# Patient Record
Sex: FEMALE | Race: WHITE | NOT HISPANIC OR LATINO | Employment: OTHER | URBAN - METROPOLITAN AREA
[De-identification: names, ages, dates, MRNs, and addresses within clinical notes are randomized per-mention and may not be internally consistent; named-entity substitution may affect disease eponyms.]

---

## 2017-02-02 ENCOUNTER — TELEPHONE (OUTPATIENT)
Dept: FAMILY MEDICINE | Facility: CLINIC | Age: 62
End: 2017-02-02

## 2017-02-02 NOTE — TELEPHONE ENCOUNTER
Panel Management Review      Patient has the following on her problem list:     Diabetes    ASA: Passed    Last A1C  A1C      8.6   9/26/2016  A1C      8.8   4/13/2016  A1C      8.5   10/19/2015  A1C      9.4   7/28/2015  A1C     10.2   4/9/2015  A1C tested: Failed    Last LDL:    CHOL      195   9/26/2016  HDL       47   9/26/2016  LDL      112   9/26/2016  LDL      101   11/9/2013  TRIG      182   9/26/2016  CHOLHDLRATIO      5.6   10/19/2015  NHDL      148   9/26/2016    Is the patient on a Statin? YES             Is the patient on Aspirin? YES    Medications     HMG CoA Reductase Inhibitors    pravastatin (PRAVACHOL) 40 MG tablet    Salicylates    ASPIRIN LOW DOSE 81 MG EC tablet          Last three blood pressure readings:  BP Readings from Last 3 Encounters:   12/16/16 126/82   09/30/16 125/88   04/18/16 130/60       Date of last diabetes office visit: 12/16/2016     Tobacco History:     History   Smoking status     Former Smoker     Quit date: 01/01/1984   Smokeless tobacco     Never Used     Comment: smoke free household.             Composite cancer screening  Chart review shows that this patient is due/due soon for the following None  Summary:    Patient is due/failing the following:   A1C    Action needed:   None    Type of outreach:    none    Questions for provider review:    None                                                                                                                                    Viki Syed M.A.       Chart routed to Viki Syed M.A.   .

## 2017-03-27 ENCOUNTER — DOCUMENTATION ONLY (OUTPATIENT)
Dept: LAB | Facility: CLINIC | Age: 62
End: 2017-03-27

## 2017-03-27 ENCOUNTER — RADIANT APPOINTMENT (OUTPATIENT)
Dept: GENERAL RADIOLOGY | Facility: CLINIC | Age: 62
End: 2017-03-27
Attending: INTERNAL MEDICINE
Payer: COMMERCIAL

## 2017-03-27 ENCOUNTER — OFFICE VISIT (OUTPATIENT)
Dept: RHEUMATOLOGY | Facility: CLINIC | Age: 62
End: 2017-03-27
Payer: COMMERCIAL

## 2017-03-27 VITALS
WEIGHT: 195 LBS | TEMPERATURE: 98.1 F | BODY MASS INDEX: 34.55 KG/M2 | SYSTOLIC BLOOD PRESSURE: 142 MMHG | OXYGEN SATURATION: 100 % | HEIGHT: 63 IN | DIASTOLIC BLOOD PRESSURE: 82 MMHG | HEART RATE: 80 BPM

## 2017-03-27 DIAGNOSIS — M06.9 RHEUMATOID ARTHRITIS INVOLVING MULTIPLE SITES, UNSPECIFIED RHEUMATOID FACTOR PRESENCE: ICD-10-CM

## 2017-03-27 DIAGNOSIS — E78.5 HYPERLIPIDEMIA LDL GOAL <70: Primary | ICD-10-CM

## 2017-03-27 DIAGNOSIS — M06.9 RHEUMATOID ARTHRITIS INVOLVING MULTIPLE SITES, UNSPECIFIED RHEUMATOID FACTOR PRESENCE: Primary | ICD-10-CM

## 2017-03-27 DIAGNOSIS — M54.2 NECK PAIN: ICD-10-CM

## 2017-03-27 DIAGNOSIS — L65.9 ALOPECIA: ICD-10-CM

## 2017-03-27 LAB
ALBUMIN SERPL-MCNC: 3.6 G/DL (ref 3.4–5)
ALP SERPL-CCNC: 68 U/L (ref 40–150)
ALT SERPL W P-5'-P-CCNC: 75 U/L (ref 0–50)
ANION GAP SERPL CALCULATED.3IONS-SCNC: 8 MMOL/L (ref 3–14)
AST SERPL W P-5'-P-CCNC: 33 U/L (ref 0–45)
BASOPHILS # BLD AUTO: 0 10E9/L (ref 0–0.2)
BASOPHILS NFR BLD AUTO: 0.5 %
BILIRUB SERPL-MCNC: 0.4 MG/DL (ref 0.2–1.3)
BUN SERPL-MCNC: 11 MG/DL (ref 7–30)
CALCIUM SERPL-MCNC: 9.2 MG/DL (ref 8.5–10.1)
CHLORIDE SERPL-SCNC: 101 MMOL/L (ref 94–109)
CO2 SERPL-SCNC: 27 MMOL/L (ref 20–32)
CREAT SERPL-MCNC: 0.63 MG/DL (ref 0.52–1.04)
CRP SERPL-MCNC: 10.7 MG/L (ref 0–8)
DIFFERENTIAL METHOD BLD: NORMAL
EOSINOPHIL # BLD AUTO: 0.2 10E9/L (ref 0–0.7)
EOSINOPHIL NFR BLD AUTO: 3.2 %
ERYTHROCYTE [DISTWIDTH] IN BLOOD BY AUTOMATED COUNT: 13.8 % (ref 10–15)
ERYTHROCYTE [SEDIMENTATION RATE] IN BLOOD BY WESTERGREN METHOD: 13 MM/H (ref 0–30)
GFR SERPL CREATININE-BSD FRML MDRD: ABNORMAL ML/MIN/1.7M2
GLUCOSE SERPL-MCNC: 208 MG/DL (ref 70–99)
HCT VFR BLD AUTO: 41.7 % (ref 35–47)
HGB BLD-MCNC: 13.8 G/DL (ref 11.7–15.7)
LYMPHOCYTES # BLD AUTO: 2.1 10E9/L (ref 0.8–5.3)
LYMPHOCYTES NFR BLD AUTO: 35.4 %
MCH RBC QN AUTO: 28.5 PG (ref 26.5–33)
MCHC RBC AUTO-ENTMCNC: 33.1 G/DL (ref 31.5–36.5)
MCV RBC AUTO: 86 FL (ref 78–100)
MONOCYTES # BLD AUTO: 0.3 10E9/L (ref 0–1.3)
MONOCYTES NFR BLD AUTO: 4.7 %
NEUTROPHILS # BLD AUTO: 3.4 10E9/L (ref 1.6–8.3)
NEUTROPHILS NFR BLD AUTO: 56.2 %
PLATELET # BLD AUTO: 196 10E9/L (ref 150–450)
POTASSIUM SERPL-SCNC: 4.3 MMOL/L (ref 3.4–5.3)
PROT SERPL-MCNC: 7.3 G/DL (ref 6.8–8.8)
RBC # BLD AUTO: 4.84 10E12/L (ref 3.8–5.2)
SODIUM SERPL-SCNC: 136 MMOL/L (ref 133–144)
WBC # BLD AUTO: 6 10E9/L (ref 4–11)

## 2017-03-27 PROCEDURE — 85652 RBC SED RATE AUTOMATED: CPT | Performed by: INTERNAL MEDICINE

## 2017-03-27 PROCEDURE — 86039 ANTINUCLEAR ANTIBODIES (ANA): CPT | Mod: 90 | Performed by: INTERNAL MEDICINE

## 2017-03-27 PROCEDURE — 87340 HEPATITIS B SURFACE AG IA: CPT | Performed by: INTERNAL MEDICINE

## 2017-03-27 PROCEDURE — 85025 COMPLETE CBC W/AUTO DIFF WBC: CPT | Performed by: INTERNAL MEDICINE

## 2017-03-27 PROCEDURE — 99204 OFFICE O/P NEW MOD 45 MIN: CPT | Performed by: INTERNAL MEDICINE

## 2017-03-27 PROCEDURE — 86200 CCP ANTIBODY: CPT | Performed by: INTERNAL MEDICINE

## 2017-03-27 PROCEDURE — 71020 XR CHEST 2 VW: CPT

## 2017-03-27 PROCEDURE — 72050 X-RAY EXAM NECK SPINE 4/5VWS: CPT

## 2017-03-27 PROCEDURE — 36415 COLL VENOUS BLD VENIPUNCTURE: CPT | Performed by: INTERNAL MEDICINE

## 2017-03-27 PROCEDURE — 86704 HEP B CORE ANTIBODY TOTAL: CPT | Performed by: INTERNAL MEDICINE

## 2017-03-27 PROCEDURE — 80053 COMPREHEN METABOLIC PANEL: CPT | Performed by: INTERNAL MEDICINE

## 2017-03-27 PROCEDURE — 86140 C-REACTIVE PROTEIN: CPT | Performed by: INTERNAL MEDICINE

## 2017-03-27 PROCEDURE — 86480 TB TEST CELL IMMUN MEASURE: CPT | Performed by: INTERNAL MEDICINE

## 2017-03-27 PROCEDURE — 86431 RHEUMATOID FACTOR QUANT: CPT | Performed by: INTERNAL MEDICINE

## 2017-03-27 PROCEDURE — 73630 X-RAY EXAM OF FOOT: CPT | Mod: 59

## 2017-03-27 PROCEDURE — 73130 X-RAY EXAM OF HAND: CPT | Mod: 59

## 2017-03-27 PROCEDURE — 99000 SPECIMEN HANDLING OFFICE-LAB: CPT | Performed by: INTERNAL MEDICINE

## 2017-03-27 NOTE — PROGRESS NOTES
This patient has a lab only appointment on 3/29/2017 but does not have future orders. Please review, associate diagnosis and sign pending lab orders for the upcoming appointment.  She has an appointment with Dr. Colvin on 4/10/2017.    Thank you,    [facility]
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
## 2017-03-27 NOTE — PROGRESS NOTES
Please review lab orders sign and close encounter. Kaylin Ashby MA/ROB    Diabetes, BP and cholesterol appt 4/10/17

## 2017-03-27 NOTE — Clinical Note
Dr. Colvin,  I plan to start Humira soon for her RA. Checking labs/x-rays first.   Thanks! Alejandro

## 2017-03-27 NOTE — NURSING NOTE
"Chief Complaint   Patient presents with     Consult     patient states pain all over       Initial /82 (BP Location: Left arm, Patient Position: Chair, Cuff Size: Adult Large)  Pulse 80  Temp 98.1  F (36.7  C) (Oral)  Ht 1.6 m (5' 3\")  Wt 88.5 kg (195 lb)  SpO2 100%  BMI 34.54 kg/m2 Estimated body mass index is 34.54 kg/(m^2) as calculated from the following:    Height as of this encounter: 1.6 m (5' 3\").    Weight as of this encounter: 88.5 kg (195 lb).  BP completed using cuff size: large         RAPID3 (0-30) Cumulative Score  21.3          RAPID3 Weighted Score (divide #4 by 3 and that is the weighted score)  7.1         "

## 2017-03-27 NOTE — PATIENT INSTRUCTIONS
Dr. Travis s Rheumatology Clinics  Locations Clinic Hours Telephone Number   [facility] [place]  [address]  [place] [address]     Wednesday: 7:20AM - 4:00PM  Thursday:     7:20AM - 4:00PM  Friday:          7:20AM - 11:00AM       To schedule an appointment with  Dr. Travis,  please contact  Specialty [phone]       [facility] [place]  [address]  [place] [address]       Monday:       7:20AM - 4:00PM      [facility]  [address]  [place] [address]       Tuesday:      7:20AM - 4:00PM          Thank you!    Crystal Vance CMA

## 2017-03-27 NOTE — MR AVS SNAPSHOT
After Visit Summary   3/27/2017    Kenna Colindres    MRN: [id number]           Patient Information     Date Of Birth          1955        Visit Information        Provider Department      3/27/2017 10:00 AM Alejandro Travis MD [facility] [place]        Today's Diagnoses     Rheumatoid arthritis involving multiple sites, unspecified rheumatoid factor presence (H)    -  1    Neck pain        Alopecia          Care Instructions      Dr. Travis s Rheumatology Clinics  Locations Clinic Hours Telephone Number   [facility]  [address]  [place] [address]     Wednesday: 7:20AM - 4:00PM  Thursday:     7:20AM - 4:00PM  Friday:          7:20AM - 11:00AM       To schedule an appointment with  Dr. Travis,  please contact  Specialty [phone]       [place]  [address]  [place] [address]       Monday:       7:20AM - 4:00PM      [facility]  [address] [place] [address]       Tuesday:      7:20AM - 4:00PM          Thank you!    Crystal Vance CMA            Follow-ups after your visit        Additional Services     DERMATOLOGY REFERRAL       Your provider has referred you to: [facility]: Dermatology Clinic [facility] [phone]   [email or web address]    Dr. Shell Plascencia    Please be aware that coverage of these services is subject to the terms and limitations of your health insurance plan.  Call member services at your health plan with any benefit or coverage questions.      Please bring the following with you to your appointment:    (1) Any X-Rays, CTs or MRIs which have been performed.  Contact the facility where they were done to arrange for  prior to your scheduled appointment.    (2) List of current medications  (3) This referral request   (4) Any documents/labs given to you for this referral                  Your next 10 appointments already scheduled     Mar 27, 2017 10:55  AM CDT   XR HAND BILATERAL G/E 3 VIEWS with BEXR1   [facility] [place] ([facility] [place])    [address]  [place] [address]   [phone]           Please bring a list of your current medicines to your exam. (Include vitamins, minerals and over-thecounter medicines.) Leave your valuables at home.  Tell your doctor if there is a chance you may be pregnant.  You do not need to do anything special for this exam.            Mar 27, 2017 11:00 AM CDT   XR FOOT BILATERAL G/E 3 VIEWS with BEXR1   [facility] [place] ([facility] [place])    [address]  [place] [address]   [phone]           Please bring a list of your current medicines to your exam. (Include vitamins, minerals and over-thecounter medicines.) Leave your valuables at home.  Tell your doctor if there is a chance you may be pregnant.  You do not need to do anything special for this exam.            Mar 27, 2017 11:05 AM CDT   XR CERVICAL SPINE G/E 4 VIEWS with BEXR1   [facility] [place] ([facility] [place])    [address]  [place] [address]   [phone]           Please bring a list of your current medicines to your exam. (Include vitamins, minerals and over-thecounter medicines.) Leave your valuables at home.  Tell your doctor if there is a chance you may be pregnant.  You do not need to do anything special for this exam.            Mar 27, 2017 11:10 AM CDT   XR CHEST 2 VIEWS with BEXR1   [facility] [place] ([facility] [place])    [address]  [place] [address]   [phone]           Please bring a list of your current medicines to your exam. (Include vitamins, minerals and over-thecounter medicines.) Leave your valuables at home.  Tell your doctor if there is a chance you may be pregnant.  You do not need to do anything special for this exam.            Mar 29, 2017  8:45 AM CDT   LAB with AN LAB   [facility]  [facility])    [address] [address]   [phone]           Patient must bring picture ID.  Patient should be prepared to give a urine specimen  Please do not eat 10-12 hours before your appointment if you are coming in fasting for labs on lipids, cholesterol, or glucose (sugar).  Pregnant women should follow their Care Team instructions. Water with medications is okay. Do not drink coffee or other fluids.   If you have concerns about taking  your medications, please ask at office or if scheduling via [employer], send a message by clicking on Secure Messaging, Message Your Care Team.            Apr 10, 2017  7:30 AM CDT   Office Visit with Beinvenido Colvin MD   [facility]    [address] [address] [phone]           Bring a current list of meds and any records pertaining to this visit.  For Physicals, please bring immunization records and any forms needing to be filled out.  Please arrive 10 minutes early to complete paperwork.            Jun 19, 2017  7:15 AM CDT   LAB with AN LAB   [facility] ([facility])    [address] [address]   [phone]           Patient must bring picture ID.  Patient should be prepared to give a urine specimen  Please do not eat 10-12 hours before your appointment if you are coming in fasting for labs on lipids, cholesterol, or glucose (sugar).  Pregnant women should follow their Care Team instructions. Water with medications is okay. Do not drink coffee or other fluids.   If you have concerns about taking  your medications, please ask at office or if scheduling via [employer], send a message by clicking on Secure Messaging, Message Your Care Team.            Jun 26, 2017  7:20 AM CDT   Return Visit with Alejandro Travis MD   [facility] [place] ([facility])    [address]  [place] [address] [phone]             "  Future tests that were ordered for you today     Open Future Orders        Priority Expected Expires Ordered    CBC with platelets differential Routine 6/21/2017 7/10/2017 3/27/2017    Creatinine Routine 6/21/2017 7/10/2017 3/27/2017    Erythrocyte sedimentation rate auto Routine 6/21/2017 7/10/2017 3/27/2017    CRP inflammation Routine 6/21/2017 7/10/2017 3/27/2017    Hepatic panel Routine 6/21/2017 7/10/2017 3/27/2017            Who to contact     If you have questions or need follow up information about today's clinic visit or your schedule please contact [facility] directly at [phone].  Normal or non-critical lab and imaging results will be communicated to you by Ecomsualhart, letter or phone within 4 business days after the clinic has received the results. If you do not hear from us within 7 days, please contact the clinic through [employer] or phone. If you have a critical or abnormal lab result, we will notify you by phone as soon as possible.  Submit refill requests through [employer] or call your pharmacy and they will forward the refill request to us. Please allow 3 business days for your refill to be completed.          Additional Information About Your Visit        [employer] Information     [employer] gives you secure access to your electronic health record. If you see a primary care provider, you can also send messages to your care team and make appointments. If you have questions, please call your primary care clinic.  If you do not have a primary care provider, please call [phone] and they will assist you.        Care EveryWhere ID     This is your Care EveryWhere ID. This could be used by other organizations to access your [facility] medical records  [id number]        Your Vitals Were     Pulse Temperature Height Pulse Oximetry BMI (Body Mass Index)       80 98.1  F (36.7  C) (Oral) 1.6 m (5' 3\") 100% 34.54 kg/m2        Blood Pressure from Last 3 Encounters:   03/27/17 142/82   12/16/16 " 126/82   09/30/16 125/88    Weight from Last 3 Encounters:   03/27/17 88.5 kg (195 lb)   12/16/16 90.7 kg (200 lb)   09/30/16 88 kg (194 lb)              We Performed the Following     CBC with platelets differential     Comprehensive metabolic panel     CRP inflammation     Cyclic Citrullinated Peptide Antibody IgG     DERMATOLOGY REFERRAL     Erythrocyte sedimentation rate auto     Hepatitis B core antibody     Hepatitis B surface antigen     M Tuberculosis by Quantiferon     Nuclear Antibody LUCY by IFA IgG     Rheumatoid factor        Primary Care Provider Office Phone # Fax #    Bienvenido Colvin [phone]       [facility] [address] [address]        Thank you!     Thank you for choosing [facility]  for your care. Our goal is always to provide you with excellent care. Hearing back from our patients is one way we can continue to improve our services. Please take a few minutes to complete the written survey that you may receive in the mail after your visit with us. Thank you!             Your Updated Medication List - Protect others around you: Learn how to safely use, store and throw away your medicines at www.disposemymeds.org.          This list is accurate as of: 3/27/17 10:54 AM.  Always use your most recent med list.                   Brand Name Dispense Instructions for use    albuterol 108 (90 BASE) MCG/ACT Inhaler    PROAIR HFA/PROVENTIL HFA/VENTOLIN HFA    3 Inhaler    Inhale 2 puffs into the lungs every 6 hours as needed for shortness of breath / dyspnea or wheezing       ASPIRIN LOW DOSE 81 MG EC tablet   Generic drug:  aspirin     90 tablet    TAKE 1 TABLET DAILY       blood glucose monitoring meter device kit     1 kit    by In Vitro route daily Brand per insurance or patient choice. Please include lancets, strips and other supplies for one year. Check glucose levels once per day.       dulaglutide 0.75 MG/0.5ML pen    TRULICITY    12 mL     Inject 0.75 mg Subcutaneous every 7 days       ENBREL 50 MG/ML injection   Generic drug:  etanercept      Inject 50 mg Subcutaneous once a week Reported on 3/27/2017       fluticasone 50 MCG/ACT spray    FLONASE     Spray 2 sprays into both nostrils daily       ibuprofen 600 MG tablet    ADVIL/MOTRIN    30 tablet    Take 1 tablet (600 mg) by mouth every 6 hours as needed for pain       levothyroxine 150 MCG tablet    LEVOTHROID    90 tablet    Take 1 tablet (150 mcg) by mouth daily       lisinopril 5 MG tablet    PRINIVIL/ZESTRIL    90 tablet    Take 1 tablet (5 mg) by mouth daily       metFORMIN 500 MG 24 hr tablet    GLUCOPHAGE-XR    360 tablet    Take 2 tablets (1,000 mg) by mouth 2 times daily (with meals)       MULTIVITAMINS PO      Take by mouth daily       omeprazole 20 MG tablet    priLOSEC OTC     Take 20 mg by mouth daily       pravastatin 40 MG tablet    PRAVACHOL    90 tablet    Take 1 tablet (40 mg) by mouth daily       PROBIOTIC DAILY PO      Reported on 3/27/2017       sitagliptin 100 MG tablet    JANUVIA    90 tablet    Take 1 tablet (100 mg) by mouth daily       traMADol 50 MG tablet    ULTRAM    60 tablet    Take 1 tablet by mouth every 8 hours as needed for pain.       traZODone 100 MG tablet    DESYREL    135 tablet    Take 1.5 tablets (150 mg) by mouth nightly as needed for sleep       UNABLE TO FIND      Reported on 3/27/2017       * UNKNOWN TO PATIENT      Medication from hair restoration facility       * UNKNOWN TO PATIENT      Hair loss medication for women       VISINE MAXIMUM REDNESS RELIEF 0.05-0.2-0.36-1 % Soln   Generic drug:  Tetrahydroz-Glyc-Hyprom-PEG      Reported on 3/27/2017       * Notice:  This list has 2 medication(s) that are the same as other medications prescribed for you. Read the directions carefully, and ask your doctor or other care provider to review them with you.

## 2017-03-28 LAB
HBV CORE AB SERPL QL IA: NONREACTIVE
HBV SURFACE AG SERPL QL IA: NONREACTIVE
M TB TUBERC IFN-G BLD QL: NEGATIVE
M TB TUBERC IFN-G/MITOGEN IGNF BLD: 0 IU/ML
RHEUMATOID FACT SER NEPH-ACNC: 156 IU/ML (ref 0–20)

## 2017-03-29 DIAGNOSIS — E78.5 HYPERLIPIDEMIA LDL GOAL <70: ICD-10-CM

## 2017-03-29 LAB
CCP AB SER IA-ACNC: >340 U/ML
CHOLEST SERPL-MCNC: 234 MG/DL
HBA1C MFR BLD: 9.8 % (ref 4.3–6)
HDLC SERPL-MCNC: 48 MG/DL
LDLC SERPL CALC-MCNC: 150 MG/DL
NONHDLC SERPL-MCNC: 186 MG/DL
NUCLEAR IGG TITR SER IF: ABNORMAL {TITER}
TRIGL SERPL-MCNC: 178 MG/DL

## 2017-03-29 PROCEDURE — 83036 HEMOGLOBIN GLYCOSYLATED A1C: CPT | Performed by: FAMILY MEDICINE

## 2017-03-29 PROCEDURE — 80061 LIPID PANEL: CPT | Performed by: FAMILY MEDICINE

## 2017-03-29 PROCEDURE — 36415 COLL VENOUS BLD VENIPUNCTURE: CPT | Performed by: FAMILY MEDICINE

## 2017-04-01 DIAGNOSIS — L65.9 ALOPECIA: ICD-10-CM

## 2017-04-01 DIAGNOSIS — R76.8 ANA POSITIVE: ICD-10-CM

## 2017-04-01 DIAGNOSIS — M05.79 RHEUMATOID ARTHRITIS INVOLVING MULTIPLE SITES WITH POSITIVE RHEUMATOID FACTOR (H): Primary | ICD-10-CM

## 2017-04-02 NOTE — PROGRESS NOTES
"Rheumatology team:   1. Please call to notify Ms. Colindres of the information below.  She said that she is new to [facility] and would still appreciate a call.  2. Please prior auth Humira    MyChart message sent:  \"Ms. Colindres,    Your antibodies for rheumatoid arthritis were both positive (CCP and RF).  X-rays showed degenerative changes.  I have prescribed Humira; please let me know if it has not been received within the next 10 days.    The antinuclear antibody level is low, but in the setting of the hair thinning I have ordered additional labs to be done to assess for possibly an associated connective tissue disease.  Please have these labs done at any [place] lab.    ALT, a liver enzyme, was elevated and could be due to the ibuprofen.  Please only use ibuprofen as needed.     Sincerely,  Alejandro Travis MD  4/1/2017 9:33 PM\""

## 2017-04-03 ENCOUNTER — TELEPHONE (OUTPATIENT)
Dept: RHEUMATOLOGY | Facility: CLINIC | Age: 62
End: 2017-04-03

## 2017-04-03 NOTE — TELEPHONE ENCOUNTER
Prior authorization for the Humira pen has been approved from 3/4/17 to 7/2/17. Crystal Vance CMA

## 2017-04-03 NOTE — TELEPHONE ENCOUNTER
Per representative at [facility] Prior auth approved from 3/4/17 - 7/2/17.    Wil Stein CMA

## 2017-04-20 ENCOUNTER — TELEPHONE (OUTPATIENT)
Dept: RHEUMATOLOGY | Facility: CLINIC | Age: 62
End: 2017-04-20

## 2017-04-20 DIAGNOSIS — M05.79 RHEUMATOID ARTHRITIS INVOLVING MULTIPLE SITES WITH POSITIVE RHEUMATOID FACTOR (H): ICD-10-CM

## 2017-04-20 NOTE — TELEPHONE ENCOUNTER
Reason for Call:  Other     Detailed comments: Per Anthony patient is experiencing a malfunction with her HUMIRA PEN and at no cost to patient the pharmacy would like to replace it; if approved by provider.    Phone Number Patient can be reached at: Other phone number:  [phone]    Best Time: anytime    Can we leave a detailed message on this number? YES    Call taken on 4/20/2017 at 12:45 PM by Carey Lopez

## 2017-04-21 ENCOUNTER — TELEPHONE (OUTPATIENT)
Dept: RHEUMATOLOGY | Facility: CLINIC | Age: 62
End: 2017-04-21

## 2017-04-21 NOTE — TELEPHONE ENCOUNTER
Reason for Call:  Other prescription    Detailed comments: [facility] calling to get a verbal ok to replace one humira flex pen for the patient. Please call with ok.     Phone Number Patient can be reached at: Other phone number:  Number above    Best Time:  any    Can we leave a detailed message on this number? YES    Call taken on 4/21/2017 at 12:30 PM by Lauren Simth

## 2017-05-09 ENCOUNTER — TELEPHONE (OUTPATIENT)
Dept: FAMILY MEDICINE | Facility: CLINIC | Age: 62
End: 2017-05-09

## 2017-05-09 NOTE — LETTER
[facility]     [address] [place]  [address]    Phone:  [phone]          Kenna Colindres                                                                [address] [address]              May 9, 2017             Our records indicate that you have not scheduled for a(n)appointment with  Bienvenido Colvin MD and annual female exam which was recommended by your health care team. Monitoring and managing your preventative and chronic health conditions are very important to us.     If you are receiving any of these services at another site please bring in a copy at your next visit so we can get it scanned into your chart.       Please call [phone] or message us through your [employer] account to schedule an appointment or provide information for your chart.     I look forward to seeing you and working with you on your health care needs.     Sincerely,       Bienvenido Colvin MD/              *If you have already scheduled an appointment, please disregard this reminder

## 2017-05-09 NOTE — TELEPHONE ENCOUNTER
Panel Management Review      Patient has the following on her problem list:     Diabetes    ASA: Passed    Last A1C  Lab Results   Component Value Date    A1C 9.8 03/29/2017    A1C 8.6 09/26/2016    A1C 8.8 04/13/2016    A1C 8.5 10/19/2015    A1C 9.4 07/28/2015     A1C tested: FAILED    Last LDL:    Lab Results   Component Value Date    CHOL 234 03/29/2017     Lab Results   Component Value Date    HDL 48 03/29/2017     Lab Results   Component Value Date     03/29/2017     Lab Results   Component Value Date    TRIG 178 03/29/2017     Lab Results   Component Value Date    CHOLHDLRATIO 5.6 10/19/2015     Lab Results   Component Value Date    NHDL 186 03/29/2017       Is the patient on a Statin? YES             Is the patient on Aspirin? YES    Medications     HMG CoA Reductase Inhibitors    pravastatin (PRAVACHOL) 40 MG tablet    Salicylates    ASPIRIN LOW DOSE 81 MG EC tablet          Last three blood pressure readings:  BP Readings from Last 3 Encounters:   03/27/17 142/82   12/16/16 126/82   09/30/16 125/88       Date of last diabetes office visit: 12-     Tobacco History:     History   Smoking Status     Former Smoker     Quit date: 1/1/1984   Smokeless Tobacco     Never Used     Comment: smoke free household.         Hypertension   Last three blood pressure readings:  BP Readings from Last 3 Encounters:   03/27/17 142/82   12/16/16 126/82   09/30/16 125/88     Blood pressure: FAILED    HTN Guidelines:  Age 18-59 BP range:  Less than 140/90  Age 60-85 with Diabetes:  Less than 140/90  Age 60-85 without Diabetes:  less than 150/90      Composite cancer screening  Chart review shows that this patient is due/due soon for the following Pap Smear  Summary:    Patient is due/failing the following:   A1C, BP CHECK and PAP    Action needed:   Patient needs office visit for an annual exam.    Type of outreach:    Sent letter.    Questions for provider review:    None                                                                                                                                     Veronika Rollins LPN       Chart routed to Care Team .

## 2017-05-15 ENCOUNTER — OFFICE VISIT (OUTPATIENT)
Dept: FAMILY MEDICINE | Facility: CLINIC | Age: 62
End: 2017-05-15
Payer: COMMERCIAL

## 2017-05-15 VITALS
BODY MASS INDEX: 33.48 KG/M2 | OXYGEN SATURATION: 99 % | HEART RATE: 90 BPM | WEIGHT: 189 LBS | SYSTOLIC BLOOD PRESSURE: 138 MMHG | DIASTOLIC BLOOD PRESSURE: 80 MMHG | TEMPERATURE: 97 F

## 2017-05-15 DIAGNOSIS — E78.5 HYPERLIPIDEMIA LDL GOAL <70: ICD-10-CM

## 2017-05-15 DIAGNOSIS — I10 ESSENTIAL HYPERTENSION WITH GOAL BLOOD PRESSURE LESS THAN 140/90: ICD-10-CM

## 2017-05-15 DIAGNOSIS — G25.81 RESTLESS LEGS SYNDROME: ICD-10-CM

## 2017-05-15 PROCEDURE — 99214 OFFICE O/P EST MOD 30 MIN: CPT | Performed by: FAMILY MEDICINE

## 2017-05-15 RX ORDER — PRAVASTATIN SODIUM 40 MG
40 TABLET ORAL DAILY
Qty: 90 TABLET | Refills: 3 | Status: SHIPPED | OUTPATIENT
Start: 2017-05-15 | End: 2018-04-09

## 2017-05-15 RX ORDER — BLOOD-GLUCOSE METER
EACH MISCELLANEOUS DAILY
Qty: 1 KIT | Refills: 0 | Status: SHIPPED | OUTPATIENT
Start: 2017-05-15 | End: 2017-09-27

## 2017-05-15 RX ORDER — PRAMIPEXOLE DIHYDROCHLORIDE 0.5 MG/1
0.5 TABLET ORAL AT BEDTIME
Qty: 90 TABLET | Refills: 1 | Status: SHIPPED | OUTPATIENT
Start: 2017-05-15 | End: 2018-06-27

## 2017-05-15 RX ORDER — LISINOPRIL 10 MG/1
10 TABLET ORAL DAILY
Qty: 90 TABLET | Refills: 1 | Status: SHIPPED | OUTPATIENT
Start: 2017-05-15 | End: 2018-03-15

## 2017-05-15 ASSESSMENT — ANXIETY QUESTIONNAIRES
GAD7 TOTAL SCORE: 7
2. NOT BEING ABLE TO STOP OR CONTROL WORRYING: SEVERAL DAYS
IF YOU CHECKED OFF ANY PROBLEMS ON THIS QUESTIONNAIRE, HOW DIFFICULT HAVE THESE PROBLEMS MADE IT FOR YOU TO DO YOUR WORK, TAKE CARE OF THINGS AT HOME, OR GET ALONG WITH OTHER PEOPLE: SOMEWHAT DIFFICULT
3. WORRYING TOO MUCH ABOUT DIFFERENT THINGS: SEVERAL DAYS
5. BEING SO RESTLESS THAT IT IS HARD TO SIT STILL: SEVERAL DAYS
6. BECOMING EASILY ANNOYED OR IRRITABLE: SEVERAL DAYS
7. FEELING AFRAID AS IF SOMETHING AWFUL MIGHT HAPPEN: SEVERAL DAYS
1. FEELING NERVOUS, ANXIOUS, OR ON EDGE: SEVERAL DAYS

## 2017-05-15 ASSESSMENT — PATIENT HEALTH QUESTIONNAIRE - PHQ9: 5. POOR APPETITE OR OVEREATING: SEVERAL DAYS

## 2017-05-15 NOTE — PATIENT INSTRUCTIONS
1. Increase your Lisinopril to 10 mg daily.    2. You said you will get the Januvia from [place]. Keep taking that.    3. Keep taking your other medications.    4. Take Jardiance 25 mg daily. This medication works by removing glucose via the urine. It can also help with weight loss. Genital infections like yeast and bladder infections may be increased. Dehydration is also a concern. There are concerns about increased LDL, bladder and breast tumors but these risks are quite low. There is a risk of ketoacidosis - if you have any nausea, vomiting, high heart rate stop the medicine and let me know.    5. Try Mirapex about 1 hour before bed time.    6. I will contact you by phone in about 2 weeks to see how things are going.

## 2017-05-15 NOTE — PROGRESS NOTES
"HPI:    Kenna Colindres is a 62 year old female here to discuss a multitude of issues:    Please note, compliance continues to be an issue.    Previous chest pain - She says this has not recurred. From previous notes \"she gives the history kind of as a \"by the way.\" She also minimizes the situation and is vague with the history. The best I can put together, she has had 2 episodes in the last few weeks of left sided chest pain, going in to the left shoulder, accompanied by shortness of breath. No nausea or diaphoresis. These episodes occurred while walking and stopped at rest. They have not occurred before or since. Although her description is vague, I think this is concerning for ACS. I chided her for not going to ED or calling 911 and gave her strict instructions for the future. For now I explained she needs an EKG, stress test and/or cardiology consult. She says the symptoms have gone away and the only reason she informed me was to \"just let you know.\" She declined any further instructions despite my insistence. In the end she was getting a bit irritated with me so I told her she has a right to refuse. She said she will go to ED if this happens again.\"    DM2, please note she is ok with the phrase \"high sugars\" but does not want the label diabetes (without retinopathy, without nephropathy, without nephropathy) -    Dx'd around before 2012 (steroid induced?)  Checks glucose levels: not checking but was willing to accept rx for glucose monitor and supplies today  Symptoms of low glucose: denies  Last A1c:     Lab Results   Component Value Date    A1C 9.8 03/29/2017    A1C 8.6 09/26/2016    A1C 8.8 04/13/2016    A1C 8.5 10/19/2015    A1C 9.4 07/28/2015       Health maintenance like eye exam, foot exam etc: reviewed and updated as needed     Advised to set up another eye exam and have them send us records.     Treatment:    Metformin 1000 mg bid   Januvia 100 mg qd   Trulicity 0.75 mg SQ weekly - never tried " this.   Does not want to talk about insulin or any other injections   Was willing to try Jardiance 25 mg qd - discussed this is a high risk med, regarding ketoacidosis etc. She verbalized understanding.  Compliant: yes  Controlled: no    Counseling: about insulin resistance, in relation to excessive weight, diet and exercise. Also about checking glucose levels regularly and adherence to prescribed medications, symptoms of hypoglycemia and how to correct this by taking sugar pills, juice, regular pop or milk. Discussed about proper foot care including wearing well fitting, well padded shoes.    HTN - fairly controlled in clinic Does not check at home.  Treatment:   Lisinopril 5 mg qd. No side effects.    Last Basic Metabolic Panel:  Lab Results   Component Value Date     03/27/2017      Lab Results   Component Value Date    POTASSIUM 4.3 03/27/2017     Lab Results   Component Value Date    CHLORIDE 101 03/27/2017     Lab Results   Component Value Date    ANANTH 9.2 03/27/2017     Lab Results   Component Value Date    CO2 27 03/27/2017     Lab Results   Component Value Date    BUN 11 03/27/2017     Lab Results   Component Value Date    CR 0.63 03/27/2017     Lab Results   Component Value Date     03/27/2017         CBC RESULTS:   Recent Labs   Lab Test  04/13/16   1001   WBC  6.2   RBC  4.96   HGB  14.4   HCT  43.2   MCV  87   MCH  29.0   MCHC  33.3   RDW  13.2   PLT  189     Dyslipidemia - no h/o vascular disease like CAD, PAD, CVA. But has h/o diabetes.   Treatment:   Pravachol 40 mg qd (moderate intensity per ATP4)   Side effects: no   Compliant: yes  Counseling: regarding healthy weight, diet and exercise.     Recent Labs   Lab Test  03/29/17   0850  09/26/16   0859   10/19/15   0801  07/05/14   0933   CHOL  234*  195   < >  231*  189   HDL  48*  47*   < >  41*  41*   LDL  150*  112*   < >  158*  126   TRIG  178*  182*   < >  161*  106   CHOLHDLRATIO   --    --    --   5.6*  4.6    < > = values in this  interval not displayed.       Lab Results   Component Value Date    AST 33 03/27/2017     Lab Results   Component Value Date    ALT 75 03/27/2017     No results found for: BILICONJ   Lab Results   Component Value Date    BILITOTAL 0.4 03/27/2017     Lab Results   Component Value Date    ALBUMIN 3.6 03/27/2017     Lab Results   Component Value Date    PROTTOTAL 7.3 03/27/2017      Lab Results   Component Value Date    ALKPHOS 68 03/27/2017     Hypothyroid - stable on Synthroid 150 mg qd.    TSH   Date Value Ref Range Status   09/26/2016 3.38 0.40 - 4.00 mU/L Final     Obesity/sleep apnea - uses CPAP. Has had lap band around 2004. I commended her for losing weight.    Wt Readings from Last 5 Encounters:   05/15/17 189 lb (85.7 kg)   03/27/17 195 lb (88.5 kg)   12/16/16 200 lb (90.7 kg)   09/30/16 194 lb (88 kg)   04/18/16 193 lb 8 oz (87.8 kg)       Bilateral shoulder pain - her rheumatologist wanted her to discuss injection with me. I previously referred her to ortho but she did not go.     RA/fibromyalgia - follows with rheumatology.    Insomnia and restless legs - Sleeps about 4 hours per night. Has hard time falling asleep and staying asleep. Also restless legs at night. Walking around helps.   Treatment:   Aspirin and milk help   Trazodone 150 mg prn somewhat helped.   Melatonin no help   Will try Mirapex.    Allergies - symptoms are red eyes, itchy eyes and tearing. She tried OTC benadryl and eye drops which help. Mild nasal drainage. No sneezing or coughing. She denies pets at home.    Preventive:    Flu shot: declines  Pneumovax: declines  Prevnar: declines  Shingles: declines    Immunization History   Administered Date(s) Administered     Influenza (IIV3) 11/12/2009     TDAP Vaccine (Adacel) 01/01/2008       Mammogram: negative 4/30/15 - ordered another     PAP: declined repeatedly    Colon CA screen: Colonoscopy 8/14/09 per patient - negative.    Hep C screen: negative 8/14/13    Advanced Directive: over age  55 every 5 years. At younger age for chronic diseases    Vit D Geriatrics Society Guidelines: Older adults should consume 4000 IU of Vit D daily from all sources. This will achieve serum level of 30. No need to test unless obese, malabsorption etc. You get only 100 units per glass of milk.    ROS:    Const: No fevers, weight changes or night sweats recently.  ENT: No sore throat or ear pain.  Resp: No cough or shortness of breath.  CV: No dizziness or cardiac palpitations.  GI: No nausea, vomiting, diarrhea or constipation. Denies blood in stools or black stools.  : No dysuria, frequency or hematuria.    SH:    Marital status:   Kids: 2  Employment: works at [employer], physical job  Exercise: active in general.  Tobacco: no  Etoh: rarely  Recreational drugs: no  Caffeine:     Exam:    /80  Pulse 90  Temp 97  F (36.1  C) (Oral)  Wt 189 lb (85.7 kg)  SpO2 99%  BMI 33.48 kg/m2    Gen: Healthy appearing female in no acute distress  ENT: TM's normal. Oropharynx normal. Oral mucosa moist without lesions.  Eyes: Conjunctiva and sclera normal. Pupils react normally to light. No nystagmus.  Neck: No enlarged lymph nodes, thyromegally or other masses.  Lungs: Good air movement and otherwise clear.  CV: Heart RRR with no murmurs. No JVD, carotid bruits or leg edema.    Assessment and Plan - Decision Making    1. Uncontrolled type 2 diabetes mellitus without complication, without long-term current use of insulin (H)  Per HPI  - empagliflozin (JARDIANCE) 25 MG TABS tablet; Take 1 tablet (25 mg) by mouth daily  Dispense: 90 tablet; Refill: 1  - blood glucose monitoring (ONE TOUCH ULTRA 2) meter device kit; by In Vitro route daily Brand per insurance. Include lancets, strips, etc x one year. Check glucose levels once per day.  Dispense: 1 kit; Refill: 0    2. Essential hypertension with goal blood pressure less than 140/90  Fair control. But could be better. Will increase Lisinopril to 10 mg qd.  -  lisinopril (PRINIVIL/ZESTRIL) 10 MG tablet; Take 1 tablet (10 mg) by mouth daily  Dispense: 90 tablet; Refill: 1    3. Hyperlipidemia LDL goal <70  Continue same treatment.  - pravastatin (PRAVACHOL) 40 MG tablet; Take 1 tablet (40 mg) by mouth daily  Dispense: 90 tablet; Refill: 3    4. Restless legs syndrome    - pramipexole (MIRAPEX) 0.5 MG tablet; Take 1 tablet (0.5 mg) by mouth At Bedtime  Dispense: 90 tablet; Refill: 1      Written instructions given as follows:    Patient Instructions   1. Increase your Lisinopril to 10 mg daily.    2. You said you will get the Januvia from [place]. Keep taking that.    3. Keep taking your other medications.    4. Take Jardiance 25 mg daily. This medication works by removing glucose via the urine. It can also help with weight loss. Genital infections like yeast and bladder infections may be increased. Dehydration is also a concern. There are concerns about increased LDL, bladder and breast tumors but these risks are quite low. There is a risk of ketoacidosis - if you have any nausea, vomiting, high heart rate stop the medicine and let me know.    5. Try Mirapex about 1 hour before bed time.    6. I will contact you by phone in about 2 weeks to see how things are going.

## 2017-05-15 NOTE — MR AVS SNAPSHOT
After Visit Summary   5/15/2017    Kenna Colindres    MRN: [id number]           Patient Information     Date Of Birth          1955        Visit Information        Provider Department      5/15/2017 1:30 PM Bienvenido Colvin MD [facility]        Today's Diagnoses     Uncontrolled type 2 diabetes mellitus without complication, without long-term current use of insulin (H)    -  1    Essential hypertension with goal blood pressure less than 140/90        Hyperlipidemia LDL goal <70        Restless legs syndrome          Care Instructions    1. Increase your Lisinopril to 10 mg daily.    2. You said you will get the Januvia from [place]. Keep taking that.    3. Keep taking your other medications.    4. Take Jardiance 25 mg daily. This medication works by removing glucose via the urine. It can also help with weight loss. Genital infections like yeast and bladder infections may be increased. Dehydration is also a concern. There are concerns about increased LDL, bladder and breast tumors but these risks are quite low. There is a risk of ketoacidosis - if you have any nausea, vomiting, high heart rate stop the medicine and let me know.    5. Try Mirapex about 1 hour before bed time.    6. I will contact you by phone in about 2 weeks to see how things are going.             Follow-ups after your visit        Your next 10 appointments already scheduled     Jun 19, 2017  7:15 AM CDT   LAB with AN LAB   [facility]    [address] [address] [phone]           Patient must bring picture ID.  Patient should be prepared to give a urine specimen  Please do not eat 10-12 hours before your appointment if you are coming in fasting for labs on lipids, cholesterol, or glucose (sugar).  Pregnant women should follow their Care Team instructions. Water with medications is okay. Do not drink coffee or other fluids.   If you have concerns about  taking  your medications, please ask at office or if scheduling via lifecake, send a message by clicking on Secure Messaging, Message Your Care Team.            Jun 26, 2017  7:20 AM CDT   Return Visit with Alejandro Travis MD   [facility] ([facility] [place])    [address]  [place] [address] [phone]              Who to contact     If you have questions or need follow up information about today's clinic visit or your schedule please contact [facility] directly at [phone].  Normal or non-critical lab and imaging results will be communicated to you by MyChart, letter or phone within 4 business days after the clinic has received the results. If you do not hear from us within 7 days, please contact the clinic through [employer] or phone. If you have a critical or abnormal lab result, we will notify you by phone as soon as possible.  Submit refill requests through lifecake or call your pharmacy and they will forward the refill request to us. Please allow 3 business days for your refill to be completed.          Additional Information About Your Visit        [employer] Information     lifecake gives you secure access to your electronic health record. If you see a primary care provider, you can also send messages to your care team and make appointments. If you have questions, please call your primary care clinic.  If you do not have a primary care provider, please call [phone] and they will assist you.        Care EveryWhere ID     This is your Care EveryWhere ID. This could be used by other organizations to access your [facility] medical records  [id number]        Your Vitals Were     Pulse Temperature Pulse Oximetry BMI (Body Mass Index)          90 97  F (36.1  C) (Oral) 99% 33.48 kg/m2         Blood Pressure from Last 3 Encounters:   05/15/17 138/80   03/27/17 142/82   12/16/16 126/82    Weight from Last 3 Encounters:   05/15/17 189 lb (85.7 kg)   03/27/17 195 lb  (88.5 kg)   12/16/16 200 lb (90.7 kg)              Today, you had the following     No orders found for display         Today's Medication Changes          These changes are accurate as of: 5/15/17  2:15 PM.  If you have any questions, ask your nurse or doctor.               Start taking these medicines.        Dose/Directions    empagliflozin 25 MG Tabs tablet   Commonly known as:  JARDIANCE   Used for:  Uncontrolled type 2 diabetes mellitus without complication, without long-term current use of insulin (H)   Started by:  Bienvenido Colvin MD        Dose:  25 mg   Take 1 tablet (25 mg) by mouth daily   Quantity:  90 tablet   Refills:  1       pramipexole 0.5 MG tablet   Commonly known as:  MIRAPEX   Used for:  Restless legs syndrome   Started by:  Bienvenido Colvin MD        Dose:  0.5 mg   Take 1 tablet (0.5 mg) by mouth At Bedtime   Quantity:  90 tablet   Refills:  1         These medicines have changed or have updated prescriptions.        Dose/Directions    * blood glucose monitoring meter device kit   This may have changed:  Another medication with the same name was added. Make sure you understand how and when to take each.   Used for:  Uncontrolled type 2 diabetes mellitus without complication, without long-term current use of insulin (H)   Changed by:  Bienvenido Colvin MD        by In Vitro route daily Brand per insurance or patient choice. Please include lancets, strips and other supplies for one year. Check glucose levels once per day.   Quantity:  1 kit   Refills:  0       * blood glucose monitoring meter device kit   This may have changed:  You were already taking a medication with the same name, and this prescription was added. Make sure you understand how and when to take each.   Used for:  Uncontrolled type 2 diabetes mellitus without complication, without long-term current use of insulin (H)   Changed by:  Bienvenido Colvin MD        by In Vitro route daily Brand per insurance. Include lancets, strips,  etc x one year. Check glucose levels once per day.   Quantity:  1 kit   Refills:  0       lisinopril 10 MG tablet   Commonly known as:  PRINIVIL/ZESTRIL   This may have changed:    - medication strength  - how much to take   Used for:  Essential hypertension with goal blood pressure less than 140/90   Changed by:  Bienvenido Colvin MD        Dose:  10 mg   Take 1 tablet (10 mg) by mouth daily   Quantity:  90 tablet   Refills:  1       * Notice:  This list has 2 medication(s) that are the same as other medications prescribed for you. Read the directions carefully, and ask your doctor or other care provider to review them with you.         Where to get your medicines      These medications were sent to [facility] - [address] [address]     Phone:  [phone]     lisinopril 10 MG tablet    pramipexole 0.5 MG tablet    pravastatin 40 MG tablet         These medications were sent to [address], [address] [address]     Phone:  [phone]     blood glucose monitoring meter device kit         Call your pharmacy to confirm that your medication is ready for pickup. It may take up to 24 hours for them to receive the prescription. If the prescription is not ready within 3 business days, please contact your clinic or your provider.     We will let you know when these medications are ready. If you don't hear back within 3 business days, please contact us.     empagliflozin 25 MG Tabs tablet                Primary Care Provider Office Phone # Fax #    Bienvenido Colvin [phone]       [address] [address]        Thank you!     Thank you for choosing [facility]  for your care. Our goal is always to provide you with excellent care. Hearing back from our patients is one way we can continue to  improve our services. Please take a few minutes to complete the written survey that you may receive in the mail after your visit with us. Thank you!             Your Updated Medication List - Protect others around you: Learn how to safely use, store and throw away your medicines at www.disposemymeds.org.          This list is accurate as of: 5/15/17  2:15 PM.  Always use your most recent med list.                   Brand Name Dispense Instructions for use    adalimumab 40 MG/0.8ML pen kit    HUMIRA PEN    2 each    Inject 0.8 mLs (40 mg) Subcutaneous every 14 days       albuterol 108 (90 BASE) MCG/ACT Inhaler    PROAIR HFA/PROVENTIL HFA/VENTOLIN HFA    3 Inhaler    Inhale 2 puffs into the lungs every 6 hours as needed for shortness of breath / dyspnea or wheezing       ASPIRIN LOW DOSE 81 MG EC tablet   Generic drug:  aspirin     90 tablet    TAKE 1 TABLET DAILY       * blood glucose monitoring meter device kit     1 kit    by In Vitro route daily Brand per insurance or patient choice. Please include lancets, strips and other supplies for one year. Check glucose levels once per day.       * blood glucose monitoring meter device kit     1 kit    by In Vitro route daily Brand per insurance. Include lancets, strips, etc x one year. Check glucose levels once per day.       empagliflozin 25 MG Tabs tablet    JARDIANCE    90 tablet    Take 1 tablet (25 mg) by mouth daily       fluticasone 50 MCG/ACT spray    FLONASE     Spray 2 sprays into both nostrils daily       levothyroxine 150 MCG tablet    LEVOTHROID    90 tablet    Take 1 tablet (150 mcg) by mouth daily       lisinopril 10 MG tablet    PRINIVIL/ZESTRIL    90 tablet    Take 1 tablet (10 mg) by mouth daily       metFORMIN 500 MG 24 hr tablet    GLUCOPHAGE-XR    360 tablet    Take 2 tablets (1,000 mg) by mouth 2 times daily (with meals)       MULTIVITAMINS PO      Take by mouth daily       omeprazole 20 MG tablet    priLOSEC OTC     Take 20 mg by mouth daily        pramipexole 0.5 MG tablet    MIRAPEX    90 tablet    Take 1 tablet (0.5 mg) by mouth At Bedtime       pravastatin 40 MG tablet    PRAVACHOL    90 tablet    Take 1 tablet (40 mg) by mouth daily       sitagliptin 100 MG tablet    JANUVIA    90 tablet    Take 1 tablet (100 mg) by mouth daily       * Notice:  This list has 2 medication(s) that are the same as other medications prescribed for you. Read the directions carefully, and ask your doctor or other care provider to review them with you.

## 2017-05-15 NOTE — NURSING NOTE
"Chief Complaint   Patient presents with     Diabetes     Hypertension       Initial /83 (Cuff Size: Adult Large)  Pulse 90  Temp 97  F (36.1  C) (Oral)  Wt 189 lb (85.7 kg)  SpO2 99%  BMI 33.48 kg/m2 Estimated body mass index is 33.48 kg/(m^2) as calculated from the following:    Height as of 3/27/17: 5' 3\" (1.6 m).    Weight as of this encounter: 189 lb (85.7 kg).  Medication Reconciliation: complete    Veronika Rollins LPN    "

## 2017-05-16 ASSESSMENT — PATIENT HEALTH QUESTIONNAIRE - PHQ9: SUM OF ALL RESPONSES TO PHQ QUESTIONS 1-9: 13

## 2017-05-16 ASSESSMENT — ANXIETY QUESTIONNAIRES: GAD7 TOTAL SCORE: 7

## 2017-05-24 ENCOUNTER — TELEPHONE (OUTPATIENT)
Dept: RHEUMATOLOGY | Facility: CLINIC | Age: 62
End: 2017-05-24

## 2017-05-24 NOTE — TELEPHONE ENCOUNTER
RN called and spoke with patient. She started Humira injections,  is the one who actually does injection but she is not sure if they are doing it correctly. Offered same day appointment to patient with provider and to also teach her how to properly inject Humira. Patient states today doesn't work for her. She says previous Rheumatologist had her on Gabapentin for many years and she is wondering if she should be back on this. She states she is pain and is currently taking OTC pain relievers. RN will huddle with provider in regards to Gabapentin and call patient back. Patient states it is okay to leave a detailed message but to not call until after 11:00a.m. Patient also stated that she will call back to schedule a time that her and her  can come in together for Humira injection teaching. Patient in agreement with plan, no other questions or concerns at this time.  Tana Prasad RN

## 2017-05-24 NOTE — TELEPHONE ENCOUNTER
RN huddled with provider and left detailed message per patient request asking her to call us back so we can discuss Gabapentin further and to find out what dose she was last on and how frequently she was taking, etc. Once we hear back from patient, provider will review and possibly order a one month supply of Gabapentin until patient is seen in clinic. She does have an appointment scheduled for 6/26/17.  Tana Prasad RN

## 2017-05-24 NOTE — TELEPHONE ENCOUNTER
She is in severe pain.  She is not moving well at all.  She is not sure if she is doing the humira right or not?  She needs medication for this pain.

## 2017-05-25 ENCOUNTER — TELEPHONE (OUTPATIENT)
Dept: FAMILY MEDICINE | Facility: CLINIC | Age: 62
End: 2017-05-25

## 2017-05-25 DIAGNOSIS — M79.7 FIBROMYALGIA SYNDROME: Primary | ICD-10-CM

## 2017-05-25 RX ORDER — GABAPENTIN 300 MG/1
300 CAPSULE ORAL 3 TIMES DAILY
Qty: 90 CAPSULE | Refills: 1 | Status: SHIPPED | OUTPATIENT
Start: 2017-05-25 | End: 2017-05-25

## 2017-05-25 RX ORDER — GABAPENTIN 300 MG/1
300 CAPSULE ORAL 3 TIMES DAILY
Qty: 270 CAPSULE | Refills: 1 | Status: SHIPPED | OUTPATIENT
Start: 2017-05-25 | End: 2021-09-29

## 2017-05-25 NOTE — TELEPHONE ENCOUNTER
See previous message from Dr. Travis's team.    I asked pt if she received message from Dr. Travis's team.  She states she did but did not call them back.  Pt states a 30 day supply is too expensive she needs a 90 days supply.  Pt states she has effectively used gabapentin in past.   Pt feels like she is not getting relief of her sx.  Pt states she owes $800 in medical bills now.  Pt is unsure if she will see Dr. Travis again.  Pt asking if Dr. Bienvenido Colvin can prescribe the gabapentin for 90 days.  Pt states if she can't get the care she wants at [facility] she will switch back to her previous provider who is out of network.  To provider to advise.  Randi Easley RN

## 2017-05-25 NOTE — TELEPHONE ENCOUNTER
Pt notified of provider message as written.  Pt verbalized good understanding.  Pt requesting dispense amount of 90.  Discussed with Dr. Bienvenido Colvin who gives verbal order for 90 day dispense amount.  Prescription resent.  To provider to cosign.  Randi Easley RN

## 2017-05-25 NOTE — TELEPHONE ENCOUNTER
Patient is calling to ask pcp to send in a script for gabapentin stated this new provider isn't sure if this is a drug she should be using but stated this worked the best in the past. Patient isn't happy with new rheumatologist will make an appointment to see provider again for fibro   Please call to discuss with patient  Thank you

## 2017-05-25 NOTE — TELEPHONE ENCOUNTER
Please call her back:     1. Gabapentin sent - I wrote it as 300 mg tid. But advise her to build up every week.   2. One at night for one week, then bid for one week, then tid.   3. Please also let her know that sometimes despite our best efforts, we are not able help adequately. She has a right to seek care elsewhere.   4. If she does go elsewhere, she should let us know because I was planning to contact her in the next week or so about her diabetes.    Bienvenido Colvin M.D.

## 2017-05-31 NOTE — TELEPHONE ENCOUNTER
Left message for patient to call back to clinic. [phone]  Crystal Vance CMA  5/31/2017 11:13 AM

## 2017-05-31 NOTE — TELEPHONE ENCOUNTER
Called patient,  said she was working and best time to call would be on Thursday June 1, 2017 before 10 am.  Crystal Vance CMA  5/31/2017 3:24 PM

## 2017-06-01 ENCOUNTER — TELEPHONE (OUTPATIENT)
Dept: FAMILY MEDICINE | Facility: CLINIC | Age: 62
End: 2017-06-01

## 2017-06-01 NOTE — TELEPHONE ENCOUNTER
I called patient and spoke to her . She was not available. He said her cell phone is dead.    Please call her later today or tomorrow:     1. Are you taking the Jardiance? Any side effects?   2. Are you taking the higher dose Lisinopril 10 mg daily? Any side effects?   3. Did you try the Mirapex? Is it helping your restless legs?   4. How often are you checking your glucose? What are the results?    Then route back to me.    Thanks.    Bienvenido Colvin M.D.

## 2017-06-02 NOTE — TELEPHONE ENCOUNTER
Pt returned call and left message on triage voice mail, attempted to call pt again and left message for pt to return my call.  Randi Easley RN

## 2017-06-02 NOTE — TELEPHONE ENCOUNTER
Left message for patient to return call.  4 th attempt, closing encounter.    Crystal Vance CMA  6/2/2017 8:05 AM

## 2017-06-02 NOTE — TELEPHONE ENCOUNTER
1. Yes, dizzy, nausea, felt ill.  2.  Yes, none.  3. Taking two at night and still takes asa and milk to sleep.  It helps a little bit.   4. Every morning.  All in the 200.  The lower it goes the sicker and more faint I feel, makes me fill ill.  Feels better with higher sugars.  Randi Easley RN

## 2017-06-05 NOTE — TELEPHONE ENCOUNTER
Ok let her know this is above my abilities. She needs to see endocrine.    Have her set that up - [place] [phone].    Bienvenido Colvin M.D.

## 2017-06-05 NOTE — TELEPHONE ENCOUNTER
Pt notified of provider message as written.  Pt verbalized good understanding.  Randi Easley RN

## 2017-06-05 NOTE — TELEPHONE ENCOUNTER
Pt  states pt is tied up with her mother now and can only be reached by cell phone.  He states cell number is [phone].  Randi aEsley RN

## 2017-06-08 DIAGNOSIS — M05.79 RHEUMATOID ARTHRITIS INVOLVING MULTIPLE SITES WITH POSITIVE RHEUMATOID FACTOR (H): ICD-10-CM

## 2017-06-23 ENCOUNTER — TELEPHONE (OUTPATIENT)
Dept: FAMILY MEDICINE | Facility: CLINIC | Age: 62
End: 2017-06-23

## 2017-06-23 NOTE — TELEPHONE ENCOUNTER
FW: can we chat for a bit about this pt ?  Received: Today       Charanjit Carty MD  P Fz Rn Triage Pool                   Can you please call patient for me to just remind her of upcoming appointment with Dr. Alejandro Travis, rheumatologist with [facility]  And that she is advised to keep that appointment, that I reviewed the case with Dr. Travis ?     Thank you so very much !     Charanjit Carty MD              Previous Messages       ----- Message -----      From: Alejandro Travis MD      Sent: 6/23/2017  12:18 PM        To: Charanjit Carty MD   Subject: RE: can we chat for a bit about this pt ?         She has rheumatoid arthritis and I started Humira in March, with a f/u appointment on Monday, 6/26/2017.  She was doing poorly in March and Humira may not be working for her.  We could try a different medication.     Alejandro       ----- Message -----      From: Cahranjit Carty MD      Sent: 6/22/2017   7:32 PM        To: Alejandro Travis MD   Subject: can we chat for a bit about this pt ?             You saw this patient just once or twice for her arthritis , presumably inflammatory arthritis, she's had I think rheumatoid arthritis diagnosed years and years ago and had been followed by Dr. Selena Ring, rheumatologist with [facility] . She saw me just in passing with her  and had complaints of  Can't even move from her pain. I would be inclined to insist she simply come see you in a follow up appointment but wanted your 2 cents first in case I have the case wrong. Thank you for the update  !     Charanjit Carty MD

## 2017-06-23 NOTE — TELEPHONE ENCOUNTER
Looks like patient cancelled her upcoming appointment with Dr. Travis.    Please contact patient and update her on DR. Carty's message in red below  F/u with Rheumatology is recommended.  Ask her to reschedule    Brittney Hyde RN

## 2017-06-29 ENCOUNTER — TRANSFERRED RECORDS (OUTPATIENT)
Dept: HEALTH INFORMATION MANAGEMENT | Facility: CLINIC | Age: 62
End: 2017-06-29

## 2017-06-30 ENCOUNTER — TELEPHONE (OUTPATIENT)
Dept: RHEUMATOLOGY | Facility: CLINIC | Age: 62
End: 2017-06-30

## 2017-06-30 NOTE — TELEPHONE ENCOUNTER
Spoke with patient.  Patient states she sees Dr. Ring (rheumatology) at [facility] and has seen her for the past 23 years.  She has no intentions of seeing Dr. Travis again.  Routing to Dr. Carty as DEVI.  Yvonne Garcia,

## 2017-07-03 ENCOUNTER — TELEPHONE (OUTPATIENT)
Dept: FAMILY MEDICINE | Facility: CLINIC | Age: 62
End: 2017-07-03

## 2017-07-03 RX ORDER — METFORMIN HCL 500 MG
1000 TABLET, EXTENDED RELEASE 24 HR ORAL 2 TIMES DAILY WITH MEALS
Qty: 360 TABLET | Refills: 0 | Status: SHIPPED | OUTPATIENT
Start: 2017-07-03 | End: 2018-06-06

## 2017-07-05 NOTE — TELEPHONE ENCOUNTER
Humira has been approved from 5/31/17 to 6/30/18. Form being sent to abstracting. Pharmacy notified.  Crystal Vance CMA  7/5/2017 9:41 AM

## 2017-09-08 ENCOUNTER — TELEPHONE (OUTPATIENT)
Dept: FAMILY MEDICINE | Facility: CLINIC | Age: 62
End: 2017-09-08

## 2017-09-08 NOTE — LETTER
[facility]  [address] [facility] [address] [phone]        September 8, 2017    Kenna Colindres  [address] [facility] [address]          Our records indicate that you have not scheduled for a(n)appointment with  Bienvenido Colvin MD which was recommended by your health care team. Monitoring and managing your preventative and chronic health conditions are very important to us.     If you have received your health care elsewhere, please provide us with that information so it can be documented in your chart.    Please call [phone] or message us through your [employer] account to schedule an appointment or provide information for your chart.     I look forward to seeing you and working with you on your health care needs.       *If you have already scheduled an appointment, please disregard this reminder

## 2017-09-08 NOTE — TELEPHONE ENCOUNTER
Panel Management Review      Patient has the following on her problem list:     Diabetes    ASA: Passed    Last A1C  Lab Results   Component Value Date    A1C 9.8 03/29/2017    A1C 8.6 09/26/2016    A1C 8.8 04/13/2016    A1C 8.5 10/19/2015    A1C 9.4 07/28/2015     A1C tested: FAILED    Last LDL:    Lab Results   Component Value Date    CHOL 234 03/29/2017     Lab Results   Component Value Date    HDL 48 03/29/2017     Lab Results   Component Value Date     03/29/2017     Lab Results   Component Value Date    TRIG 178 03/29/2017     Lab Results   Component Value Date    CHOLHDLRATIO 5.6 10/19/2015     Lab Results   Component Value Date    NHDL 186 03/29/2017       Is the patient on a Statin? YES             Is the patient on Aspirin? YES    Medications     HMG CoA Reductase Inhibitors    pravastatin (PRAVACHOL) 40 MG tablet    Salicylates    ASPIRIN LOW DOSE 81 MG EC tablet          Last three blood pressure readings:  BP Readings from Last 3 Encounters:   05/15/17 138/80   03/27/17 142/82   12/16/16 126/82       Date of last diabetes office visit: 5/15/2017     Tobacco History:     History   Smoking Status     Former Smoker     Quit date: 1/1/1984   Smokeless Tobacco     Never Used     Comment: smoke free household.         Hypertension   Last three blood pressure readings:  BP Readings from Last 3 Encounters:   05/15/17 138/80   03/27/17 142/82   12/16/16 126/82     Blood pressure: Passed    HTN Guidelines:  Age 18-59 BP range:  Less than 140/90  Age 60-85 with Diabetes:  Less than 140/90  Age 60-85 without Diabetes:  less than 150/90          Composite cancer screening  Chart review shows that this patient is due/due soon for the following Pap Smear  Summary:    Patient is due/failing the following:   A1C and PAP    Action needed:   Patient needs office visit for physical, diabetes.    Type of outreach:    Sent letter.    Questions for provider review:    None                                                                                                                                     Maryjo Chavez MA     Chart routed to  .

## 2017-09-08 NOTE — LETTER
Our records indicate that you have not scheduled for a(n)appointment with  Bienvenido Colvin MD which was recommended by your health care team. Monitoring and managing your preventative and chronic health conditions are very important to us.     If you have received your health care elsewhere, please provide us with that information so it can be documented in your chart.    Please call [phone] or message us through your [employer] account to schedule an appointment or provide information for your chart.     I look forward to seeing you and working with you on your health care needs.           [facility]    *If you have already scheduled an appointment, please disregard this reminder

## 2017-09-27 ENCOUNTER — OFFICE VISIT (OUTPATIENT)
Dept: FAMILY MEDICINE | Facility: CLINIC | Age: 62
End: 2017-09-27
Payer: COMMERCIAL

## 2017-09-27 VITALS
TEMPERATURE: 96.9 F | BODY MASS INDEX: 34.37 KG/M2 | OXYGEN SATURATION: 100 % | DIASTOLIC BLOOD PRESSURE: 78 MMHG | WEIGHT: 194 LBS | SYSTOLIC BLOOD PRESSURE: 121 MMHG | HEART RATE: 88 BPM

## 2017-09-27 DIAGNOSIS — E03.9 HYPOTHYROIDISM, UNSPECIFIED TYPE: ICD-10-CM

## 2017-09-27 DIAGNOSIS — I10 ESSENTIAL HYPERTENSION WITH GOAL BLOOD PRESSURE LESS THAN 140/90: ICD-10-CM

## 2017-09-27 DIAGNOSIS — E78.5 DYSLIPIDEMIA: ICD-10-CM

## 2017-09-27 LAB
ANION GAP SERPL CALCULATED.3IONS-SCNC: 7 MMOL/L (ref 3–14)
BUN SERPL-MCNC: 16 MG/DL (ref 7–30)
CALCIUM SERPL-MCNC: 9.1 MG/DL (ref 8.5–10.1)
CHLORIDE SERPL-SCNC: 102 MMOL/L (ref 94–109)
CHOLEST SERPL-MCNC: 185 MG/DL
CO2 SERPL-SCNC: 28 MMOL/L (ref 20–32)
CREAT SERPL-MCNC: 0.69 MG/DL (ref 0.52–1.04)
CREAT UR-MCNC: 43 MG/DL
GFR SERPL CREATININE-BSD FRML MDRD: 86 ML/MIN/1.7M2
GLUCOSE SERPL-MCNC: 202 MG/DL (ref 70–99)
HBA1C MFR BLD: 10.3 % (ref 4.3–6)
HDLC SERPL-MCNC: 43 MG/DL
LDLC SERPL CALC-MCNC: 101 MG/DL
MICROALBUMIN UR-MCNC: <5 MG/L
MICROALBUMIN/CREAT UR: NORMAL MG/G CR (ref 0–25)
NONHDLC SERPL-MCNC: 142 MG/DL
POTASSIUM SERPL-SCNC: 4.4 MMOL/L (ref 3.4–5.3)
SODIUM SERPL-SCNC: 137 MMOL/L (ref 133–144)
TRIGL SERPL-MCNC: 203 MG/DL
TSH SERPL DL<=0.005 MIU/L-ACNC: 2.46 MU/L (ref 0.4–4)

## 2017-09-27 PROCEDURE — 83036 HEMOGLOBIN GLYCOSYLATED A1C: CPT | Performed by: FAMILY MEDICINE

## 2017-09-27 PROCEDURE — 99207 C FOOT EXAM  NO CHARGE: CPT | Performed by: FAMILY MEDICINE

## 2017-09-27 PROCEDURE — 80061 LIPID PANEL: CPT | Performed by: FAMILY MEDICINE

## 2017-09-27 PROCEDURE — 36415 COLL VENOUS BLD VENIPUNCTURE: CPT | Performed by: FAMILY MEDICINE

## 2017-09-27 PROCEDURE — 80048 BASIC METABOLIC PNL TOTAL CA: CPT | Performed by: FAMILY MEDICINE

## 2017-09-27 PROCEDURE — 99214 OFFICE O/P EST MOD 30 MIN: CPT | Performed by: FAMILY MEDICINE

## 2017-09-27 PROCEDURE — 82043 UR ALBUMIN QUANTITATIVE: CPT | Performed by: FAMILY MEDICINE

## 2017-09-27 PROCEDURE — 84443 ASSAY THYROID STIM HORMONE: CPT | Performed by: FAMILY MEDICINE

## 2017-09-27 NOTE — PROGRESS NOTES
"HPI:    Kenna is a 62 year old female here to follow-up on a multitude of issues:    Please note, compliance continues to be an issue. For example she will not check her glucose levels, doesn't believe the dx of diabetes, she will not get PAP in addition to other items below.    Previous chest pain - She says this has not recurred. From previous notes \"she gives the history kind of as a \"by the way.\" She also minimizes the situation and is vague with the history. The best I can put together, she has had 2 episodes in the last few weeks of left sided chest pain, going in to the left shoulder, accompanied by shortness of breath. No nausea or diaphoresis. These episodes occurred while walking and stopped at rest. They have not occurred before or since. Although her description is vague, I think this is concerning for ACS. I chided her for not going to ED or calling 911 and gave her strict instructions for the future. For now I explained she needs an EKG, stress test and/or cardiology consult. She says the symptoms have gone away and the only reason she informed me was to \"just let you know.\" She declined any further instructions despite my insistence. In the end she was getting a bit irritated with me so I told her she has a right to refuse. She said she will go to ED if this happens again.\"    DM2, please note she is ok with the phrase \"high sugars\" but does not want the label diabetes (without retinopathy, without nephropathy, without nephropathy) - Dx'd around before 2012. Refuses to check home glucose levels. Denies symptoms of hypoglycemia.  Evaluation and treatment:    Eye exam 7/9/15 - refuses to set up another one   Foot exam normal 9/27/17   Urine albumin negative 9/27/17   Metformin 1000 mg bid - no side effects   Januvia 100 mg qd - ran out in April - just restarted one week ago   Jardiance 25 mg qd - reminded her this is a high risk med, regarding ketoacidosis etc. She verbalized understanding.   Trulicity " 0.75 mg SQ weekly - never tried this.   Does not want to talk about insulin or any other injections   Counseling: about insulin resistance, in relation to excessive weight, diet and exercise. Also about checking glucose levels regularly and adherence to prescribed medications, symptoms of  hypoglycemia and how to correct this by taking sugar pills, juice, regular pop or milk. Discussed about proper foot care including wearing well fitting, well padded shoes.   Today we checked urine albumin (normal), A1c and glucose (high)   I called her back and discussed the importance of controlling her diabetes and intensifying her treatment - she would not hear of it!   In fact she told me she is thinking about stopping her medications - again I told her this is a her right!    Lab Results   Component Value Date    A1C 10.3 09/27/2017    A1C 9.8 03/29/2017    A1C 8.6 09/26/2016    A1C 8.8 04/13/2016    A1C 8.5 10/19/2015       HTN - fairly controlled in clinic Does not check at home.  Treatment:   Lisinopril 10 mg qd. No side effects.   Continue same treatment.    Last Basic Metabolic Panel:  Lab Results   Component Value Date     09/27/2017      Lab Results   Component Value Date    POTASSIUM 4.4 09/27/2017     Lab Results   Component Value Date    CHLORIDE 102 09/27/2017     Lab Results   Component Value Date    ANANTH 9.1 09/27/2017     Lab Results   Component Value Date    CO2 28 09/27/2017     Lab Results   Component Value Date    BUN 16 09/27/2017     Lab Results   Component Value Date    CR 0.69 09/27/2017     Lab Results   Component Value Date     09/27/2017       CBC RESULTS:   Recent Labs   Lab Test  04/13/16   1001   WBC  6.2   RBC  4.96   HGB  14.4   HCT  43.2   MCV  87   MCH  29.0   MCHC  33.3   RDW  13.2   PLT  189     Dyslipidemia - no h/o vascular disease like CAD, PAD, CVA. But has h/o diabetes. Per ATP 4 moderate to high intensity statin recommended.  Treatment:   Pravachol 40 mg qd - no side  effects.   Continue same treatment although I am not sure if she is taking this regularly.     Recent Labs   Lab Test  09/27/17   0912  03/29/17   0850   10/19/15   0801  07/05/14   0933   CHOL  185  234*   < >  231*  189   HDL  43*  48*   < >  41*  41*   LDL  101*  150*   < >  158*  126   TRIG  203*  178*   < >  161*  106   CHOLHDLRATIO   --    --    --   5.6*  4.6    < > = values in this interval not displayed.       Hypothyroid - denies thyroid type symptoms like temperature intolerance.  Evaluation and treatment:    Synthroid 150 mg qd.   Continue same treatment.    TSH   Date Value Ref Range Status   09/27/2017 2.46 0.40 - 4.00 mU/L Final     Obesity/sleep apnea - uses CPAP. Has had lap band around 2004.   Evaluation and treatment:    Referred back to sleep medicine but she did not go.    Wt Readings from Last 5 Encounters:   09/27/17 194 lb (88 kg)   05/15/17 189 lb (85.7 kg)   03/27/17 195 lb (88.5 kg)   12/16/16 200 lb (90.7 kg)   09/30/16 194 lb (88 kg)       Bilateral shoulder pain - her rheumatologist suggested injection. I previously referred her to ortho but she did not go.     RA/fibromyalgia - follows with rheumatology.    Insomnia and restless legs - Sleeps about 4 hours per night. Has hard time falling asleep and staying asleep. Also restless legs at night. Walking around helps.   Treatment:   Aspirin and milk help   Trazodone 150 mg prn somewhat helped but not much.   Melatonin no help   Mirapex - not sure if it helps    Allergies - symptoms are red eyes, itchy eyes and tearing. She tried OTC benadryl and eye drops which help. Mild nasal drainage. No sneezing or coughing. She denies pets at home.    Preventive:    Flu shot: declines  Pneumovax: declines  Prevnar: declines  Shingles: declines    Immunization History   Administered Date(s) Administered     Influenza (IIV3) 11/12/2009     TDAP Vaccine (Adacel) 01/01/2008     Mammogram: negative 12/15/16     PAP: declined repeatedly including today!!  She says the last time a PAP was done she was sent to the Hospital.   I reviewed records with her - she had post menopausal bleeding which was found to be due to polyp - removed in the Hospital - benign path.   No further bleeding. Explaining the difference between polyp and PAP did not make a difference!    Colon CA screen: Colonoscopy 8/14/09 per patient - negative.    Hep C screen: negative 8/14/13    Advanced Directive: referred previously.      ROS:    Const: No fevers, weight changes or night sweats recently.  ENT: No sore throat or ear pain.  Resp: No cough or shortness of breath.  CV: No dizziness or cardiac palpitations.  GI: No nausea, vomiting, diarrhea or constipation. Denies blood in stools or black stools.  : No dysuria, frequency or hematuria.    SH:    Marital status:   Kids: 2  Employment: works at [employer], physical job  Exercise: active in general.  Tobacco: no  Etoh: rarely  Recreational drugs: no  Caffeine:     Exam:    /78 (Cuff Size: Adult Large)  Pulse 88  Temp 96.9  F (36.1  C) (Oral)  Wt 194 lb (88 kg)  SpO2 100%  BMI 34.37 kg/m2    Gen: Healthy appearing female in no acute distress  ENT: TM's normal. Oropharynx normal. Oral mucosa moist without lesions.  Eyes: Conjunctiva and sclera normal. Pupils react normally to light. No nystagmus.  Neck: No enlarged lymph nodes, thyromegally or other masses.  Lungs: Good air movement and otherwise clear.  CV: Heart RRR with no murmurs. No JVD, carotid bruits or leg edema.  Foot exam: Both feet appear normal by inspection. No calluses or other lesions. DP pluses are normal. The tips of the toes are warm with good capillary refill. Sensation intact per monofilament.      Assessment and Plan - Decision Making    1. Uncontrolled type 2 diabetes mellitus without complication, without long-term current use of insulin (H)  Per HPI  - Hemoglobin A1c  - FOOT EXAM  - Albumin Random Urine Quantitative with Creat Ratio    2. Essential  hypertension with goal blood pressure less than 140/90  Per HPI  - Basic metabolic panel    3. Dyslipidemia  Per HPI  - Lipid panel reflex to direct LDL    4. Hypothyroidism, unspecified type  Per HPI  - TSH with free T4 reflex      Written instructions given as follows:    Patient Instructions   I will contact you about your test results via My Chart along with further instructions.

## 2017-09-27 NOTE — MR AVS SNAPSHOT
After Visit Summary   9/27/2017    Kenna Colindres    MRN: [id number]           Patient Information     Date Of Birth          1955        Visit Information        Provider Department      9/27/2017 8:10 AM Bienvenido Colvin MD [facility]        Today's Diagnoses     Uncontrolled type 2 diabetes mellitus without complication, without long-term current use of insulin (H)    -  1    Essential hypertension with goal blood pressure less than 140/90        Dyslipidemia        Hypothyroidism, unspecified type          Care Instructions    I will contact you about your test results via My Chart along with further instructions.          Follow-ups after your visit        Who to contact     If you have questions or need follow up information about today's clinic visit or your schedule please contact [facility] directly at [phone].  Normal or non-critical lab and imaging results will be communicated to you by MyChart, letter or phone within 4 business days after the clinic has received the results. If you do not hear from us within 7 days, please contact the clinic through [employer] or phone. If you have a critical or abnormal lab result, we will notify you by phone as soon as possible.  Submit refill requests through [employer] or call your pharmacy and they will forward the refill request to us. Please allow 3 business days for your refill to be completed.          Additional Information About Your Visit        MyChart Information     [employer] gives you secure access to your electronic health record. If you see a primary care provider, you can also send messages to your care team and make appointments. If you have questions, please call your primary care clinic.  If you do not have a primary care provider, please call [phone] and they will assist you.        Care EveryWhere ID     This is your Care EveryWhere ID. This could be used by other organizations to access your  [facility] medical records  [id number]        Your Vitals Were     Pulse Temperature Pulse Oximetry BMI (Body Mass Index)          88 96.9  F (36.1  C) (Oral) 100% 34.37 kg/m2         Blood Pressure from Last 3 Encounters:   09/27/17 121/78   05/15/17 138/80   03/27/17 142/82    Weight from Last 3 Encounters:   09/27/17 194 lb (88 kg)   05/15/17 189 lb (85.7 kg)   03/27/17 195 lb (88.5 kg)              We Performed the Following     Albumin Random Urine Quantitative with Creat Ratio     Basic metabolic panel     FOOT EXAM     Hemoglobin A1c     Lipid panel reflex to direct LDL     TSH with free T4 reflex        Primary Care Provider Office Phone # Fax #    Bienvenido Colvin [phone] [address] [facility] [address]        Equal Access to Services     DAY HECTOR : Hadii hannah dos santos hadasho Soomaali, waaxda luqadaha, qaybta kaalmada adeegyada, velvet schmidt . So [facility] [phone].    ATENCIÓN: Si habla español, tiene a pérez disposición servicios gratuitos de asistencia lingüística. Llame al [phone].    We comply with applicable federal civil rights laws and [place] laws. We do not discriminate on the basis of race, color, national origin, age, disability sex, sexual orientation or gender identity.            Thank you!     Thank you for choosing [facility]  for your care. Our goal is always to provide you with excellent care. Hearing back from our patients is one way we can continue to improve our services. Please take a few minutes to complete the written survey that you may receive in the mail after your visit with us. Thank you!             Your Updated Medication List - Protect others around you: Learn how to safely use, store and throw away your medicines at www.disposemymeds.org.          This list is accurate as of: 9/27/17  9:06 AM.  Always use your most recent med list.                   Brand Name Dispense Instructions for use Diagnosis     albuterol 108 (90 BASE) MCG/ACT Inhaler    PROAIR HFA/PROVENTIL HFA/VENTOLIN HFA    3 Inhaler    Inhale 2 puffs into the lungs every 6 hours as needed for shortness of breath / dyspnea or wheezing    Bronchospasm       ASPIRIN LOW DOSE 81 MG EC tablet   Generic drug:  aspirin     90 tablet    TAKE 1 TABLET DAILY    Uncontrolled diabetes mellitus type 2 without complications (H)       empagliflozin 25 MG Tabs tablet    JARDIANCE    90 tablet    Take 1 tablet (25 mg) by mouth daily    Uncontrolled type 2 diabetes mellitus without complication, without long-term current use of insulin (H)       gabapentin 300 MG capsule    NEURONTIN    270 capsule    Take 1 capsule (300 mg) by mouth 3 times daily    Fibromyalgia syndrome       levothyroxine 150 MCG tablet    LEVOTHROID    90 tablet    Take 1 tablet (150 mcg) by mouth daily    Hypothyroidism, unspecified type       lisinopril 10 MG tablet    PRINIVIL/ZESTRIL    90 tablet    Take 1 tablet (10 mg) by mouth daily    Essential hypertension with goal blood pressure less than 140/90       metFORMIN 500 MG 24 hr tablet    GLUCOPHAGE-XR    360 tablet    Take 2 tablets (1,000 mg) by mouth 2 times daily (with meals)    Uncontrolled diabetes mellitus type 2 without complications (H)       MULTIVITAMINS PO      Take by mouth daily        omeprazole 20 MG tablet    priLOSEC OTC     Take 20 mg by mouth daily    Hiatal hernia       pramipexole 0.5 MG tablet    MIRAPEX    90 tablet    Take 1 tablet (0.5 mg) by mouth At Bedtime    Restless legs syndrome       pravastatin 40 MG tablet    PRAVACHOL    90 tablet    Take 1 tablet (40 mg) by mouth daily    Hyperlipidemia LDL goal <70       sitagliptin 100 MG tablet    JANUVIA    90 tablet    Take 1 tablet (100 mg) by mouth daily    Uncontrolled diabetes mellitus type 2 without complications (H)       tocilizumab 162 MG/0.9ML subcutaneous injection    ACTEMRA     Every week SQ

## 2017-09-27 NOTE — NURSING NOTE
"Chief Complaint   Patient presents with     Hypertension     Diabetes       Initial /78 (Cuff Size: Adult Large)  Pulse 88  Temp 96.9  F (36.1  C) (Oral)  Wt 194 lb (88 kg)  SpO2 100%  BMI 34.37 kg/m2 Estimated body mass index is 34.37 kg/(m^2) as calculated from the following:    Height as of 3/27/17: 5' 3\" (1.6 m).    Weight as of this encounter: 194 lb (88 kg).  Medication Reconciliation: complete    Veronika Rollins LPN    "

## 2017-09-27 NOTE — PROGRESS NOTES
Kenna does not read her My Chart messages. For discussion of labs and plan please see office visit note today.    Bienvenido Colvin M.D.

## 2017-10-09 ENCOUNTER — OFFICE VISIT (OUTPATIENT)
Dept: FAMILY MEDICINE | Facility: CLINIC | Age: 62
End: 2017-10-09
Payer: COMMERCIAL

## 2017-10-09 VITALS
BODY MASS INDEX: 34.73 KG/M2 | TEMPERATURE: 97.5 F | DIASTOLIC BLOOD PRESSURE: 80 MMHG | HEART RATE: 96 BPM | SYSTOLIC BLOOD PRESSURE: 129 MMHG | HEIGHT: 63 IN | WEIGHT: 196 LBS | OXYGEN SATURATION: 100 %

## 2017-10-09 DIAGNOSIS — Z98.84 HX OF LAPAROSCOPIC GASTRIC BANDING: ICD-10-CM

## 2017-10-09 DIAGNOSIS — G47.00 INSOMNIA, UNSPECIFIED TYPE: Primary | ICD-10-CM

## 2017-10-09 DIAGNOSIS — Z12.4 PAP SMEAR FOR CERVICAL CANCER SCREENING: ICD-10-CM

## 2017-10-09 PROCEDURE — 99213 OFFICE O/P EST LOW 20 MIN: CPT | Performed by: PHYSICIAN ASSISTANT

## 2017-10-09 PROCEDURE — G0123 SCREEN CERV/VAG THIN LAYER: HCPCS | Performed by: PHYSICIAN ASSISTANT

## 2017-10-09 PROCEDURE — 87624 HPV HI-RISK TYP POOLED RSLT: CPT | Performed by: PHYSICIAN ASSISTANT

## 2017-10-09 NOTE — PATIENT INSTRUCTIONS
Call your insurance to find out which general surgeon is covered to take care of your gastric band.    Call to schedule your sleep study.    Follow up with primary care provider as directed.

## 2017-10-09 NOTE — PROGRESS NOTES
SUBJECTIVE:   CC: Kenna Colindres is an 62 year old woman who presents for preventive health visit.     Physical   Annual:     Getting at least 3 servings of Calcium per day::  Yes    Bi-annual eye exam::  Yes    Dental care twice a year::  NO    Sleep apnea or symptoms of sleep apnea::  Daytime drowsiness and Sleep apnea    Diet::  Regular (no restrictions)    Frequency of exercise::  2-3 days/week      She is here for her Pap smear and declines a physical.     She does want a referral to have a sleep study done as her CPAP machine is broken and she is not sleeping well. She does snore intermittently. She had a sleep study many years ago and was diagnosed with sleep apnea requiring a CPAP machine.     She would like to see a surgeon to discuss having her lap band removed, which was placed more than 5 years ago. She denies any problems with the lap band, but was told it should be removed by her 's surgeon. Will refer to general surgery.    She denies any other concerns today.         Today's PHQ-2 Score:   PHQ-2 ( 1999 Pf[employer]) 10/9/2017   Q1: Little interest or pleasure in doing things 0   Q2: Feeling down, depressed or hopeless 0   PHQ-2 Score 0   PHQ-2 Score Incomplete       Abuse: Current or Past(Physical, Sexual or Emotional)- No  Do you feel safe in your environment - Yes    Social History   Substance Use Topics     Smoking status: Former Smoker     Quit date: 1/1/1984     Smokeless tobacco: Never Used      Comment: smoke free household.     Alcohol use Yes      Comment: rarely     The patient does not drink >3 drinks per day nor >7 drinks per week.    Reviewed orders with patient.  Reviewed health maintenance and updated orders accordingly - Yes  BP Readings from Last 3 Encounters:   10/09/17 129/80   09/27/17 121/78   05/15/17 138/80    Wt Readings from Last 3 Encounters:   10/09/17 196 lb (88.9 kg)   09/27/17 194 lb (88 kg)   05/15/17 189 lb (85.7 kg)                  Patient Active Problem List    Diagnosis     Advanced directives, counseling/discussion     Hiatal hernia     Fibromyalgia syndrome     RA (rheumatoid arthritis) (H)     BMI 36-39     Chronic rhinitis     Insomnia     Olecranon bursitis of left elbow     Postmenopausal bleeding     History of gastric bypass     Insomnia, unspecified insomnia     Essential hypertension with goal blood pressure less than 140/90     Hypothyroidism, unspecified type     Dyslipidemia     Uncontrolled type 2 diabetes mellitus without complication, without long-term current use of insulin (H)     Past Surgical History:   Procedure Laterality Date     CHOLECYSTECTOMY       DILATION AND CURETTAGE  11/21/13    PMB, cervical polyp     ENT SURGERY      tonsils X 2     GI SURGERY      lap band     HERNIA REPAIR       SOFT TISSUE SURGERY      multiple infections       Social History   Substance Use Topics     Smoking status: Former Smoker     Quit date: 1/1/1984     Smokeless tobacco: Never Used      Comment: smoke free household.     Alcohol use Yes      Comment: rarely     Family History   Problem Relation Age of Onset     Asthma Daughter          Current Outpatient Prescriptions   Medication Sig Dispense Refill     tocilizumab (ACTEMRA) 162 MG/0.9ML subcutaneous injection Every week SQ       metFORMIN (GLUCOPHAGE-XR) 500 MG 24 hr tablet Take 2 tablets (1,000 mg) by mouth 2 times daily (with meals) 360 tablet 0     gabapentin (NEURONTIN) 300 MG capsule Take 1 capsule (300 mg) by mouth 3 times daily 270 capsule 1     lisinopril (PRINIVIL/ZESTRIL) 10 MG tablet Take 1 tablet (10 mg) by mouth daily 90 tablet 1     pravastatin (PRAVACHOL) 40 MG tablet Take 1 tablet (40 mg) by mouth daily 90 tablet 3     empagliflozin (JARDIANCE) 25 MG TABS tablet Take 1 tablet (25 mg) by mouth daily 90 tablet 1     pramipexole (MIRAPEX) 0.5 MG tablet Take 1 tablet (0.5 mg) by mouth At Bedtime 90 tablet 1     sitagliptin (JANUVIA) 100 MG tablet Take 1 tablet (100 mg) by mouth daily 90 tablet 1  "    levothyroxine (LEVOTHROID) 150 MCG tablet Take 1 tablet (150 mcg) by mouth daily 90 tablet 3     ASPIRIN LOW DOSE 81 MG EC tablet TAKE 1 TABLET DAILY 90 tablet 3     albuterol (PROAIR HFA, PROVENTIL HFA, VENTOLIN HFA) 108 (90 BASE) MCG/ACT inhaler Inhale 2 puffs into the lungs every 6 hours as needed for shortness of breath / dyspnea or wheezing 3 Inhaler 4     omeprazole (PRILOSEC OTC) 20 MG tablet Take 20 mg by mouth daily       Multiple Vitamin (MULTIVITAMINS PO) Take by mouth daily       Allergies   Allergen Reactions     Contrast Dye      Sulfa Drugs            Patient over age 50, mutual decision to screen reflected in health maintenance.      Pertinent mammograms are reviewed under the imaging tab.  History of abnormal Pap smear: NO - age 30-65 PAP every 5 years with negative HPV co-testing recommended    Reviewed and updated as needed this visit by clinical staff  Tobacco  Allergies  Meds  Med Hx  Surg Hx  Fam Hx  Soc Hx        Reviewed and updated as needed this visit by Provider              ROS:  C: NEGATIVE for fever, chills, change in weight  : NEGATIVE for unusual urinary or vaginal symptoms. No vaginal bleeding.  P: NEGATIVE for changes in mood or affect      OBJECTIVE:   /80  Pulse 96  Temp 97.5  F (36.4  C) (Oral)  Ht 5' 3\" (1.6 m)  Wt 196 lb (88.9 kg)  SpO2 100%  BMI 34.72 kg/m2  EXAM:  GENERAL APPEARANCE: healthy, alert and no distress   (female): normal female external genitalia, normal urethral meatus, vaginal mucosal atrophy noted and normal cervix, adnexae, and uterus without masses.  PSYCH: mentation appears normal and affect normal/bright    ASSESSMENT/PLAN:       ICD-10-CM    1. Insomnia, unspecified type G47.00 SLEEP EVALUATION & MANAGEMENT REFERRAL - ADULT   2. Hx of laparoscopic gastric banding Z98.84 GENERAL SURG ADULT REFERRAL   3. Pap smear for cervical cancer screening Z12.4 Pap imaged thin layer screen with HPV - recommended age 30 - 65 years (select HPV " "order below)     HPV High Risk Types DNA Cervical       COUNSELING:  Reviewed preventive health counseling, as reflected in patient instructions         reports that she quit smoking about 33 years ago. She has never used smokeless tobacco.    Estimated body mass index is 34.72 kg/(m^2) as calculated from the following:    Height as of this encounter: 5' 3\" (1.6 m).    Weight as of this encounter: 196 lb (88.9 kg).   Weight management plan: Discussed healthy diet and exercise guidelines and patient will follow up in 12 months in clinic to re-evaluate.    Counseling Resources:  ATP IV Guidelines  Pooled Cohorts Equation Calculator  Breast Cancer Risk Calculator  FRAX Risk Assessment  ICSI Preventive Guidelines  Dietary Guidelines for Americans, 2010  USDA's MyPlate  ASA Prophylaxis  Lung CA Screening    Mary Reaves PA-C  [facility] [place]  Answers for HPI/ROS submitted by the patient on 10/9/2017   PHQ-2 Score: Incomplete    "

## 2017-10-09 NOTE — LETTER
October 16, 2017    Kenna Colindres  [address] [place] [id number]    Dear Kenna,  We are happy to inform you that your PAP smear result from 10/9/17 is normal.  We are now able to do a follow up test on PAP smears. The DNA test is for HPV (Human Papilloma Virus). Cervical cancer is closely linked with certain types of HPV. Your result showed no evidence of high risk HPV.  Therefore we recommend you return in 5 years for your next pap smear and HPV test.  You will still need to return to the clinic every year for an annual exam and other preventive tests.  Please contact the clinic at [phone] with any questions.  Sincerely,    Mary Reaves PA-C/jose

## 2017-10-09 NOTE — NURSING NOTE
"Chief Complaint   Patient presents with     Gyn Exam     pap only     Sleep Problem     not sleeping - had sleep study and has machine -       Initial /80  Pulse 96  Temp 97.5  F (36.4  C) (Oral)  Ht 5' 3\" (1.6 m)  Wt 196 lb (88.9 kg)  SpO2 100%  BMI 34.72 kg/m2 Estimated body mass index is 34.72 kg/(m^2) as calculated from the following:    Height as of this encounter: 5' 3\" (1.6 m).    Weight as of this encounter: 196 lb (88.9 kg).  Medication Reconciliation: complete  Viki Syed M.A.  .  "

## 2017-10-09 NOTE — MR AVS SNAPSHOT
After Visit Summary   10/9/2017    Kenna Colindres    MRN: [id number]           Patient Information     Date Of Birth          1955        Visit Information        Provider Department      10/9/2017 8:40 AM Mary Reaves PA-C [facility]        Today's Diagnoses     Insomnia, unspecified type    -  1    Hx of laparoscopic gastric banding        Pap smear for cervical cancer screening          Care Instructions    Call your insurance to find out which general surgeon is covered to take care of your gastric band.    Call to schedule your sleep study.    Follow up with primary care provider as directed.          Follow-ups after your visit        Additional Services     GENERAL SURG ADULT REFERRAL       Your provider has referred you to: [facility]: [facility] [phone]   [email or web address]    Please be aware that coverage of these services is subject to the terms and limitations of your health insurance plan.  Call member services at your health plan with any benefit or coverage questions.      Please bring the following with you to your appointment:    (1) Any X-Rays, CTs or MRIs which have been performed.  Contact the facility where they were done to arrange for  prior to your scheduled appointment.   (2) List of current medications   (3) This referral request   (4) Any documents/labs given to you for this referral            SLEEP EVALUATION & MANAGEMENT REFERRAL - ADULT       Please be aware that coverage of these services is subject to the terms and limitations of your health insurance plan.  Call member services at your health plan with any benefit or coverage questions.      Please bring the following to your appointment:    >>   List of current medications   >>   This referral request   >>   Any documents/labs given to you for this referral    [facility] [place] [facility] [phone] (Age 15 and up)          "         Future tests that were ordered for you today     Open Future Orders        Priority Expected Expires Ordered    SLEEP EVALUATION & MANAGEMENT REFERRAL - ADULT Routine  10/9/2018 10/9/2017            Who to contact     If you have questions or need follow up information about today's clinic visit or your schedule please contact [facility] directly at [phone].  Normal or non-critical lab and imaging results will be communicated to you by MyChart, letter or phone within 4 business days after the clinic has received the results. If you do not hear from us within 7 days, please contact the clinic through [employer] or phone. If you have a critical or abnormal lab result, we will notify you by phone as soon as possible.  Submit refill requests through [employer] or call your pharmacy and they will forward the refill request to us. Please allow 3 business days for your refill to be completed.          Additional Information About Your Visit        [employer] Information     [employer] gives you secure access to your electronic health record. If you see a primary care provider, you can also send messages to your care team and make appointments. If you have questions, please call your primary care clinic.  If you do not have a primary care provider, please call [phone] and they will assist you.        Care EveryWhere ID     This is your Care EveryWhere ID. This could be used by other organizations to access your [place] medical records  [id number]        Your Vitals Were     Pulse Temperature Height Pulse Oximetry BMI (Body Mass Index)       96 97.5  F (36.4  C) (Oral) 5' 3\" (1.6 m) 100% 34.72 kg/m2        Blood Pressure from Last 3 Encounters:   10/09/17 129/80   09/27/17 121/78   05/15/17 138/80    Weight from Last 3 Encounters:   10/09/17 196 lb (88.9 kg)   09/27/17 194 lb (88 kg)   05/15/17 189 lb (85.7 kg)              We Performed the Following     GENERAL SURG ADULT REFERRAL     HPV High Risk " Types DNA Cervical     Pap imaged thin layer screen with HPV - recommended age 30 - 65 years (select HPV order below)        Primary Care Provider Office Phone # Fax #    Bienvenido Colvin [phone] [address] [facility] [address]        Equal Access to Services     DAY HECTOR : Hadii hannah ku hadasho Soomaali, waaxda luqadaha, qaybta kaalmada adeegyada, waxkahlil youngdamonmarissa rodriguez. So [facility] [phone].    ATENCIÓN: Si habla español, tiene a pérez disposición servicios gratuitos de asistencia lingüística. Llame al [phone].    We comply with applicable federal civil rights laws and [place] laws. We do not discriminate on the basis of race, color, national origin, age, disability, sex, sexual orientation, or gender identity.            Thank you!     Thank you for choosing [facility]  for your care. Our goal is always to provide you with excellent care. Hearing back from our patients is one way we can continue to improve our services. Please take a few minutes to complete the written survey that you may receive in the mail after your visit with us. Thank you!             Your Updated Medication List - Protect others around you: Learn how to safely use, store and throw away your medicines at www.disposemymeds.org.          This list is accurate as of: 10/9/17  9:00 AM.  Always use your most recent med list.                   Brand Name Dispense Instructions for use Diagnosis    albuterol 108 (90 BASE) MCG/ACT Inhaler    PROAIR HFA/PROVENTIL HFA/VENTOLIN HFA    3 Inhaler    Inhale 2 puffs into the lungs every 6 hours as needed for shortness of breath / dyspnea or wheezing    Bronchospasm       ASPIRIN LOW DOSE 81 MG EC tablet   Generic drug:  aspirin     90 tablet    TAKE 1 TABLET DAILY    Uncontrolled diabetes mellitus type 2 without complications (H)       empagliflozin 25 MG Tabs tablet    JARDIANCE    90 tablet    Take 1 tablet (25 mg) by mouth daily     Uncontrolled type 2 diabetes mellitus without complication, without long-term current use of insulin (H)       gabapentin 300 MG capsule    NEURONTIN    270 capsule    Take 1 capsule (300 mg) by mouth 3 times daily    Fibromyalgia syndrome       levothyroxine 150 MCG tablet    LEVOTHROID    90 tablet    Take 1 tablet (150 mcg) by mouth daily    Hypothyroidism, unspecified type       lisinopril 10 MG tablet    PRINIVIL/ZESTRIL    90 tablet    Take 1 tablet (10 mg) by mouth daily    Essential hypertension with goal blood pressure less than 140/90       metFORMIN 500 MG 24 hr tablet    GLUCOPHAGE-XR    360 tablet    Take 2 tablets (1,000 mg) by mouth 2 times daily (with meals)    Uncontrolled diabetes mellitus type 2 without complications (H)       MULTIVITAMINS PO      Take by mouth daily        omeprazole 20 MG tablet    priLOSEC OTC     Take 20 mg by mouth daily    Hiatal hernia       pramipexole 0.5 MG tablet    MIRAPEX    90 tablet    Take 1 tablet (0.5 mg) by mouth At Bedtime    Restless legs syndrome       pravastatin 40 MG tablet    PRAVACHOL    90 tablet    Take 1 tablet (40 mg) by mouth daily    Hyperlipidemia LDL goal <70       sitagliptin 100 MG tablet    JANUVIA    90 tablet    Take 1 tablet (100 mg) by mouth daily    Uncontrolled diabetes mellitus type 2 without complications (H)       tocilizumab 162 MG/0.9ML subcutaneous injection    ACTEMRA     Every week SQ

## 2017-10-11 LAB
COPATH REPORT: NORMAL
PAP: NORMAL

## 2017-10-12 LAB
FINAL DIAGNOSIS: NORMAL
HPV HR 12 DNA CVX QL NAA+PROBE: NEGATIVE
HPV16 DNA SPEC QL NAA+PROBE: NEGATIVE
HPV18 DNA SPEC QL NAA+PROBE: NEGATIVE
SPECIMEN DESCRIPTION: NORMAL

## 2017-11-06 ENCOUNTER — TRANSFERRED RECORDS (OUTPATIENT)
Dept: HEALTH INFORMATION MANAGEMENT | Facility: CLINIC | Age: 62
End: 2017-11-06

## 2017-12-18 ENCOUNTER — RADIANT APPOINTMENT (OUTPATIENT)
Dept: MAMMOGRAPHY | Facility: CLINIC | Age: 62
End: 2017-12-18
Attending: FAMILY MEDICINE
Payer: COMMERCIAL

## 2017-12-18 DIAGNOSIS — Z12.31 VISIT FOR SCREENING MAMMOGRAM: ICD-10-CM

## 2017-12-18 PROCEDURE — G0202 SCR MAMMO BI INCL CAD: HCPCS | Performed by: STUDENT IN AN ORGANIZED HEALTH CARE EDUCATION/TRAINING PROGRAM

## 2018-01-30 ENCOUNTER — TELEPHONE (OUTPATIENT)
Dept: FAMILY MEDICINE | Facility: CLINIC | Age: 63
End: 2018-01-30

## 2018-01-30 NOTE — TELEPHONE ENCOUNTER
Panel Management Review      Patient has the following on her problem list:     Diabetes    ASA: Passed    Last A1C  Lab Results   Component Value Date    A1C 10.3 09/27/2017    A1C 9.8 03/29/2017    A1C 8.6 09/26/2016    A1C 8.8 04/13/2016    A1C 8.5 10/19/2015     A1C tested: FAILED    Last LDL:    Lab Results   Component Value Date    CHOL 185 09/27/2017     Lab Results   Component Value Date    HDL 43 09/27/2017     Lab Results   Component Value Date     09/27/2017     Lab Results   Component Value Date    TRIG 203 09/27/2017     Lab Results   Component Value Date    CHOLHDLRATIO 5.6 10/19/2015     Lab Results   Component Value Date    NHDL 142 09/27/2017       Is the patient on a Statin? YES             Is the patient on Aspirin? YES    Medications     HMG CoA Reductase Inhibitors    pravastatin (PRAVACHOL) 40 MG tablet    Salicylates    ASPIRIN LOW DOSE 81 MG EC tablet          Last three blood pressure readings:  BP Readings from Last 3 Encounters:   10/09/17 129/80   09/27/17 121/78   05/15/17 138/80       Date of last diabetes office visit: 09-     Tobacco History:     History   Smoking Status     Former Smoker     Quit date: 1/1/1984   Smokeless Tobacco     Never Used     Comment: smoke free household.         Hypertension   Last three blood pressure readings:  BP Readings from Last 3 Encounters:   10/09/17 129/80   09/27/17 121/78   05/15/17 138/80     Blood pressure: Passed    HTN Guidelines:  Age 18-59 BP range:  Less than 140/90  Age 60-85 with Diabetes:  Less than 140/90  Age 60-85 without Diabetes:  less than 150/90      Composite cancer screening  Chart review shows that this patient is due/due soon for the following None  Summary:    Patient is due/failing the following:   A1C    Action needed:   None needed-patient has an appointment on 5-7-2018 with Johanny Villar MD    Type of outreach:    none needed    Questions for provider review:    None                                                                                                                                     Veronika West LPN       Chart routed to closed .

## 2018-03-15 DIAGNOSIS — E03.9 HYPOTHYROIDISM, UNSPECIFIED TYPE: ICD-10-CM

## 2018-03-15 DIAGNOSIS — I10 ESSENTIAL HYPERTENSION WITH GOAL BLOOD PRESSURE LESS THAN 140/90: ICD-10-CM

## 2018-03-15 RX ORDER — LEVOTHYROXINE SODIUM 150 UG/1
150 TABLET ORAL DAILY
Qty: 90 TABLET | Refills: 1 | Status: SHIPPED | OUTPATIENT
Start: 2018-03-15 | End: 2018-08-05

## 2018-03-15 RX ORDER — LISINOPRIL 10 MG/1
10 TABLET ORAL DAILY
Qty: 90 TABLET | Refills: 1 | Status: SHIPPED | OUTPATIENT
Start: 2018-03-15 | End: 2018-08-05

## 2018-04-09 DIAGNOSIS — E78.5 HYPERLIPIDEMIA LDL GOAL <70: ICD-10-CM

## 2018-04-09 RX ORDER — PRAVASTATIN SODIUM 40 MG
TABLET ORAL
Qty: 90 TABLET | Refills: 1 | Status: SHIPPED | OUTPATIENT
Start: 2018-04-09 | End: 2018-09-12

## 2018-04-10 ENCOUNTER — TRANSFERRED RECORDS (OUTPATIENT)
Dept: HEALTH INFORMATION MANAGEMENT | Facility: CLINIC | Age: 63
End: 2018-04-10

## 2018-04-11 ENCOUNTER — TELEPHONE (OUTPATIENT)
Dept: FAMILY MEDICINE | Facility: CLINIC | Age: 63
End: 2018-04-11

## 2018-04-11 NOTE — TELEPHONE ENCOUNTER
Please abstract the following data from this visit with this patient into the appropriate field in Epic:    Eye exam with ophthalmology on this date: 4/10/18

## 2018-04-23 ENCOUNTER — TRANSFERRED RECORDS (OUTPATIENT)
Dept: HEALTH INFORMATION MANAGEMENT | Facility: CLINIC | Age: 63
End: 2018-04-23

## 2018-05-07 ENCOUNTER — OFFICE VISIT (OUTPATIENT)
Dept: ENDOCRINOLOGY | Facility: CLINIC | Age: 63
End: 2018-05-07
Payer: COMMERCIAL

## 2018-05-07 ENCOUNTER — OFFICE VISIT (OUTPATIENT)
Dept: NURSING | Facility: CLINIC | Age: 63
End: 2018-05-07
Payer: COMMERCIAL

## 2018-05-07 VITALS
WEIGHT: 199.52 LBS | BODY MASS INDEX: 36.72 KG/M2 | HEIGHT: 62 IN | OXYGEN SATURATION: 98 % | SYSTOLIC BLOOD PRESSURE: 126 MMHG | DIASTOLIC BLOOD PRESSURE: 83 MMHG | HEART RATE: 94 BPM

## 2018-05-07 DIAGNOSIS — E11.9 DIABETES MELLITUS WITHOUT COMPLICATION (H): Primary | ICD-10-CM

## 2018-05-07 LAB — HBA1C MFR BLD: 10.4 % (ref 0–5.7)

## 2018-05-07 PROCEDURE — G0108 DIAB MANAGE TRN  PER INDIV: HCPCS

## 2018-05-07 PROCEDURE — 99215 OFFICE O/P EST HI 40 MIN: CPT | Performed by: INTERNAL MEDICINE

## 2018-05-07 PROCEDURE — 83036 HEMOGLOBIN GLYCOSYLATED A1C: CPT | Performed by: INTERNAL MEDICINE

## 2018-05-07 PROCEDURE — 36415 COLL VENOUS BLD VENIPUNCTURE: CPT | Performed by: INTERNAL MEDICINE

## 2018-05-07 NOTE — PATIENT INSTRUCTIONS
No follow up with Endocrine unless you decide to start insulin.    Thank you for choosing the [facility], Department of Endocrinology. It has been a pleasure servicing you today. Please contact us at [phone] with any questions. If you need to speak to an Endocrinologist and it is after 5:00 pm or on a weekend, please call the On-Call Endocrinologist at [phone].

## 2018-05-07 NOTE — NURSING NOTE
Kenna Colindres's goals for this visit include: Follow up Diabetes  She requests these members of her care team be copied on today's visit information: YES    PCP: Bienvenido Colvin    Referring Provider:  No referring provider defined for this encounter.    @[facility]@    Do you need any medication refills at today's visit? YES

## 2018-05-07 NOTE — MR AVS SNAPSHOT
After Visit Summary   5/7/2018    Kenna Colindres    MRN: [id number]           Patient Information     Date Of Birth          1955        Visit Information        Provider Department      5/7/2018 1:30 PM Johanny Nicole MD [facility]        Today's Diagnoses     Uncontrolled type 2 diabetes mellitus without complication, without long-term current use of insulin (H)    -  1      Care Instructions    No follow up with Endocrine unless you decide to start insulin.    Thank you for choosing the [facility] Department of Endocrinology. It has been a pleasure servicing you today. Please contact us at [phone] with any questions. If you need to speak to an Endocrinologist and it is after 5:00 pm or on a weekend, please call the On-Call Endocrinologist at [phone].            Follow-ups after your visit        Future tests that were ordered for you today     Open Standing Orders        Priority Remaining Interval Expires Ordered    Hemoglobin A1c POCT Routine 3/4 Q 3 MO 5/7/2019 5/7/2018            Who to contact     If you have questions or need follow up information about today's clinic visit or your schedule please contact [facility] directly at [phone].  Normal or non-critical lab and imaging results will be communicated to you by MyChart, letter or phone within 4 business days after the clinic has received the results. If you do not hear from us within 7 days, please contact the clinic through Pro[employer]a Systemshart or phone. If you have a critical or abnormal lab result, we will notify you by phone as soon as possible.  Submit refill requests through Kicksend or call your pharmacy and they will forward the refill request to us. Please allow 3 business days for your refill to be completed.          Additional Information About Your Visit        [employer] technology Information     Kicksend gives you secure access to your electronic health record. If you see a  "primary care provider, you can also send messages to your care team and make appointments. If you have questions, please call your primary care clinic.  If you do not have a primary care provider, please call [phone] and they will assist you.      Payment plugin is an electronic gateway that provides easy, online access to your medical records. With Payment plugin, you can request a clinic appointment, read your test results, renew a prescription or communicate with your care team.     To access your existing account, please contact your [facility] or call [phone] for assistance.        Care EveryWhere ID     This is your Care EveryWhere ID. This could be used by other organizations to access your [place] medical records  [id number]        Your Vitals Were     Pulse Height Pulse Oximetry BMI (Body Mass Index)          94 1.575 m (5' 2\") 98% 36.49 kg/m2         Blood Pressure from Last 3 Encounters:   05/07/18 126/83   10/09/17 129/80   09/27/17 121/78    Weight from Last 3 Encounters:   05/07/18 90.5 kg (199 lb 8.3 oz)   10/09/17 88.9 kg (196 lb)   09/27/17 88 kg (194 lb)              We Performed the Following     Hemoglobin A1c POCT        Primary Care Provider Office Phone # Fax #    Bienvenido Colvin [phone] [address] [facility] [address]        Equal Access to Services     [facility] : Hadii aad ku hadasho Soomaali, waaxda luqadaha, qaybta kaalmada adeegyada, velvet schmidt . So [facility] [phone].    ATENCIÓN: Si habla español, tiene a pérez disposición servicios gratuitos de asistencia lingüística. Llame al [phone].    We comply with applicable federal civil rights laws and [place] laws. We do not discriminate on the basis of race, color, national origin, age, disability, sex, sexual orientation, or gender identity.            Thank you!     Thank you for choosing [facility] [[email or web address]]  for your care. Our goal " is always to provide you with excellent care. Hearing back from our patients is one way we can continue to improve our services. Please take a few minutes to complete the written survey that you may receive in the mail after your visit with us. Thank you!             Your Updated Medication List - Protect others around you: Learn how to safely use, store and throw away your medicines at www.disposemymeds.org.          This list is accurate as of 5/7/18  2:11 PM.  Always use your most recent med list.                   Brand Name Dispense Instructions for use Diagnosis    albuterol 108 (90 Base) MCG/ACT Inhaler    PROAIR HFA/PROVENTIL HFA/VENTOLIN HFA    3 Inhaler    Inhale 2 puffs into the lungs every 6 hours as needed for shortness of breath / dyspnea or wheezing    Bronchospasm       ASPIRIN LOW DOSE 81 MG EC tablet   Generic drug:  aspirin     90 tablet    TAKE 1 TABLET DAILY    Uncontrolled diabetes mellitus type 2 without complications (H)       empagliflozin 25 MG Tabs tablet    JARDIANCE    90 tablet    Take 1 tablet (25 mg) by mouth daily    Uncontrolled type 2 diabetes mellitus without complication, without long-term current use of insulin (H)       gabapentin 300 MG capsule    NEURONTIN    270 capsule    Take 1 capsule (300 mg) by mouth 3 times daily    Fibromyalgia syndrome       levothyroxine 150 MCG tablet    LEVOTHROID    90 tablet    Take 1 tablet (150 mcg) by mouth daily    Hypothyroidism, unspecified type       lisinopril 10 MG tablet    PRINIVIL/ZESTRIL    90 tablet    Take 1 tablet (10 mg) by mouth daily    Essential hypertension with goal blood pressure less than 140/90       metFORMIN 500 MG 24 hr tablet    GLUCOPHAGE-XR    360 tablet    Take 2 tablets (1,000 mg) by mouth 2 times daily (with meals)    Uncontrolled diabetes mellitus type 2 without complications (H)       MULTIVITAMINS PO      Take by mouth daily        omeprazole 20 MG tablet    priLOSEC OTC     Take 20 mg by mouth daily     Hiatal hernia       pramipexole 0.5 MG tablet    MIRAPEX    90 tablet    Take 1 tablet (0.5 mg) by mouth At Bedtime    Restless legs syndrome       pravastatin 40 MG tablet    PRAVACHOL    90 tablet    TAKE 1 TABLET BY MOUTH  DAILY    Hyperlipidemia LDL goal <70       sitagliptin 100 MG tablet    JANUVIA    90 tablet    Take 1 tablet (100 mg) by mouth daily    Uncontrolled diabetes mellitus type 2 without complications (H)       tocilizumab 162 MG/0.9ML subcutaneous injection    ACTEMRA     Every week SQ

## 2018-05-07 NOTE — MR AVS SNAPSHOT
After Visit Summary   5/7/2018    Kenna Colindres    MRN: [id number]           Patient Information     Date Of Birth          1955        Visit Information        Provider Department      5/7/2018 2:00 PM Provider, [facility]        Today's Diagnoses     Diabetes mellitus without complication (H)    -  1       Follow-ups after your visit        Who to contact     If you have questions or need follow up information about today's clinic visit or your schedule please contact [facility] directly at [phone].  Normal or non-critical lab and imaging results will be communicated to you by Boraccihart, letter or phone within 4 business days after the clinic has received the results. If you do not hear from us within 7 days, please contact the clinic through [employer] sentronicst or phone. If you have a critical or abnormal lab result, we will notify you by phone as soon as possible.  Submit refill requests through PlayM[employer] CRM or call your pharmacy and they will forward the refill request to us. Please allow 3 business days for your refill to be completed.          Additional Information About Your Visit        MyChart Information     [employer] CRM gives you secure access to your electronic health record. If you see a primary care provider, you can also send messages to your care team and make appointments. If you have questions, please call your primary care clinic.  If you do not have a primary care provider, please call [phone] and they will assist you.      [employer] CRM is an electronic gateway that provides easy, online access to your medical records. With [employer] CRM, you can request a clinic appointment, read your test results, renew a prescription or communicate with your care team.     To access your existing account, please contact your [facility] or call [phone] for assistance.        Care EveryWhere ID     This is your Care EveryWhere ID. This  could be used by other organizations to access your [place] medical records  [id number]         Blood Pressure from Last 3 Encounters:   05/07/18 126/83   10/09/17 129/80   09/27/17 121/78    Weight from Last 3 Encounters:   05/07/18 90.5 kg (199 lb 8.3 oz)   10/09/17 88.9 kg (196 lb)   09/27/17 88 kg (194 lb)              We Performed the Following     DIABETES EDUCATION - Individual  []        Primary Care Provider Office Phone # Fax #    Bienvenido Colvin [phone] [address] [facility] [address]        Equal Access to Services     YASH HECTOR : Hadii hannah gaitano Sobridger, waaxda luqadaha, qaybta kaalmada adekayleenda, velvet rodriguez. So [facility] [phone].    ATENCIÓN: Si habla español, tiene a pérez disposición servicios gratuitos de asistencia lingüística. Llame al [phone].    We comply with applicable federal civil rights laws and [place] laws. We do not discriminate on the basis of race, color, national origin, age, disability, sex, sexual orientation, or gender identity.            Thank you!     Thank you for choosing [facility]  for your care. Our goal is always to provide you with excellent care. Hearing back from our patients is one way we can continue to improve our services. Please take a few minutes to complete the written survey that you may receive in the mail after your visit with us. Thank you!             Your Updated Medication List - Protect others around you: Learn how to safely use, store and throw away your medicines at www.disposemymeds.org.          This list is accurate as of 5/7/18 11:59 PM.  Always use your most recent med list.                   Brand Name Dispense Instructions for use Diagnosis    albuterol 108 (90 Base) MCG/ACT Inhaler    PROAIR HFA/PROVENTIL HFA/VENTOLIN HFA    3 Inhaler    Inhale 2 puffs into the lungs every 6 hours as needed for shortness of breath / dyspnea or wheezing     Bronchospasm       ASPIRIN LOW DOSE 81 MG EC tablet   Generic drug:  aspirin     90 tablet    TAKE 1 TABLET DAILY    Uncontrolled diabetes mellitus type 2 without complications (H)       empagliflozin 25 MG Tabs tablet    JARDIANCE    90 tablet    Take 1 tablet (25 mg) by mouth daily    Uncontrolled type 2 diabetes mellitus without complication, without long-term current use of insulin (H)       gabapentin 300 MG capsule    NEURONTIN    270 capsule    Take 1 capsule (300 mg) by mouth 3 times daily    Fibromyalgia syndrome       levothyroxine 150 MCG tablet    LEVOTHROID    90 tablet    Take 1 tablet (150 mcg) by mouth daily    Hypothyroidism, unspecified type       lisinopril 10 MG tablet    PRINIVIL/ZESTRIL    90 tablet    Take 1 tablet (10 mg) by mouth daily    Essential hypertension with goal blood pressure less than 140/90       metFORMIN 500 MG 24 hr tablet    GLUCOPHAGE-XR    360 tablet    Take 2 tablets (1,000 mg) by mouth 2 times daily (with meals)    Uncontrolled diabetes mellitus type 2 without complications (H)       MULTIVITAMINS PO      Take by mouth daily        omeprazole 20 MG tablet    priLOSEC OTC     Take 20 mg by mouth daily    Hiatal hernia       pramipexole 0.5 MG tablet    MIRAPEX    90 tablet    Take 1 tablet (0.5 mg) by mouth At Bedtime    Restless legs syndrome       pravastatin 40 MG tablet    PRAVACHOL    90 tablet    TAKE 1 TABLET BY MOUTH  DAILY    Hyperlipidemia LDL goal <70       sitagliptin 100 MG tablet    JANUVIA    90 tablet    Take 1 tablet (100 mg) by mouth daily    Uncontrolled diabetes mellitus type 2 without complications (H)       tocilizumab 162 MG/0.9ML subcutaneous injection    ACTEMRA     Every week SQ

## 2018-05-07 NOTE — LETTER
"    5/7/2018         RE: Kenna Colindres  [address] [place] [id number]        Dear Colleague,    Thank you for referring your patient, Kenna Colindres, to the [facility]. Please see a copy of my visit note below.    REASON FOR CONSULTATION:  Uncontrolled type 2 diabetes.      HISTORY OF PRESENT ILLNESS:  Kenna Colindres is a 63-year-old female who returns for f/u of T2DM. She was last seen in our clinic > 2 years ago.   The patient presents today along with her .     She reports that she was diagnosed with type 2 diabetes in her late 40s.  She is currently taking metformin 1000 mg twice daily, Januvia 100 mg daily and Jardiance 25 mg daily.  Denies missing diabetes medications.  Does not check FSBG at home.    She denies any known complications from diabetes.    She is concerned that she is taking too many medications and would like to undergo weight loss surgery. She underwent gastric banding about 15 years ago but feels that now she is always hungry and has a large appetite.   She thinks that current medications are not helping and 'stopping all diabetes medications' may help with improving her diabetes.   \"i don't feel any symptoms so why would high sugar be bad for me\"     She also does not like to use needles and therefore does not check  fingerstick blood glucose and also in general not interested in injectable medications for diabetes.  She does receive Actemra injection which her  gives her weekly for rheumatoid arthritis.     She denies any history of hypoglycemia.      She denies any acute complaints.  No tingling or numbness in feet or toes.    REVIEW OF SYSTEMS:       A 10-point review of systems was done.  Pertinent positives and negatives noted in HPI.  Review of systems otherwise negative.      PAST MEDICAL HISTORY:  Rheumatoid arthritis, hypothyroidism, fibromyalgia and obesity, status post laparoscopic band surgery, type 2 diabetes.        FAMILY HISTORY:  " "Reviewed and noncontributory.     Family History   Problem Relation Age of Onset     Asthma Daughter      SOCIAL HISTORY:  She lives in [place] with her .  They both enjoy riding [employer] and reports participating in parades and sofi events. She drinks about 1 alcoholic drink a week.        Current Outpatient Prescriptions on File Prior to Visit:  albuterol (PROAIR HFA, PROVENTIL HFA, VENTOLIN HFA) 108 (90 BASE) MCG/ACT inhaler Inhale 2 puffs into the lungs every 6 hours as needed for shortness of breath / dyspnea or wheezing   ASPIRIN LOW DOSE 81 MG EC tablet TAKE 1 TABLET DAILY   empagliflozin (JARDIANCE) 25 MG TABS tablet Take 1 tablet (25 mg) by mouth daily   gabapentin (NEURONTIN) 300 MG capsule Take 1 capsule (300 mg) by mouth 3 times daily   levothyroxine (LEVOTHROID) 150 MCG tablet Take 1 tablet (150 mcg) by mouth daily   lisinopril (PRINIVIL/ZESTRIL) 10 MG tablet Take 1 tablet (10 mg) by mouth daily   metFORMIN (GLUCOPHAGE-XR) 500 MG 24 hr tablet Take 2 tablets (1,000 mg) by mouth 2 times daily (with meals)   Multiple Vitamin (MULTIVITAMINS PO) Take by mouth daily   omeprazole (PRILOSEC OTC) 20 MG tablet Take 20 mg by mouth daily   pramipexole (MIRAPEX) 0.5 MG tablet Take 1 tablet (0.5 mg) by mouth At Bedtime   pravastatin (PRAVACHOL) 40 MG tablet TAKE 1 TABLET BY MOUTH  DAILY   sitagliptin (JANUVIA) 100 MG tablet Take 1 tablet (100 mg) by mouth daily   tocilizumab (ACTEMRA) 162 MG/0.9ML subcutaneous injection Every week SQ     No current facility-administered medications on file prior to visit.      PHYSICAL EXAMINATION:   /83  Pulse 94  Ht 1.575 m (5' 2\")  Wt 90.5 kg (199 lb 8.3 oz)  SpO2 98%  BMI 36.49 kg/m2  GENERAL:  She appears in no acute distress.   NEUROLOGIC:  Alert and oriented      RESULTS    Lab Results   Component Value Date    A1C 10.4 05/07/2018    A1C 10.3 (H) 09/27/2017    A1C 9.8 (H) 03/29/2017    A1C 8.6 (H) 09/26/2016    A1C 8.8 (H) 04/13/2016       TSH "   Date Value Ref Range Status   09/27/2017 2.46 0.40 - 4.00 mU/L Final   09/26/2016 3.38 0.40 - 4.00 mU/L Final   04/13/2016 4.56 (H) 0.40 - 4.00 mU/L Final   06/18/2015 2.97 0.40 - 4.00 mU/L Final   04/09/2015 16.03 (H) 0.40 - 4.00 mU/L Final     T4 Free   Date Value Ref Range Status   04/13/2016 1.13 0.76 - 1.46 ng/dL Final   04/09/2015 0.71 (L) 0.76 - 1.46 ng/dL Final       Creatinine   Date Value Ref Range Status   09/27/2017 0.69 0.52 - 1.04 mg/dL Final   03/27/2017 0.63 0.52 - 1.04 mg/dL Final       Potassium   Date Value Ref Range Status   09/27/2017 4.4 3.4 - 5.3 mmol/L Final   03/27/2017 4.3 3.4 - 5.3 mmol/L Final       No results found for: MICROALBUMIN    ALT   Date Value Ref Range Status   03/27/2017 75 (H) 0 - 50 U/L Final   09/23/2015 61 U/L Final       Recent Labs   Lab Test  07/05/14   0933  11/09/13   0959  08/14/13   1014   CHOL  189   --   172   HDL  41*   --   44*   LDL  126  101  100   TRIG  106   --   139   CHOLHDLRATIO  4.6   --   3.9          ASSESSMENT AND RECOMMENDATIONS:     1.  Uncontrolled type 2 diabetes. A1c has been around or above 10% over the last one year.   Patient has poor insight into the risks associated with diabetes and treatment strategies to improve diabetes control.  We discussed the various oral antidiabetic medications.  We also reviewed use of once a week, GLP-1 analogue and different types of insulin regimens.  She is already on three oral hypoglycemics with A1c above 10%.  I suggest a trial of starting  once a day basal insulin.  Patient is very resistant to any change in her current diabetes medications.  She feels that weight loss surgery is the most feasible solution for long-term diabetes control for her.    She is willing to meet with the diabetes educator today to learn about insulin pen and to see if she would be willing to start basal insulin.  Her  is willing to give her once a day injection.    She was also counseled about importance of fingerstick  blood glucose monitoring.    2.  Obesity:  She would like to be evaluated for weight loss surgery.     3.  Hypothyroidism.  Last TSH was in target range on the current dose of levothyroxine.     4.  Dyslipidemia.  She is on a statin.     5.  She will return to clinic in 3-4 months.     At this time patient does not want to schedule a follow-up appointment in our clinic.      SHINE NICOLE MD       I spent 40 minutes with this patient face to face and explained the conditions and plans (more than 50% of time was counseling/coordination of care, diabetes management, low bone density) . The patient understood and is satisfied with today's visit.     Note: Chart documentation done in part with Dragon Voice Recognition software. Although reviewed after completion, some word and grammatical errors may remain. Please consider this when interpreting information in this chart      Again, thank you for allowing me to participate in the care of your patient.        Sincerely,        Shine Nicole MD

## 2018-05-07 NOTE — PROGRESS NOTES
"REASON FOR CONSULTATION:  Uncontrolled type 2 diabetes.      HISTORY OF PRESENT ILLNESS:  Liz Colindres is a 63-year-old female who returns for f/u of T2DM. She was last seen in our clinic > 2 years ago.   The patient presents today along with her .     She reports that she was diagnosed with type 2 diabetes in her late 40s.  She is currently taking metformin 1000 mg twice daily, Januvia 100 mg daily and Jardiance 25 mg daily.  Denies missing diabetes medications.  Does not check FSBG at home.    She denies any known complications from diabetes.    She is concerned that she is taking too many medications and would like to undergo weight loss surgery. She underwent gastric banding about 15 years ago but feels that now she is always hungry and has a large appetite.   She thinks that current medications are not helping and 'stopping all diabetes medications' may help with improving her diabetes.   \"i don't feel any symptoms so why would high sugar be bad for me\"     She also does not like to use needles and therefore does not check  fingerstick blood glucose and also in general not interested in injectable medications for diabetes.  She does receive Actemra injection which her  gives her weekly for rheumatoid arthritis.     She denies any history of hypoglycemia.      She denies any acute complaints.  No tingling or numbness in feet or toes.    REVIEW OF SYSTEMS:       A 10-point review of systems was done.  Pertinent positives and negatives noted in HPI.  Review of systems otherwise negative.      PAST MEDICAL HISTORY:  Rheumatoid arthritis, hypothyroidism, fibromyalgia and obesity, status post laparoscopic band surgery, type 2 diabetes.        FAMILY HISTORY:  Reviewed and noncontributory.     Family History   Problem Relation Age of Onset     Asthma Daughter      SOCIAL HISTORY:  She lives in [place] with her .  They both enjoy riding Ali and reports participating in parades and " "sofi events. She drinks about 1 alcoholic drink a week.        Current Outpatient Prescriptions on File Prior to Visit:  albuterol (PROAIR HFA, PROVENTIL HFA, VENTOLIN HFA) 108 (90 BASE) MCG/ACT inhaler Inhale 2 puffs into the lungs every 6 hours as needed for shortness of breath / dyspnea or wheezing   ASPIRIN LOW DOSE 81 MG EC tablet TAKE 1 TABLET DAILY   empagliflozin (JARDIANCE) 25 MG TABS tablet Take 1 tablet (25 mg) by mouth daily   gabapentin (NEURONTIN) 300 MG capsule Take 1 capsule (300 mg) by mouth 3 times daily   levothyroxine (LEVOTHROID) 150 MCG tablet Take 1 tablet (150 mcg) by mouth daily   lisinopril (PRINIVIL/ZESTRIL) 10 MG tablet Take 1 tablet (10 mg) by mouth daily   metFORMIN (GLUCOPHAGE-XR) 500 MG 24 hr tablet Take 2 tablets (1,000 mg) by mouth 2 times daily (with meals)   Multiple Vitamin (MULTIVITAMINS PO) Take by mouth daily   omeprazole (PRILOSEC OTC) 20 MG tablet Take 20 mg by mouth daily   pramipexole (MIRAPEX) 0.5 MG tablet Take 1 tablet (0.5 mg) by mouth At Bedtime   pravastatin (PRAVACHOL) 40 MG tablet TAKE 1 TABLET BY MOUTH  DAILY   sitagliptin (JANUVIA) 100 MG tablet Take 1 tablet (100 mg) by mouth daily   tocilizumab (ACTEMRA) 162 MG/0.9ML subcutaneous injection Every week SQ     No current facility-administered medications on file prior to visit.      PHYSICAL EXAMINATION:   /83  Pulse 94  Ht 1.575 m (5' 2\")  Wt 90.5 kg (199 lb 8.3 oz)  SpO2 98%  BMI 36.49 kg/m2  GENERAL:  She appears in no acute distress.   NEUROLOGIC:  Alert and oriented      RESULTS    Lab Results   Component Value Date    A1C 10.4 05/07/2018    A1C 10.3 (H) 09/27/2017    A1C 9.8 (H) 03/29/2017    A1C 8.6 (H) 09/26/2016    A1C 8.8 (H) 04/13/2016       TSH   Date Value Ref Range Status   09/27/2017 2.46 0.40 - 4.00 mU/L Final   09/26/2016 3.38 0.40 - 4.00 mU/L Final   04/13/2016 4.56 (H) 0.40 - 4.00 mU/L Final   06/18/2015 2.97 0.40 - 4.00 mU/L Final   04/09/2015 16.03 (H) 0.40 - 4.00 mU/L Final "     T4 Free   Date Value Ref Range Status   04/13/2016 1.13 0.76 - 1.46 ng/dL Final   04/09/2015 0.71 (L) 0.76 - 1.46 ng/dL Final       Creatinine   Date Value Ref Range Status   09/27/2017 0.69 0.52 - 1.04 mg/dL Final   03/27/2017 0.63 0.52 - 1.04 mg/dL Final       Potassium   Date Value Ref Range Status   09/27/2017 4.4 3.4 - 5.3 mmol/L Final   03/27/2017 4.3 3.4 - 5.3 mmol/L Final       No results found for: MICROALBUMIN    ALT   Date Value Ref Range Status   03/27/2017 75 (H) 0 - 50 U/L Final   09/23/2015 61 U/L Final       Recent Labs   Lab Test  07/05/14   0933  11/09/13   0959  08/14/13   1014   CHOL  189   --   172   HDL  41*   --   44*   LDL  126  101  100   TRIG  106   --   139   CHOLHDLRATIO  4.6   --   3.9          ASSESSMENT AND RECOMMENDATIONS:     1.  Uncontrolled type 2 diabetes. A1c has been around or above 10% over the last one year.   Patient has poor insight into the risks associated with diabetes and treatment strategies to improve diabetes control.  We discussed the various oral antidiabetic medications.  We also reviewed use of once a week, GLP-1 analogue and different types of insulin regimens.  She is already on three oral hypoglycemics with A1c above 10%.  I suggest a trial of starting  once a day basal insulin.  Patient is very resistant to any change in her current diabetes medications.  She feels that weight loss surgery is the most feasible solution for long-term diabetes control for her.    She is willing to meet with the diabetes educator today to learn about insulin pen and to see if she would be willing to start basal insulin.  Her  is willing to give her once a day injection.    She was also counseled about importance of fingerstick blood glucose monitoring.    2.  Obesity:  She would like to be evaluated for weight loss surgery.     3.  Hypothyroidism.  Last TSH was in target range on the current dose of levothyroxine.     4.  Dyslipidemia.  She is on a statin.     5.  She  will return to clinic in 3-4 months.     At this time patient does not want to schedule a follow-up appointment in our clinic.      SHINE FULLER MD       I spent 40 minutes with this patient face to face and explained the conditions and plans (more than 50% of time was counseling/coordination of care, diabetes management, low bone density) . The patient understood and is satisfied with today's visit.     Note: Chart documentation done in part with Dragon Voice Recognition software. Although reviewed after completion, some word and grammatical errors may remain. Please consider this when interpreting information in this chart

## 2018-05-08 NOTE — LETTER
May 17, 2018    Kenna Colindres  [address] [place] [id number]    Dear Kenna,     We have tried to reach you by phone with no return call. Here is a message from Dr Colvin.                 1. I see that you visited with the endocrinologist and declined their recommendations.                           2. I am not able to treat you any longer since you are not participating in your own health care.                           3. If you change you mind and want to get serious about your health, come in to see me.     Bienvenido Colvin M.D.

## 2018-05-10 RX ORDER — METFORMIN HCL 500 MG
1000 TABLET, EXTENDED RELEASE 24 HR ORAL 2 TIMES DAILY WITH MEALS
Qty: 360 TABLET | Refills: 0 | OUTPATIENT
Start: 2018-05-10

## 2018-05-10 NOTE — TELEPHONE ENCOUNTER
Patient last visit with you for diabetes 9/27/17. She saw Endo on 5/7/18. Do you need to see her again for Diabetes for refill of Metformin? Catherine Bergeron RN

## 2018-05-10 NOTE — TELEPHONE ENCOUNTER
Please call patient:     1. I see that you visited with the endocrinologist and declined their recommendations.     2. I am not able to treat you any longer since you are not participating in your own health care.     3. If you change you mind and want to get serious about your health, come in to see me.    Bienvenido Colvin M.D.

## 2018-05-16 NOTE — TELEPHONE ENCOUNTER
Left message on answering machine for patient to call back to [phone].  Randi Easley RN

## 2018-05-17 NOTE — TELEPHONE ENCOUNTER
Pt notified of provider message as written. Pt states she is still using her diabetic medications.  She does not like needles so does not want to use insulin now.  She did meet with the nutritionist and is working on weight loss, diet and exercise.  She has lost 115 lb's. Pt states her gastric band had broken and she would like to get a gastric sleeve.  She states Dr. Nicole was ok with this plan.  Pt states she has attended a bariatric seminar.  The bariatric clinic needed more health hx and she has supplied that to them.  Pt would like to continue to work on diet, exercise and weight loss to see if she can avoid insulin.  To provider as DEVI.  Randi COELLON, RN

## 2018-05-23 NOTE — PROGRESS NOTES
"  Diabetes Self Management Training: Individual Review Visit    Kenna Colindres presents today for education related to Type 2 diabetes.    She is accompanied by spouse    Patient's diabetes management related comments/concerns: none    Patient's emotional response to diabetes: denial and bargaining    Patient would like this visit to be focused around the following diabetes-related behaviors and goals: none    ASSESSMENT:  Patient was seen by Dr Nicole earlier in the day. Her last visit with our clinic was 2 years ago. States she was diagnosed with type 2 diabetes in her late 40's. Pt with a Hx of non-compliance with bg finger sticks; \"hates needles\". Underwent gastric by-pass surgery 15 years ago. Has gained back a significant amount of weight. Considering doing the surgery again. States surgery will help her \"get rid of her diabetes\".      Dr Nicole requests I meet with the pt to review bg meter use, finger sticks and teach insulin adminiistation.     Current Diabetes Management per Patient:  Taking diabetes medications? Yes: see below.      Diabetes Medication(s)     Biguanides Sig    metFORMIN (GLUCOPHAGE-XR) 500 MG 24 hr tablet Take 2 tablets (1,000 mg) by mouth 2 times daily (with meals)    Dipeptidyl Peptidase-4 (DPP-4) Inhibitors Sig    sitagliptin (JANUVIA) 100 MG tablet Take 1 tablet (100 mg) by mouth daily    Sodium-Glucose Co-Transporter 2 (SGLT2) Inhibitors Sig    empagliflozin (JARDIANCE) 25 MG TABS tablet Take 1 tablet (25 mg) by mouth daily         Patient glucose self monitoring as follows: never.     BG values are: Not in goal  Patient's most recent   Lab Results   Component Value Date    A1C 10.4 05/07/2018    is not meeting goal of < 6.5%     Of note: patient does not believe elevated bg readings will cause her to have health problems in the future. States: \" I don\"t feel bad so how can that hurt me\"?     Vitals:  There were no vitals taken for this visit.  Estimated body mass index is 36.49 " "kg/(m^2) as calculated from the following:    Height as of an earlier encounter on 5/7/18: 1.575 m (5' 2\").    Weight as of an earlier encounter on 5/7/18: 90.5 kg (199 lb 8.3 oz).   Last 3 BP:   BP Readings from Last 3 Encounters:   05/07/18 126/83   10/09/17 129/80   09/27/17 121/78       History   Smoking Status     Former Smoker     Quit date: 1/1/1984   Smokeless Tobacco     Never Used     Comment: smoke free household.       Labs:  Lab Results   Component Value Date    A1C 10.4 05/07/2018     Lab Results   Component Value Date     09/27/2017     Lab Results   Component Value Date     09/27/2017     HDL Cholesterol   Date Value Ref Range Status   09/27/2017 43 (L) >49 mg/dL Final   ]  GFR Estimate   Date Value Ref Range Status   09/27/2017 86 >60 mL/min/1.7m2 Final     Comment:     Non  GFR Calc     GFR Estimate If Black   Date Value Ref Range Status   09/27/2017 >90 >60 mL/min/1.7m2 Final     Comment:      GFR Calc     Lab Results   Component Value Date    CR 0.69 09/27/2017     No results found for: MICROALBUMIN    Socio/Economic Considerations:    Support system: spouse/significant other. He agress with pt regaridn her feelgn about diabets and its mgmt.     Health Beliefs and Attitudes:   Patient Activation Measure Survey Score:  JAYLA Score (Last Two) 8/17/2012   JAYLA Raw Score 52   Activation Score 100   JAYLA Level 4     Stage of Change: PRECONTEMPLATION (Not seeing need for change).    Diabetes knowledge and skills assessment:     Patient is knowledgeable in diabetes management concepts related to: none    Patient needs further education on the following diabetes management concepts: all    Barriers to Learning Assessment: No Barriers identified, except pt's lack of insight of complications that can develop from years of poor diabetes control.     Based on learning assessment above, most appropriate setting for further diabetes education would be: Individual " "setting.    INTERVENTION:   Education provided today on:  Being Active: relationship to blood glucose  Monitoring: proper technique  Taking Medication: drawing up, administering and storing injectable diabetes medications    Of note: during our visit, the patient and her spouse routinely interruppted me stating they saw no reason why pt needed to \"learn this stuff\". Pt repeatedly stated she would not take insulin due to hating needles.      Education Materials Provided:  No new materials provided today    PLAN:  Being Active:Encourged pt to walk most day of the week for 15-20 minutes at a time  Monitoring: Gave pt a One Touch Verio Bg meer. Meter programmed. Shown how to use meter and do finger sticks.   Taking Medication: Reviewed insulin adminstration technique.      FOLLOW-UP:    Ongoing plan for education and support: [facility]     Time Spent: 30 minutes  Encounter Type: Individual    Kenna Colindres comes into clinic today at the request of Dr Nicole, Ordering Provider for Pt Teaching on insulin administration.     This service provided today was under the supervising provider of the day Dr Nicole, who was available if needed.    Mona Alves, RN, BSN, CDE   [facility]  "

## 2018-06-04 ENCOUNTER — TELEPHONE (OUTPATIENT)
Dept: FAMILY MEDICINE | Facility: CLINIC | Age: 63
End: 2018-06-04

## 2018-06-04 ENCOUNTER — TELEPHONE (OUTPATIENT)
Dept: ENDOCRINOLOGY | Facility: CLINIC | Age: 63
End: 2018-06-04

## 2018-06-04 NOTE — TELEPHONE ENCOUNTER
Panel Management Review      Patient has the following on her problem list:     Diabetes    ASA: Passed    Last A1C  Lab Results   Component Value Date    A1C 10.4 05/07/2018    A1C 10.3 09/27/2017    A1C 9.8 03/29/2017    A1C 8.6 09/26/2016    A1C 8.8 04/13/2016     A1C tested: FAILED    Last LDL:    Lab Results   Component Value Date    CHOL 185 09/27/2017     Lab Results   Component Value Date    HDL 43 09/27/2017     Lab Results   Component Value Date     09/27/2017     Lab Results   Component Value Date    TRIG 203 09/27/2017     Lab Results   Component Value Date    CHOLHDLRATIO 5.6 10/19/2015     Lab Results   Component Value Date    NHDL 142 09/27/2017       Is the patient on a Statin? YES             Is the patient on Aspirin? YES    Medications     HMG CoA Reductase Inhibitors    pravastatin (PRAVACHOL) 40 MG tablet    Salicylates    ASPIRIN LOW DOSE 81 MG EC tablet          Last three blood pressure readings:  BP Readings from Last 3 Encounters:   05/07/18 126/83   10/09/17 129/80   09/27/17 121/78       Date of last diabetes office visit: 05-     Tobacco History:     History   Smoking Status     Former Smoker     Quit date: 1/1/1984   Smokeless Tobacco     Never Used     Comment: smoke free household.         Hypertension   Last three blood pressure readings:  BP Readings from Last 3 Encounters:   05/07/18 126/83   10/09/17 129/80   09/27/17 121/78     Blood pressure: Passed    HTN Guidelines:  Age 18-59 BP range:  Less than 140/90  Age 60-85 with Diabetes:  Less than 140/90  Age 60-85 without Diabetes:  less than 150/90      Composite cancer screening  Chart review shows that this patient is due/due soon for the following None  Summary:    Patient is due/failing the following:   A1C    Action needed:   None needed-patient is following with Endo    Type of outreach:    none needed    Questions for provider review:    None                                                                                                                                     Veronika West LPN       Chart routed to closed .

## 2018-06-04 NOTE — TELEPHONE ENCOUNTER
Patient would like to continue care with Dr. Nicole at the [facility].     Number provided to patient for scheduling at the [facility].    Patient reports blood sugars have been elevated. Advised to schedule appt with RAYNA Seaman to review. Patient prefers telephone visit. Patient scheduled 6/6/18    Anika Archer LPN  Adult Endocrinology  [facility]

## 2018-06-04 NOTE — TELEPHONE ENCOUNTER
[facility] Call Center    Phone Message    May a detailed message be left on voicemail: yes    Reason for Call: Other: Patient is calling to schedule a follow up appointment with Dr. Nicole.  offered available times with other providers since Dr. Nicole is leaving early August and his schedule is full. Patient was persistent for  to contact clinic to see if there is a way she can see. Dr. Nicole before he leaves. Please advise     Action Taken: Message routed to:  Adult Clinics: Endocrinology p [phone]

## 2018-06-06 ENCOUNTER — APPOINTMENT (OUTPATIENT)
Dept: NURSING | Facility: CLINIC | Age: 63
End: 2018-06-06
Payer: COMMERCIAL

## 2018-06-06 RX ORDER — METFORMIN HCL 500 MG
1000 TABLET, EXTENDED RELEASE 24 HR ORAL 2 TIMES DAILY WITH MEALS
Qty: 360 TABLET | Refills: 1 | Status: SHIPPED | OUTPATIENT
Start: 2018-06-06 | End: 2018-09-26

## 2018-06-06 NOTE — TELEPHONE ENCOUNTER
"Spoke with pt for bg review. Pt met with Dr Nicole on 05/07/18. Pt states Bg readings are still running high - between 268-415. Pt asked if she was taking Metformin, Januvia and Jardiance as prescribed. Pt states she ran out of Metformin \"awhile ago\" an forgot to request a refill. Pt advised I will refill today. Advised to start taking metformin along with Januvia and Jardiance as soon as metformin arrives in mail. .   Pt also states she has appt with former Gastric Bypass surgeon next Tues. Wants to discuss if she have the surgery again. Remains very reluctant to start insulin. Pt advised she can discuss results of visit with surgeon with Dr Nicole at appt in July. Requested pt call clinic if bg readings remain elevated after she has been on metformin for a couple of weeks. Pt verbalized understanding.    Mona Alves, RN, BSN, CDE   [facility]   "

## 2018-06-14 ENCOUNTER — AMBULATORY - HEALTHEAST (OUTPATIENT)
Dept: LAB | Facility: CLINIC | Age: 63
End: 2018-06-14

## 2018-06-14 ENCOUNTER — OFFICE VISIT - HEALTHEAST (OUTPATIENT)
Dept: SURGERY | Facility: CLINIC | Age: 63
End: 2018-06-14

## 2018-06-14 ENCOUNTER — COMMUNICATION - HEALTHEAST (OUTPATIENT)
Dept: SURGERY | Facility: CLINIC | Age: 63
End: 2018-06-14

## 2018-06-14 DIAGNOSIS — E11.9 DIABETES MELLITUS (H): ICD-10-CM

## 2018-06-14 DIAGNOSIS — R74.8 ELEVATED LIVER ENZYMES: ICD-10-CM

## 2018-06-14 DIAGNOSIS — E03.9 HYPOTHYROIDISM, UNSPECIFIED TYPE: ICD-10-CM

## 2018-06-14 DIAGNOSIS — M06.9 RHEUMATOID ARTHRITIS (H): ICD-10-CM

## 2018-06-14 DIAGNOSIS — E66.01 MORBID OBESITY (H): ICD-10-CM

## 2018-06-14 DIAGNOSIS — E88.810 METABOLIC SYNDROME: ICD-10-CM

## 2018-06-14 DIAGNOSIS — N39.41 URGENCY INCONTINENCE: ICD-10-CM

## 2018-06-14 DIAGNOSIS — Z98.84 STATUS POST BARIATRIC SURGERY: ICD-10-CM

## 2018-06-14 DIAGNOSIS — I10 ESSENTIAL HYPERTENSION WITH GOAL BLOOD PRESSURE LESS THAN 140/90: ICD-10-CM

## 2018-06-14 DIAGNOSIS — I10 HYPERTENSION: ICD-10-CM

## 2018-06-14 DIAGNOSIS — K21.9 GERD (GASTROESOPHAGEAL REFLUX DISEASE): ICD-10-CM

## 2018-06-14 DIAGNOSIS — E07.9 DISEASE OF THYROID GLAND: ICD-10-CM

## 2018-06-14 DIAGNOSIS — G47.30 SLEEP APNEA: ICD-10-CM

## 2018-06-14 DIAGNOSIS — J45.20 MILD INTERMITTENT ASTHMA WITHOUT COMPLICATION: ICD-10-CM

## 2018-06-14 LAB
25(OH)D3 SERPL-MCNC: 30.9 NG/ML (ref 30–80)
ALBUMIN SERPL-MCNC: 4.1 G/DL (ref 3.5–5)
ALP SERPL-CCNC: 71 U/L (ref 45–120)
ALT SERPL W P-5'-P-CCNC: 105 U/L (ref 0–45)
ANION GAP SERPL CALCULATED.3IONS-SCNC: 11 MMOL/L (ref 5–18)
AST SERPL W P-5'-P-CCNC: 75 U/L (ref 0–40)
BILIRUB SERPL-MCNC: 0.7 MG/DL (ref 0–1)
BUN SERPL-MCNC: 14 MG/DL (ref 8–22)
CALCIUM SERPL-MCNC: 9.8 MG/DL (ref 8.5–10.5)
CHLORIDE BLD-SCNC: 100 MMOL/L (ref 98–107)
CO2 SERPL-SCNC: 25 MMOL/L (ref 22–31)
CREAT SERPL-MCNC: 0.79 MG/DL (ref 0.6–1.1)
ERYTHROCYTE [DISTWIDTH] IN BLOOD BY AUTOMATED COUNT: 13.2 % (ref 11–14.5)
FERRITIN SERPL-MCNC: 54 NG/ML (ref 10–130)
FOLATE SERPL-MCNC: 18.1 NG/ML
GFR SERPL CREATININE-BSD FRML MDRD: >60 ML/MIN/1.73M2
GLUCOSE BLD-MCNC: 183 MG/DL (ref 70–125)
HCT VFR BLD AUTO: 46.4 % (ref 35–47)
HGB BLD-MCNC: 15.7 G/DL (ref 12–16)
MCH RBC QN AUTO: 28.4 PG (ref 27–34)
MCHC RBC AUTO-ENTMCNC: 33.8 G/DL (ref 32–36)
MCV RBC AUTO: 84 FL (ref 80–100)
PLATELET # BLD AUTO: 163 THOU/UL (ref 140–440)
PMV BLD AUTO: 11.2 FL (ref 8.5–12.5)
POTASSIUM BLD-SCNC: 5.1 MMOL/L (ref 3.5–5)
PROT SERPL-MCNC: 7.1 G/DL (ref 6–8)
PTH-INTACT SERPL-MCNC: 53 PG/ML (ref 10–86)
RBC # BLD AUTO: 5.53 MILL/UL (ref 3.8–5.4)
SODIUM SERPL-SCNC: 136 MMOL/L (ref 136–145)
VIT B12 SERPL-MCNC: 960 PG/ML (ref 213–816)
WBC: 5.3 THOU/UL (ref 4–11)

## 2018-06-14 ASSESSMENT — MIFFLIN-ST. JEOR: SCORE: 1377.3

## 2018-06-15 LAB — C PEPTIDE SERPL-MCNC: 3.3 NG/ML (ref 0.9–6.9)

## 2018-06-15 RX ORDER — LEVOTHYROXINE SODIUM 150 UG/1
TABLET ORAL
Qty: 90 TABLET | OUTPATIENT
Start: 2018-06-15

## 2018-06-15 RX ORDER — LISINOPRIL 10 MG/1
TABLET ORAL
Qty: 90 TABLET | OUTPATIENT
Start: 2018-06-15

## 2018-06-16 LAB — ZINC SERPL-MCNC: 80 UG/DL (ref 60–120)

## 2018-06-17 LAB
ANNOTATION COMMENT IMP: NORMAL
VIT A SERPL-MCNC: 0.65 MG/L (ref 0.3–1.2)
VIT B1 PYROPHOSHATE BLD-SCNC: 150 NMOL/L (ref 70–180)
VITAMIN A (RETINYL PALMITATE): 0.03 MG/L (ref 0–0.1)

## 2018-06-21 ENCOUNTER — TELEPHONE (OUTPATIENT)
Dept: FAMILY MEDICINE | Facility: CLINIC | Age: 63
End: 2018-06-21

## 2018-06-21 NOTE — TELEPHONE ENCOUNTER
I do not see an order for a stress test. Please order if you agree with it and route back to the pool to contact patient.Kaylin Ashby MA/ROB

## 2018-06-21 NOTE — TELEPHONE ENCOUNTER
Sorry this would require an office visit.    If the patient is having chest pain, RN to please triage.    Bienvenido Colvin M.D.

## 2018-06-21 NOTE — TELEPHONE ENCOUNTER
Patient states Dr. Colvin ordered stress test for patient last year but didn't have it done. Would now like to have new order placed to have stress test done. Please call to discuss or once order is placed./Briana Cortez,

## 2018-06-21 NOTE — TELEPHONE ENCOUNTER
Pt notified of provider message.  She will schedule an appointment closer to when she will have surgery.  She denies any chest pain.  Pt advised to have pre op 2-3 weeks before surgery so there is time for stress test if needed.  Randi COELLON, RN

## 2018-06-22 ENCOUNTER — AMBULATORY - HEALTHEAST (OUTPATIENT)
Dept: SURGERY | Facility: CLINIC | Age: 63
End: 2018-06-22

## 2018-06-22 DIAGNOSIS — E11.9 TYPE 2 DIABETES, HBA1C GOAL < 8% (H): ICD-10-CM

## 2018-06-27 ENCOUNTER — OFFICE VISIT (OUTPATIENT)
Dept: SLEEP MEDICINE | Facility: CLINIC | Age: 63
End: 2018-06-27
Attending: PHYSICIAN ASSISTANT
Payer: COMMERCIAL

## 2018-06-27 VITALS
DIASTOLIC BLOOD PRESSURE: 70 MMHG | WEIGHT: 194 LBS | HEART RATE: 87 BPM | BODY MASS INDEX: 35.7 KG/M2 | OXYGEN SATURATION: 96 % | HEIGHT: 62 IN | SYSTOLIC BLOOD PRESSURE: 103 MMHG

## 2018-06-27 DIAGNOSIS — G47.00 INSOMNIA, UNSPECIFIED TYPE: ICD-10-CM

## 2018-06-27 DIAGNOSIS — G47.33 OSA (OBSTRUCTIVE SLEEP APNEA): Primary | ICD-10-CM

## 2018-06-27 PROCEDURE — 99204 OFFICE O/P NEW MOD 45 MIN: CPT | Performed by: INTERNAL MEDICINE

## 2018-06-27 NOTE — LETTER
6/27/2018        RE: Kenna Colindres  [address] [place] [id number]        Chief complaint: Current CPAP machine is broken she is currently untreated    History of Present Illness: 63-year-old female diagnosed with obstructive sleep apnea she believes about 18 years ago.  She is not sure how severe was but she was told that she needed to treat with CPAP.  She most recently has been using an old machine of her 's.  She is not sure what it was set at.  She struggles with difficulty falling asleep and staying asleep.  The CPAP does not seem to help with this a whole lot.  Even without CPAP she continues to struggle.  However, her  observes that her sleep is more sound when she uses the CPAP.  Certainly the snoring is resolved on Pap therapy.  Patient complains of some restlessness in her body which she states using over-the-counter medication has been helpful.  She also takes gabapentin before bed and during the day for fibromyalgia.  She typically gets into bed around 9 watches TV for a while may try to fall asleep around 11.  Usually falls asleep within 10 minutes to up to 2 hours later.  She wakes up a couple times and nights if she cannot fall back asleep she may walk around.  She thinks that she gets about 4 hours of sleep most nights.  She does take a short 10 minute naps a couple of days a week.  She drinks to 4 caffeinated coffees a day all in the morning.    No history of nightmares sleepwalking or sleep talking.  No dream enactment behavior.  She thinks she may grind her teeth but she does not use a mouthguard.  No morning headaches or disorientation.  Her snoring is very loud but no observed apneas that have been reported recently.  She usually sleeps on her stomach and sides.    Patient has a history of gastric banding surgery and is being considered for a repair/revision.  She needs to have her sleep apnea assessed and treated however prior to this.    [place] Seepiness Scale:9  "(Less than 10 normal)    Past Medical History:   Diagnosis Date     Arthritis      Diabetes mellitus (H)      Fibromyalgia      Hyperlipidemia      Hypertension      MICHEAL (obstructive sleep apnea)      Seasonal allergies      Thyroid disease        Allergies   Allergen Reactions     Metrizamide      Other reaction(s): Other (See Comments)  \"almost  on the table\"     Contrast Dye      Sulfa Drugs        Current Outpatient Prescriptions   Medication     albuterol (PROAIR HFA, PROVENTIL HFA, VENTOLIN HFA) 108 (90 BASE) MCG/ACT inhaler     ASPIRIN LOW DOSE 81 MG EC tablet     empagliflozin (JARDIANCE) 25 MG TABS tablet     gabapentin (NEURONTIN) 300 MG capsule     levothyroxine (LEVOTHROID) 150 MCG tablet     lisinopril (PRINIVIL/ZESTRIL) 10 MG tablet     metFORMIN (GLUCOPHAGE-XR) 500 MG 24 hr tablet     Multiple Vitamin (MULTIVITAMINS PO)     omeprazole (PRILOSEC OTC) 20 MG tablet     pravastatin (PRAVACHOL) 40 MG tablet     sitagliptin (JANUVIA) 100 MG tablet     tocilizumab (ACTEMRA) 162 MG/0.9ML subcutaneous injection     No current facility-administered medications for this visit.        Social History     Social History     Marital status:      Spouse name: N/A     Number of children: N/A     Years of education: N/A     Occupational History     Not on file.     Social History Main Topics     Smoking status: Former Smoker     Quit date: 1984     Smokeless tobacco: Never Used      Comment: smoke free household.     Alcohol use Yes      Comment: rarely     Drug use: No     Sexual activity: Yes     Partners: Male     Other Topics Concern      Service No     Blood Transfusions No     Caffeine Concern No     Occupational Exposure No     Hobby Hazards No     Sleep Concern No     Stress Concern No     Weight Concern No     Special Diet No     Back Care No     Exercise Yes     Bike Helmet Yes     Seat Belt Yes     Self-Exams Yes     Social History Narrative       Family History   Problem Relation Age " "of Onset     Pulmonary Embolism Mother      89     HEART DISEASE Father      Asthma Father      Asthma Daughter      Myocardial Infarction Brother      age 50       Review of Systems: Positve for mild sore throat and postnasal drainage, body aches, and per HPI, otherwise comprehensive review of systems is negative.    EXAM: Alert, pleasant, obese female  /70  Pulse 87  Ht 1.575 m (5' 2\")  Wt 88 kg (194 lb)  SpO2 96%  BMI 35.48 kg/m2  HEENT: Normocephalic/atraumatic, pupils are equal, reactive to light, no scleral icterus  Oropharynx: Mallampati 4, unable to visualize posterior pharynx  Neck supple no lymphadenopathy, neck circumference 15 inches  Chest: Clear to auscultation bilaterally, no rales or wheezes  Cardiac: Regular rate and rhythm, S1-S2 normal  Abdomen: Obese, soft and nontender, bowel sounds present  Extremities: Warm, well perfused, no cyanosis clubbing or edema  Psychiatric: Mood and affect appear normal  Neurologic: No gross focal deficits    PAP Machine set at 12 cwp (no other data available)    ASSESSMENT: 63-year-old female with history of obstructive sleep apnea needs reassessment and treatment. (No previous PSG available.) Also with sleep initiation and maintenance insomnia.  Restlessness component also present.  She may benefit from cognitive behavioral therapy for insomnia however for her reported active thinking and insomnia.    PLAN: We reviewed the pathophysiology of sleep apnea and the importance of treating it particularly in the perioperative period.  We discussed in home testing and in lab testing and will proceed with home test.  Patient will follow with me couple weeks later and will have a DME appointment to follow.  Referral generated for cognitive behavioral therapy for insomnia.  Please see patient instructions for further details of counseling provided.  Patient is agreeable with this plan    Elida Cameron M.D.  Pulmonary/Critical Care/Sleep Medicine    The above " note was dictated using voice recognition software and may include typographical errors. Please contact the author for any clarifications.              Sincerely,        Elida Cameron MD

## 2018-06-27 NOTE — MR AVS SNAPSHOT
"              After Visit Summary   6/27/2018    Kenna Colindres    MRN: [id number]           Patient Information     Date Of Birth          1955        Visit Information        Provider Department      6/27/2018 1:00 PM Elida Cameron MD [facility], [place], Sleep Study        Today's Diagnoses     MICHEAL (obstructive sleep apnea)    -  1    Insomnia, unspecified type          Care Instructions    MY TREATMENT INFORMATION FOR SLEEP APNEA-  Kenna Colindres    DOCTOR : Elida Cameron  SLEEP CENTER :      MY CONTACT NUMBER:     Am I having a sleep study at a sleep center?  Make sure you have an appointment for the study before you leave!    Am I having a home sleep study?  Watch this video:  [email or web address]/[email or web address]?[email or web address]  Please verify your insurance coverage with your insurance carrier    Frequently asked questions:  1. What is Obstructive Sleep Apnea (MICHEAL)? MICHEAL is the most common type of sleep apnea. Apnea means, \"without breath.\"  Apnea is most often caused by narrowing or collapse of the upper airway as muscles relax during sleep.   Almost everyone has occasional apneas. Most people with sleep apnea have had brief interruptions at night frequently for many years.  The severity of sleep apnea is related to how frequent and severe the events are.   2. What are the consequences of MICHEAL? Symptoms include: feeling sleepy during the day, snoring loudly, gasping or stopping of breathing, trouble sleeping, and occasionally morning headaches or heartburn at night.  Sleepiness can be serious and even increase the risk of falling asleep while driving. Other health consequences may include development of high blood pressure and other cardiovascular disease in persons who are susceptible. Untreated MICHEAL  can contribute to heart disease, stroke and diabetes.   3. What are the treatment options? In most situations, sleep apnea is a lifelong disease that must be managed " with daily therapy. Medications are not effective for sleep apnea and surgery is generally not considered until other therapies have been tried. Your treatment is your choice . Continuous Positive Airway (CPAP) works right away and is the therapy that is effective in nearly everyone. An oral device to hold your jaw forward is usually the next most reliable option. Other options include postioning devices (to keep you off your back), weight loss, and surgery including a tongue pacing device. There is more detail about some of these options below.    Important tips for using CPAP and similar devices   Know your equipment:  CPAP is continuous positive airway pressure that prevents obstructive sleep apnea by keeping the throat from collapsing while you are sleeping. In most cases, the device is  smart  and can slowly self-adjusts if your throat collapses and keeps a record every day of how well you are treated-this information is available to you and your care team.  BPAP is bilevel positive airway pressure that keeps your throat open and also assists each breath with a pressure boost to maintain adequate breathing.  Special kinds of BPAP are used in patients who have inadequate breathing from lung or heart disease. In most cases, the device is  smart  and can slowly self-adjusts to assist breathing. Like CPAP, the device keeps a record of how well you are treated.  Your mask is your connection to the device. You get to choose what feels most comfortable and the staff will help to make sure if fits. Here: are some examples of the different masks that are available:       Key points to remember on your journey with sleep apnea:  1. Sleep study.  PAP devices often need to be adjusted during a sleep study to show that they are effective and adjusted right.  2. Good tips to remember: Try wearing just the mask during a quiet time during the day so your body adapts to wearing it. A humidifier is recommended for comfort in  most cases to prevent drying of your nose and throat. Allergy medication from your provider may help you if you are having nasal congestion.  3. Getting settled-in. It takes more than one night for most of us to get used to wearing a mask. Try wearing just the mask during a quiet time during the day so your body adapts to wearing it. A humidifier is recommended for comfort in most cases. Our team will work with you carefully on the first day and will be in contact within 4 days and again at 2 and 4 weeks for advice and remote device adjustments. Your therapy is evaluated by the device each day.   4. Use it every night. The more you are able to sleep naturally for 7-8 hours, the more likely you will have good sleep and to prevent health risks or symptoms from sleep apnea. Even if you use it 4 hours it helps. Occasionally all of us are unable to use a medical therapy, in sleep apnea, it is not dangerous to miss one night.   5. Communicate. Call our skilled team on the number provided on the first day if your visit for problems that make it difficult to wear the device. Over 2 out of 3 patients can learn to wear the device long-term with help from our team. Remember to call our team or your sleep providers if you are unable to wear the device as we may have other solutions for those who cannot adapt to mask CPAP therapy. It is recommended that you sleep your sleep provider within the first 3 months and yearly after that if you are not having problems.   Take care of your equipment. Make sure you clean your mask and tubing using directions every day and that your filter and mask are replaced as recommended or if they are not working.     BESIDES CPAP, WHAT OTHER THERAPIES ARE THERE?    Positioning Device  Positioning devices are generally used when sleep apnea is mild and only occurs on your back.This example shows a pillow that straps around the waist. It may be appropriate for those whose sleep study shows milder  sleep apnea that occurs primarily when lying flat on one's back. Preliminary studies have shown benefit but effectiveness at home may need to be verified by a home sleep test. These devices are generally not covered by medical insurance.  Examples of devices that maintain sleeping on the back to prevent snoring and mild sleep apnea.    Belt type body positioner  Http://[employer].Clean Mobile/    Electronic reminder  Http://nightshifttherapy.com/  Http://www.[employer]indi.Clean Mobile.au/    Oral Appliance  What is oral appliance therapy?  An oral appliance device fits on your teeth at night like a retainer used after having braces. The device is made by a specialized dentist and requires several visits over 1-2 months before a manufactured device is made to fit your teeth and is adjusted to prevent your sleep apnea. Once an oral device is working properly, snoring should be improved. A home sleep test may be recommended at that time if to determine whether the sleep apnea is adequately treated.       Some things to remember:  -Oral devices are often, but not always, covered by your medical insurance. Be sure to check with your insurance provider.   -If you are referred for oral therapy, you will be given a list of specialized dentists to consider or you may choose to visit the Web site of the American Academy of Dental Sleep Medicine  -Oral devices are less likely to work if you have severe sleep apnea or are extremely overweight.     More detailed information  An oral appliance is a small acrylic device that fits over the upper and lower teeth  (similar to a retainer or a mouth guard). This device slightly moves jaw forward, which moves the base of the tongue forward, opens the airway, improves breathing for effective treat snoring and obstructive sleep apnea in perhaps 7 out of 10 people .  The best working devices are custom-made by a dental device  after a mold is made of the teeth 1, 2, 3.  When is an oral appliance  indicated?  Oral appliance therapy is recommended as a first-line treatment for patients with primary snoring, mild sleep apnea, and for patients with moderate sleep apnea who prefer appliance therapy to use of CPAP4, 5. Severity of sleep apnea is determined by sleep testing and is based on the number of respiratory events per hour of sleep.   How successful is oral appliance therapy?  The success rate of oral appliance therapy in patients with mild sleep apnea is 75-80% while in patients with moderate sleep apnea it is 50-70%. The chance of success in patients with severe sleep apnea is 40-50%. The research also shows that oral appliances have a beneficial effect on the cardiovascular health of MICHEAL patients at the same magnitude as CPAP therapy7.  Oral appliances should be a second-line treatment in cases of severe sleep apnea, but if not completely successful then a combination therapy utilizing CPAP plus oral appliance therapy may be effective. Oral appliances tend to be effective in a broad range of patients although studies show that the patients who have the highest success are females, younger patients, those with milder disease, and less severe obesity. 3, 6.   Finding a dentist that practices dental sleep medicine  Specific training is available through the American Academy of Dental Sleep Medicine for dentists interested in working in the field of sleep. To find a dentist who is educated in the field of sleep and the use of oral appliances, near you, visit the Web site of the American Academy of Dental Sleep Medicine.    References  1. Shellie et al. Objectively measured vs self-reported compliance during oral appliance therapy for sleep-disordered breathing. Chest 2013; 144(5): 4974-7715.  2. Albin, et al. Objective measurement of compliance during oral appliance therapy for sleep-disordered breathing. Thorax 2013; 68(1): 91-96.  3. Vonda et al. Mandibular advancement devices in 620 men and  women with MICHEAL and snoring: tolerability and predictors of treatment success. Chest 2004; 125: 2101-3908.  4. Janet et al. Oral appliances for snoring and MICHEAL: a review. Sleep 2006; 29: 244-262.  5. Sandro et al. Oral appliance treatment for MICHEAL: an update. J Clin Sleep Med 2014; 10(2): 215-227.  6. Radha et al. Predictors of OSAH treatment outcome. J Dent Res 2007; 86: 1835-5663.      Weight Loss:    Weight loss is a long-term strategy that may improve sleep apnea in some patients.    Weight management is a personal decision and the decision should be based on your interest and the potential benefits.  If you are interested in exploring weight loss strategies, the following discussion covers the impact on weight loss on sleep apnea and the approaches that may be successful.    Being overweight does not necessarily mean you will have health consequences.  Those who have BMI over 35 or over 27 with existing medical conditions carries greater risk.   Weight loss decreases severity of sleep apnea in most people with obesity. For those with mild obesity who have developed snoring with weight gain, even 15-30 pound weight loss can improve and occasionally eliminate sleep apnea.  Structured and life-long dietary and health habits are necessary to lose weight and keep healthier weight levels.     Though there may be significant health benefits from weight loss, long-term weight loss is very difficult to achieve- studies show success with dietary management in less than 10% of people. In addition, substantial weight loss may require years of dietary control and may be difficult if patients have severe obesity. In these cases, surgical management may be considered.  Finally, older individuals who have tolerated obesity without health complications may be less likely to benefit from weight loss strategies.        Your BMI is Body mass index is 35.48 kg/(m^2).  Weight management is a personal decision.  If you are  interested in exploring weight loss strategies, the following discussion covers the approaches that may be successful. Body mass index (BMI) is one way to tell whether you are at a healthy weight, overweight, or obese. It measures your weight in relation to your height.  A BMI of 18.5 to 24.9 is in the healthy range. A person with a BMI of 25 to 29.9 is considered overweight, and someone with a BMI of 30 or greater is considered obese. More than two-thirds of American adults are considered overweight or obese.  Being overweight or obese increases the risk for further weight gain. Excess weight may lead to heart disease and diabetes.  Creating and following plans for healthy eating and physical activity may help you improve your health.  Weight control is part of healthy lifestyle and includes exercise, emotional health, and healthy eating habits. Careful eating habits lifelong are the mainstay of weight control. Though there are significant health benefits from weight loss, long-term weight loss with diet alone may be very difficult to achieve- studies show long-term success with dietary management in less than 10% of people. Attaining a healthy weight may be especially difficult to achieve in those with severe obesity. In some cases, medications, devices and surgical management might be considered.  What can you do?  If you are overweight or obese and are interested in methods for weight loss, you should discuss this with your provider.     Consider reducing daily calorie intake by 500 calories.     Keep a food journal.     Avoiding skipping meals, consider cutting portions instead.    Diet combined with exercise helps maintain muscle while optimizing fat loss. Strength training is particularly important for building and maintaining muscle mass. Exercise helps reduce stress, increase energy, and improves fitness. Increasing exercise without diet control, however, may not burn enough calories to loose weight.        Start walking three days a week 10-20 minutes at a time    Work towards walking thirty minutes five days a week     Eventually, increase the speed of your walking for 1-2 minutes at time    In addition, we recommend that you review healthy lifestyles and methods for weight loss available through the National Institutes of Health patient information sites:  http://win.niddk.nih.gov/publications/index.htm    And look into health and wellness programs that may be available through your health insurance provider, employer, local community center, or [employer].    Weight management plan: continue your bariatric eval      Surgery:    Surgery for obstructive sleep apnea is considered generally only when other therapies fail to work. Surgery may be discussed with you if you are having a difficult time tolerating CPAP and or when there is an abnormal structure that requires surgical correction.  Nose and throat surgeries often enlarge the airway to prevent collapse.  Most of these surgeries create pain for 1-2 weeks and up to half of the most common surgeries are not effective throughout life.  You should carefully discuss the benefits and drawbacks to surgery with your sleep provider and surgeon to determine if it is the best solution for you.   More information  Surgery for MICHEAL is directed at areas that are responsible for narrowing or complete obstruction of the airway during sleep.  There are a wide range of procedures available to enlarge and/or stabilize the airway to prevent blockage of breathing in the three major areas where it can occur: the palate, tongue, and nasal regions.  Successful surgical treatment depends on the accurate identification of the factors responsible for obstructive sleep apnea in each person.  A personalized approach is required because there is no single treatment that works well for everyone.  Because of anatomic variation, consultation with an examination by a sleep surgeon is a  critical first step in determining what surgical options are best for each patient.  In some cases, examination during sedation may be recommended in order to guide the selection of procedures.  Patients will be counseled about risks and benefits as well as the typical recovery course after surgery. Surgery is typically not a cure for a person s MICHEAL.  However, surgery will often significantly improve one s MICHEAL severity (termed  success rate ).  Even in the absence of a cure, surgery will decrease the cardiovascular risk associated with OSA7; improve overall quality of life8 (sleepiness, functionality, sleep quality, etc).      Palate Procedures:  Patients with MICHEAL often have narrowing of their airway in the region of their tonsils and uvula.  The goals of palate procedures are to widen the airway in this region as well as to help the tissues resist collapse.  Modern palate procedure techniques focus on tissue conservation and soft tissue rearrangement, rather than tissue removal.  Often the uvula is preserved in this procedure. Residual sleep apnea is common in patient after pharyngoplasty with an average reduction in sleep apnea events of 33%2.      Tongue Procedures:  ExamWhile patients are awake, the muscles that surround the throat are active and keep this region open for breathing. These muscles relax during sleep, allowing the tongue and other structures to collapse and block breathing.  There are several different tongue procedures available.  Selection of a tongue base procedure depends on characteristics seen on physical exam.  Generally, procedures are aimed at removing bulky tissues in this area or preventing the back of the tongue from falling back during sleep.  Success rates for tongue surgery range from 50-62%3.    Hypoglossal Nerve Stimulation:  Hypoglossal nerve stimulation has recently received approval from the [place] Food and Drug Administration for the treatment of obstructive sleep  apnea.  This is based on research showing that the system was safe and effective in treating sleep apnea6.  Results showed that the median AHI score decreased 68%, from 29.3 to 9.0. This therapy uses an implant system that senses breathing patterns and delivers mild stimulation to airway muscles, which keeps the airway open during sleep.  The system consists of three fully implanted components: a small generator (similar in size to a pacemaker), a breathing sensor, and a stimulation lead.  Using a small handheld remote, a patient turns the therapy on before bed and off upon awakening.    Candidates for this device must be greater than 22 years of age, have moderate to severe MICHEAL (AHI between 20-65), BMI less than 32, have tried CPAP/oral appliance without success, and have appropriate upper airway anatomy (determined by a sleep endoscopy performed by Dr. Ortiz).    Hypoglossal Nerve Stimulation Pathway:    The sleep surgeon s office will work with the patient through the insurance prior-authorization process (including communications and appeals).    Nasal Procedures:  Nasal obstruction can interfere with nasal breathing during the day and night.  Studies have shown that relief of nasal obstruction can improve the ability of some patients to tolerate positive airway pressure therapy for obstructive sleep apnea1.  Treatment options include medications such as nasal saline, topical corticosteroid and antihistamine sprays, and oral medications such as antihistamines or decongestants. Non-surgical treatments can include external nasal dilators for selected patients. If these are not successful by themselves, surgery can improve the nasal airway either alone or in combination with these other options.      Combination Procedures:  Combination of surgical procedures and other treatments may be recommended, particularly if patients have more than one area of narrowing or persistent positional disease.  The success rate of  combination surgery ranges from 66-80%2,3.    References  1. Stephanie ARITA. The Role of the Nose in Snoring and Obstructive Sleep Apnoea: An Update.  Eur Arch Otorhinolaryngol. 2011; 268: 1365-73.  2.  Jeanette SM; Megan JA; Hermila JR; Pallanch JF; Hong MB; Lisy SG; Moises ETIENNE. Surgical modifications of the upper airway for obstructive sleep apnea in adults: a systematic review and meta-analysis. SLEEP 2010;33(10):8024-8662. Gelacio QUICK. Hypopharyngeal surgery in obstructive sleep apnea: an evidence-based medicine review.  Arch Otolaryngol Head Neck Surg. 2006 Feb;132(2):206-13.  3. Artie YH1, Lindy Y, Michael RAQUEL. The efficacy of anatomically based multilevel surgery for obstructive sleep apnea. Otolaryngol Head Neck Surg. 2003 Oct;129(4):327-35.  4. Gelacio QUICK, Goldberg A. Hypopharyngeal Surgery in Obstructive Sleep Apnea: An Evidence-Based Medicine Review. Arch Otolaryngol Head Neck Surg. 2006 Feb;132(2):206-13.  5. Strollo PJ et al. Upper-Airway Stimulation for Obstructive Sleep Apnea.  N Engl J Med. 2014 Jan 9;370(2):139-49.  6. Anushka Y et al. Increased Incidence of Cardiovascular Disease in Middle-aged Men with Obstructive Sleep Apnea. Am J Respir Crit Care Med; 2002 166: 159-165  7. Carlton EM et al. Studying Life Effects and Effectiveness of Palatopharyngoplasty (SLEEP) study: Subjective Outcomes of Isolated Uvulopalatopharyngoplasty. Otolaryngol Head Neck Surg. 2011; 144: 623-631.    [facility]      Treating Insomnia  Good sleeping habits are a key part of treatment. If needed, some medications may help you sleep better at first. Making healthy lifestyle changes and learning to relax can improve your sleep. Treating insomnia takes commitment, but trust that your efforts will pay off. Talk to your doctor before taking any medication.    Healthy Lifestyle  Your lifestyle affects your health and your sleep. Here are some healthy habits:    Keep a regular sleep schedule. Go to bed and get up at the  same time each day.    Exercise regularly. It may help you reduce stress. Avoid strenuous exercise for two to four hours before bedtime.    Avoid or limit naps.    Use your bed only for sleep and sex.    Don t spend too much time in bed trying to fall asleep. If you can t fall asleep, get up and do something until you become tired and drowsy.    Avoid or limit caffeine and nicotine. They can keep you awake at night. Also avoid alcohol. It may help you fall asleep at first, but your sleep will not be restful.    Before Bedtime  To sleep better every night, try these tips:    Have a bedtime routine to let your body and mind know when it s time to sleep.    Going to bed should be relaxing so try to do only relaxing things around bedtime. Sleep will come sooner.    If your worries don t let you sleep, write them down in a diary. Then close it, and go to bed.    Make sure the room is not too hot or too cold. If it s not dark enough, an eye mask can help. If it s noisy, try using earplugs.    Learn to Relax  Stress, anxiety, and body tension may keep you awake at night. To unwind before bedtime, try reading a book, meditation, or yoga. Also, try the following:    Deep breathing. Sit or lie back in a chair. Take a slow, deep breath. Hold it for 5 counts. Then breathe out slowly through your mouth. Keep doing this until you feel relaxed.    Imagery. Think of the last fun trip you took. In your mind, walk through the trip from start to finish. Put as much detail into the memory as you can remember. It will help you relax.    Cognitive Behavioral Treatment (CBT)  CBT is the most effective treatment for long-term insomnia. It tries to address the underlying causes of your sleep problems, including your habits and how you think about sleep.      Individual Therapy   Jeramie Johnson, PHD      Online Programs     www.SHUTi.me (pronounced shut eye). There is a fee for this program. Enter the code  [employer]  if you decide to enroll  in this program.      www.sleepIO.com (pronounced sleep ee oh). There is a fee for this program. Enter the code  [employer]  if you decide to enroll in this program.     Suggested Resources  Insomnia Treatment Books     Overcoming Insomnia by Josr Gustafson and Talita Ochoa (2008)    No More Sleepless Nights by Perry Acosta and Josefina Vega (1996)    Say Walker to Insomnia by Vipin Becerra (2009)    The Insomnia Workbook by Renée Rodriguez and Gilberto Harkins (2009)    The Insomnia Answer by Arash Bradshaw and Elton Barahona (2006)      Stress Management and Relaxation Books    The Relaxation and Stress Reduction Workbook by Mirian Roper, Arleth Barkley and Deven Cisneros (2008)    Stress Management Workbook: Techniques and Self-Assessment Procedures by Erica Soni and Harvey Adkins (1997)    A Mindfulness-Based Stress Reduction Workbook by Sanjay Bryant and Nelsy King (2010)    The Complete Stress Management Workbook by José Miguel Wellington, Zach Bird and Pablito Kyle (1996)    Assert Yourself by Emily Johnson and Stalin Johnson (1977)    Relaxation Resources for Computer Download   These websites offer resources to help you relax. This list is for information only. [facility] is not responsible for the quality of services or the actions of any person or organization.  Progressive Muscle Relaxation (PMR):     http://www.Xfluential/progressive-muscle-relaxation-exercise.html     http://studentsupport.[facility]/counseling/resources/self-help/relaxation-and-stress-management/   Deep Breathing Exercises:    http://www.Xfluential/breathing-awareness.html     Meditation:     www.[employer]    www.theWhale CommunicationsguidedWhale Communicationsmeditation-site.com You may have to pay for some of these resources.    Guided Imagery:    http://www.Xfluential/guided-imagery-scripts.html     http://SafariDesk/library/quzchrsntd-hazqdg-ihqwdrh/     Counseling /  Behavioral Health  [facility]  Visit [email or web address] or call [phone] to find a clinic close to you.      This is not a prescription and these resources are optional. You must pay for any costs when using these resources. Please ask your insurance carrier if you can be reimbursed for these resources. If so, you are responsible for sending the needed details to your insurance carrier. These resources may also be tax deductible as medical expenses. Check with your .     These programs and publications are not affiliated in any way with [facility].                Follow-ups after your visit        Additional Services     SLEEP PSYCHOLOGY REFERRAL       Referral Urgency: Next available    Dr. Jeramie Johnson is at the following clinics on the following days:  [address]        Thursday and Friday (PLEASE CALL [phone] to schedule an appointment).  [facility] [address] [place]       Wednesdays   (PLEASE CALL [phone] to schedule an appointment).     Please be aware that coverage of these services is subject to the terms and limitations of your health insurance plan. Call member services at your health plan with any benefit or coverage questions.     Please bring the following to your appointment:  >> List of current medications   >> This referral request   >> Any documents/labs given to you for this referral                  Follow-up notes from your care team     Return in about 2 weeks (around 7/11/2018), or after sleep test.      Your next 10 appointments already scheduled     Jul 02, 2018 10:30 AM CDT   Return Sleep Patient with BK BED 5   [facility] ([facility])    [address]  [address]  [facility] [place] [address]   [phone]            Jul 03, 2018 11:30 AM CDT   HST Drop Off with BK SC DME   [facility] ([facility])    [address]  [place] [facility] [address]   [phone]            Jul 12, 2018  1:00 PM CDT   New Sleep Patient with Jeramie Johnson PsyD   [facility]    [address]  [place] [facility] [address]   [phone]            Jul 18, 2018 10:00 AM CDT   Return Sleep Patient with Elida Cameron MD   [facility] ([facility])    [address] [place] [address]   [phone]            Jul 24, 2018  4:00 PM CDT   (Arrive by 3:45 PM)   RETURN DIABETES with Johanny Nicole MD   [facility] ([facility])    [address] [place] [id number] [phone]              Future tests that were ordered for you today     Open Future Orders        Priority Expected Expires Ordered    HST-Home Sleep Apnea Test Routine  12/27/2018 6/27/2018            Who to contact     If you have questions or need follow up information about today's clinic visit or your schedule please contact [facility] directly at [phone].  Normal or non-critical lab and imaging results will be communicated to you by [employer], letter or phone within 4 business days after the clinic has received the results. If you do not hear from us within 7 days, please contact the clinic through [employer] or phone. If you have a critical or abnormal lab result, we will notify you by phone as soon as possible.  Submit refill requests through [employer] or call your pharmacy and they will forward the refill request to us. Please allow 3 business days for your refill to be completed.          Additional Information About Your Visit        [employer] Information     [employer] gives you secure access to your electronic health record. If you see a primary care provider, you can also send  "messages to your care team and make appointments. If you have questions, please call your primary care clinic.  If you do not have a primary care provider, please call [phone] and they will assist you.        Care EveryWhere ID     This is your Care EveryWhere ID. This could be used by other organizations to access your [place] medical records  [id number]        Your Vitals Were     Pulse Height Pulse Oximetry BMI (Body Mass Index)          87 1.575 m (5' 2\") 96% 35.48 kg/m2         Blood Pressure from Last 3 Encounters:   06/27/18 103/70   05/07/18 126/83   10/09/17 129/80    Weight from Last 3 Encounters:   06/27/18 88 kg (194 lb)   05/07/18 90.5 kg (199 lb 8.3 oz)   10/09/17 88.9 kg (196 lb)              We Performed the Following     SLEEP EVALUATION & MANAGEMENT REFERRAL - ADULT     SLEEP PSYCHOLOGY REFERRAL        Primary Care Provider Office Phone # Fax #    Bienvenido Colvin [phone] [address] [address] [facility] [address]        Equal Access to Services     [facility]: Hadii hannah ku hadasho Sobridger, waaxda luqadaha, qaybta kaalmada madeleine, velvet schmidt . So [facility] [phone].    ATENCIÓN: Si habla español, tiene a pérez disposición servicios gratuitos de asistencia lingüística. [facility] [phone].    We comply with applicable federal civil rights laws and [place] laws. We do not discriminate on the basis of race, color, national origin, age, disability, sex, sexual orientation, or gender identity.            Thank you!     Thank you for choosing [facility]  for your care. Our goal is always to provide you with excellent care. Hearing back from our patients is one way we can continue to improve our services. Please take a few minutes to complete the written survey that you may receive in the mail after your visit with us. Thank you!             Your Updated Medication List - Protect others around you: Learn how to safely use, " store and throw away your medicines at www.disposemymeds.org.          This list is accurate as of 6/27/18  1:47 PM.  Always use your most recent med list.                   Brand Name Dispense Instructions for use Diagnosis    albuterol 108 (90 Base) MCG/ACT Inhaler    PROAIR HFA/PROVENTIL HFA/VENTOLIN HFA    3 Inhaler    Inhale 2 puffs into the lungs every 6 hours as needed for shortness of breath / dyspnea or wheezing    Bronchospasm       ASPIRIN LOW DOSE 81 MG EC tablet   Generic drug:  aspirin     90 tablet    TAKE 1 TABLET DAILY    Uncontrolled diabetes mellitus type 2 without complications (H)       empagliflozin 25 MG Tabs tablet    JARDIANCE    90 tablet    Take 1 tablet (25 mg) by mouth daily    Uncontrolled type 2 diabetes mellitus without complication, without long-term current use of insulin (H)       gabapentin 300 MG capsule    NEURONTIN    270 capsule    Take 1 capsule (300 mg) by mouth 3 times daily    Fibromyalgia syndrome       levothyroxine 150 MCG tablet    LEVOTHROID    90 tablet    Take 1 tablet (150 mcg) by mouth daily    Hypothyroidism, unspecified type       lisinopril 10 MG tablet    PRINIVIL/ZESTRIL    90 tablet    Take 1 tablet (10 mg) by mouth daily    Essential hypertension with goal blood pressure less than 140/90       metFORMIN 500 MG 24 hr tablet    GLUCOPHAGE-XR    360 tablet    Take 2 tablets (1,000 mg) by mouth 2 times daily (with meals)    Uncontrolled diabetes mellitus type 2 without complications (H)       MULTIVITAMINS PO      Take by mouth daily        omeprazole 20 MG tablet    priLOSEC OTC     Take 20 mg by mouth daily    Hiatal hernia       pravastatin 40 MG tablet    PRAVACHOL    90 tablet    TAKE 1 TABLET BY MOUTH  DAILY    Hyperlipidemia LDL goal <70       sitagliptin 100 MG tablet    JANUVIA    90 tablet    Take 1 tablet (100 mg) by mouth daily    Uncontrolled diabetes mellitus type 2 without complications (H)       tocilizumab 162 MG/0.9ML subcutaneous injection     ACTEMRA     Every week SQ

## 2018-06-27 NOTE — PROGRESS NOTES
Chief complaint: Current CPAP machine is broken she is currently untreated    History of Present Illness: 63-year-old female diagnosed with obstructive sleep apnea she believes about 18 years ago.  She is not sure how severe was but she was told that she needed to treat with CPAP.  She most recently has been using an old machine of her 's.  She is not sure what it was set at.  She struggles with difficulty falling asleep and staying asleep.  The CPAP does not seem to help with this a whole lot.  Even without CPAP she continues to struggle.  However, her  observes that her sleep is more sound when she uses the CPAP.  Certainly the snoring is resolved on Pap therapy.  Patient complains of some restlessness in her body which she states using over-the-counter medication has been helpful.  She also takes gabapentin before bed and during the day for fibromyalgia.  She typically gets into bed around 9 watches TV for a while may try to fall asleep around 11.  Usually falls asleep within 10 minutes to up to 2 hours later.  She wakes up a couple times and nights if she cannot fall back asleep she may walk around.  She thinks that she gets about 4 hours of sleep most nights.  She does take a short 10 minute naps a couple of days a week.  She drinks to 4 caffeinated coffees a day all in the morning.    No history of nightmares sleepwalking or sleep talking.  No dream enactment behavior.  She thinks she may grind her teeth but she does not use a mouthguard.  No morning headaches or disorientation.  Her snoring is very loud but no observed apneas that have been reported recently.  She usually sleeps on her stomach and sides.    Patient has a history of gastric banding surgery and is being considered for a repair/revision.  She needs to have her sleep apnea assessed and treated however prior to this.    [place] Seepiness Scale:9 (Less than 10 normal)    Past Medical History:   Diagnosis Date     Arthritis       "Diabetes mellitus (H)      Fibromyalgia      Hyperlipidemia      Hypertension      MICHEAL (obstructive sleep apnea)      Seasonal allergies      Thyroid disease        Allergies   Allergen Reactions     Metrizamide      Other reaction(s): Other (See Comments)  \"almost  on the table\"     Contrast Dye      Sulfa Drugs        Current Outpatient Prescriptions   Medication     albuterol (PROAIR HFA, PROVENTIL HFA, VENTOLIN HFA) 108 (90 BASE) MCG/ACT inhaler     ASPIRIN LOW DOSE 81 MG EC tablet     empagliflozin (JARDIANCE) 25 MG TABS tablet     gabapentin (NEURONTIN) 300 MG capsule     levothyroxine (LEVOTHROID) 150 MCG tablet     lisinopril (PRINIVIL/ZESTRIL) 10 MG tablet     metFORMIN (GLUCOPHAGE-XR) 500 MG 24 hr tablet     Multiple Vitamin (MULTIVITAMINS PO)     omeprazole (PRILOSEC OTC) 20 MG tablet     pravastatin (PRAVACHOL) 40 MG tablet     sitagliptin (JANUVIA) 100 MG tablet     tocilizumab (ACTEMRA) 162 MG/0.9ML subcutaneous injection     No current facility-administered medications for this visit.        Social History     Social History     Marital status:      Spouse name: N/A     Number of children: N/A     Years of education: N/A     Occupational History     Not on file.     Social History Main Topics     Smoking status: Former Smoker     Quit date: 1984     Smokeless tobacco: Never Used      Comment: smoke free household.     Alcohol use Yes      Comment: rarely     Drug use: No     Sexual activity: Yes     Partners: Male     Other Topics Concern      Service No     Blood Transfusions No     Caffeine Concern No     Occupational Exposure No     Hobby Hazards No     Sleep Concern No     Stress Concern No     Weight Concern No     Special Diet No     Back Care No     Exercise Yes     Bike Helmet Yes     Seat Belt Yes     Self-Exams Yes     Social History Narrative       Family History   Problem Relation Age of Onset     Pulmonary Embolism Mother      89     HEART DISEASE Father      Asthma " "Father      Asthma Daughter      Myocardial Infarction Brother      age 50       Review of Systems: Positve for mild sore throat and postnasal drainage, body aches, and per HPI, otherwise comprehensive review of systems is negative.    EXAM: Alert, pleasant, obese female  /70  Pulse 87  Ht 1.575 m (5' 2\")  Wt 88 kg (194 lb)  SpO2 96%  BMI 35.48 kg/m2  HEENT: Normocephalic/atraumatic, pupils are equal, reactive to light, no scleral icterus  Oropharynx: Mallampati 4, unable to visualize posterior pharynx  Neck supple no lymphadenopathy, neck circumference 15 inches  Chest: Clear to auscultation bilaterally, no rales or wheezes  Cardiac: Regular rate and rhythm, S1-S2 normal  Abdomen: Obese, soft and nontender, bowel sounds present  Extremities: Warm, well perfused, no cyanosis clubbing or edema  Psychiatric: Mood and affect appear normal  Neurologic: No gross focal deficits    PAP Machine set at 12 cwp (no other data available)    ASSESSMENT: 63-year-old female with history of obstructive sleep apnea needs reassessment and treatment. (No previous PSG available.) Also with sleep initiation and maintenance insomnia.  Restlessness component also present.  She may benefit from cognitive behavioral therapy for insomnia however for her reported active thinking and insomnia.    PLAN: We reviewed the pathophysiology of sleep apnea and the importance of treating it particularly in the perioperative period.  We discussed in home testing and in lab testing and will proceed with home test.  Patient will follow with me couple weeks later and will have a DME appointment to follow.  Referral generated for cognitive behavioral therapy for insomnia.  Please see patient instructions for further details of counseling provided.  Patient is agreeable with this plan    Elida Cameron M.D.  Pulmonary/Critical Care/Sleep Medicine    The above note was dictated using voice recognition software and may include typographical " errors. Please contact the author for any clarifications.

## 2018-06-27 NOTE — PATIENT INSTRUCTIONS
"MY TREATMENT INFORMATION FOR SLEEP APNEA-  Kenna Colindres    DOCTOR : Elida Cameron  SLEEP CENTER :      MY CONTACT NUMBER:     Am I having a sleep study at a sleep center?  Make sure you have an appointment for the study before you leave!    Am I having a home sleep study?  Watch this video:  [email or web address]/watch?[email or web address]  Please verify your insurance coverage with your insurance carrier    Frequently asked questions:  1. What is Obstructive Sleep Apnea (MICHEAL)? MICHEAL is the most common type of sleep apnea. Apnea means, \"without breath.\"  Apnea is most often caused by narrowing or collapse of the upper airway as muscles relax during sleep.   Almost everyone has occasional apneas. Most people with sleep apnea have had brief interruptions at night frequently for many years.  The severity of sleep apnea is related to how frequent and severe the events are.   2. What are the consequences of MICHEAL? Symptoms include: feeling sleepy during the day, snoring loudly, gasping or stopping of breathing, trouble sleeping, and occasionally morning headaches or heartburn at night.  Sleepiness can be serious and even increase the risk of falling asleep while driving. Other health consequences may include development of high blood pressure and other cardiovascular disease in persons who are susceptible. Untreated MICHEAL  can contribute to heart disease, stroke and diabetes.   3. What are the treatment options? In most situations, sleep apnea is a lifelong disease that must be managed with daily therapy. Medications are not effective for sleep apnea and surgery is generally not considered until other therapies have been tried. Your treatment is your choice . Continuous Positive Airway (CPAP) works right away and is the therapy that is effective in nearly everyone. An oral device to hold your jaw forward is usually the next most reliable option. Other options include postioning " devices (to keep you off your back), weight loss, and surgery including a tongue pacing device. There is more detail about some of these options below.    Important tips for using CPAP and similar devices   Know your equipment:  CPAP is continuous positive airway pressure that prevents obstructive sleep apnea by keeping the throat from collapsing while you are sleeping. In most cases, the device is  smart  and can slowly self-adjusts if your throat collapses and keeps a record every day of how well you are treated-this information is available to you and your care team.  BPAP is bilevel positive airway pressure that keeps your throat open and also assists each breath with a pressure boost to maintain adequate breathing.  Special kinds of BPAP are used in patients who have inadequate breathing from lung or heart disease. In most cases, the device is  smart  and can slowly self-adjusts to assist breathing. Like CPAP, the device keeps a record of how well you are treated.  Your mask is your connection to the device. You get to choose what feels most comfortable and the staff will help to make sure if fits. Here: are some examples of the different masks that are available:       Key points to remember on your journey with sleep apnea:  1. Sleep study.  PAP devices often need to be adjusted during a sleep study to show that they are effective and adjusted right.  2. Good tips to remember: Try wearing just the mask during a quiet time during the day so your body adapts to wearing it. A humidifier is recommended for comfort in most cases to prevent drying of your nose and throat. Allergy medication from your provider may help you if you are having nasal congestion.  3. Getting settled-in. It takes more than one night for most of us to get used to wearing a mask. Try wearing just the mask during a quiet time during the day so your body adapts to wearing it. A humidifier is recommended for comfort in most cases. Our team  will work with you carefully on the first day and will be in contact within 4 days and again at 2 and 4 weeks for advice and remote device adjustments. Your therapy is evaluated by the device each day.   4. Use it every night. The more you are able to sleep naturally for 7-8 hours, the more likely you will have good sleep and to prevent health risks or symptoms from sleep apnea. Even if you use it 4 hours it helps. Occasionally all of us are unable to use a medical therapy, in sleep apnea, it is not dangerous to miss one night.   5. Communicate. Call our skilled team on the number provided on the first day if your visit for problems that make it difficult to wear the device. Over 2 out of 3 patients can learn to wear the device long-term with help from our team. Remember to call our team or your sleep providers if you are unable to wear the device as we may have other solutions for those who cannot adapt to mask CPAP therapy. It is recommended that you sleep your sleep provider within the first 3 months and yearly after that if you are not having problems.   Take care of your equipment. Make sure you clean your mask and tubing using directions every day and that your filter and mask are replaced as recommended or if they are not working.     BESIDES CPAP, WHAT OTHER THERAPIES ARE THERE?    Positioning Device  Positioning devices are generally used when sleep apnea is mild and only occurs on your back.This example shows a pillow that straps around the waist. It may be appropriate for those whose sleep study shows milder sleep apnea that occurs primarily when lying flat on one's back. Preliminary studies have shown benefit but effectiveness at home may need to be verified by a home sleep test. These devices are generally not covered by medical insurance.  Examples of devices that maintain sleeping on the back to prevent snoring and mild sleep apnea.    Belt type body positioner  Http://Glints/    Electronic  reminder  Http://nightshifttherapy.com/  Http://www.[employer].com.au/    Oral Appliance  What is oral appliance therapy?  An oral appliance device fits on your teeth at night like a retainer used after having braces. The device is made by a specialized dentist and requires several visits over 1-2 months before a manufactured device is made to fit your teeth and is adjusted to prevent your sleep apnea. Once an oral device is working properly, snoring should be improved. A home sleep test may be recommended at that time if to determine whether the sleep apnea is adequately treated.       Some things to remember:  -Oral devices are often, but not always, covered by your medical insurance. Be sure to check with your insurance provider.   -If you are referred for oral therapy, you will be given a list of specialized dentists to consider or you may choose to visit the Web site of the American Academy of Dental Sleep Medicine  -Oral devices are less likely to work if you have severe sleep apnea or are extremely overweight.     More detailed information  An oral appliance is a small acrylic device that fits over the upper and lower teeth  (similar to a retainer or a mouth guard). This device slightly moves jaw forward, which moves the base of the tongue forward, opens the airway, improves breathing for effective treat snoring and obstructive sleep apnea in perhaps 7 out of 10 people .  The best working devices are custom-made by a dental device  after a mold is made of the teeth 1, 2, 3.  When is an oral appliance indicated?  Oral appliance therapy is recommended as a first-line treatment for patients with primary snoring, mild sleep apnea, and for patients with moderate sleep apnea who prefer appliance therapy to use of CPAP4, 5. Severity of sleep apnea is determined by sleep testing and is based on the number of respiratory events per hour of sleep.   How successful is oral appliance therapy?  The success rate  of oral appliance therapy in patients with mild sleep apnea is 75-80% while in patients with moderate sleep apnea it is 50-70%. The chance of success in patients with severe sleep apnea is 40-50%. The research also shows that oral appliances have a beneficial effect on the cardiovascular health of MICHEAL patients at the same magnitude as CPAP therapy7.  Oral appliances should be a second-line treatment in cases of severe sleep apnea, but if not completely successful then a combination therapy utilizing CPAP plus oral appliance therapy may be effective. Oral appliances tend to be effective in a broad range of patients although studies show that the patients who have the highest success are females, younger patients, those with milder disease, and less severe obesity. 3, 6.   Finding a dentist that practices dental sleep medicine  Specific training is available through the American Academy of Dental Sleep Medicine for dentists interested in working in the field of sleep. To find a dentist who is educated in the field of sleep and the use of oral appliances, near you, visit the Web site of the American Academy of Dental Sleep Medicine.    References  1. Shellie et al. Objectively measured vs self-reported compliance during oral appliance therapy for sleep-disordered breathing. Chest 2013; 144(5): 6811-5405.  2. Albin, et al. Objective measurement of compliance during oral appliance therapy for sleep-disordered breathing. Thorax 2013; 68(1): 91-96.  3. Vonda et al. Mandibular advancement devices in 620 men and women with MICHEAL and snoring: tolerability and predictors of treatment success. Chest 2004; 125: 4132-9046.  4. Janet, et al. Oral appliances for snoring and MICHEAL: a review. Sleep 2006; 29: 244-262.  5. Sandro et al. Oral appliance treatment for MICHEAL: an update. J Clin Sleep Med 2014; 10(2): 215-227.  6. Radha et al. Predictors of OSAH treatment outcome. J Dent Res 2007; 86: 0192-5752.       Weight Loss:    Weight loss is a long-term strategy that may improve sleep apnea in some patients.    Weight management is a personal decision and the decision should be based on your interest and the potential benefits.  If you are interested in exploring weight loss strategies, the following discussion covers the impact on weight loss on sleep apnea and the approaches that may be successful.    Being overweight does not necessarily mean you will have health consequences.  Those who have BMI over 35 or over 27 with existing medical conditions carries greater risk.   Weight loss decreases severity of sleep apnea in most people with obesity. For those with mild obesity who have developed snoring with weight gain, even 15-30 pound weight loss can improve and occasionally eliminate sleep apnea.  Structured and life-long dietary and health habits are necessary to lose weight and keep healthier weight levels.     Though there may be significant health benefits from weight loss, long-term weight loss is very difficult to achieve- studies show success with dietary management in less than 10% of people. In addition, substantial weight loss may require years of dietary control and may be difficult if patients have severe obesity. In these cases, surgical management may be considered.  Finally, older individuals who have tolerated obesity without health complications may be less likely to benefit from weight loss strategies.        Your BMI is Body mass index is 35.48 kg/(m^2).  Weight management is a personal decision.  If you are interested in exploring weight loss strategies, the following discussion covers the approaches that may be successful. Body mass index (BMI) is one way to tell whether you are at a healthy weight, overweight, or obese. It measures your weight in relation to your height.  A BMI of 18.5 to 24.9 is in the healthy range. A person with a BMI of 25 to 29.9 is considered overweight, and someone with a BMI  of 30 or greater is considered obese. More than two-thirds of American adults are considered overweight or obese.  Being overweight or obese increases the risk for further weight gain. Excess weight may lead to heart disease and diabetes.  Creating and following plans for healthy eating and physical activity may help you improve your health.  Weight control is part of healthy lifestyle and includes exercise, emotional health, and healthy eating habits. Careful eating habits lifelong are the mainstay of weight control. Though there are significant health benefits from weight loss, long-term weight loss with diet alone may be very difficult to achieve- studies show long-term success with dietary management in less than 10% of people. Attaining a healthy weight may be especially difficult to achieve in those with severe obesity. In some cases, medications, devices and surgical management might be considered.  What can you do?  If you are overweight or obese and are interested in methods for weight loss, you should discuss this with your provider.     Consider reducing daily calorie intake by 500 calories.     Keep a food journal.     Avoiding skipping meals, consider cutting portions instead.    Diet combined with exercise helps maintain muscle while optimizing fat loss. Strength training is particularly important for building and maintaining muscle mass. Exercise helps reduce stress, increase energy, and improves fitness. Increasing exercise without diet control, however, may not burn enough calories to loose weight.       Start walking three days a week 10-20 minutes at a time    Work towards walking thirty minutes five days a week     Eventually, increase the speed of your walking for 1-2 minutes at time    In addition, we recommend that you review healthy lifestyles and methods for weight loss available through the National Institutes of Health patient information sites:   http://win.niddk.nih.gov/publications/index.htm    And look into health and wellness programs that may be available through your health insurance provider, employer, local community center, or [employer].    Weight management plan: continue your bariatric eval      Surgery:    Surgery for obstructive sleep apnea is considered generally only when other therapies fail to work. Surgery may be discussed with you if you are having a difficult time tolerating CPAP and or when there is an abnormal structure that requires surgical correction.  Nose and throat surgeries often enlarge the airway to prevent collapse.  Most of these surgeries create pain for 1-2 weeks and up to half of the most common surgeries are not effective throughout life.  You should carefully discuss the benefits and drawbacks to surgery with your sleep provider and surgeon to determine if it is the best solution for you.   More information  Surgery for MICHEAL is directed at areas that are responsible for narrowing or complete obstruction of the airway during sleep.  There are a wide range of procedures available to enlarge and/or stabilize the airway to prevent blockage of breathing in the three major areas where it can occur: the palate, tongue, and nasal regions.  Successful surgical treatment depends on the accurate identification of the factors responsible for obstructive sleep apnea in each person.  A personalized approach is required because there is no single treatment that works well for everyone.  Because of anatomic variation, consultation with an examination by a sleep surgeon is a critical first step in determining what surgical options are best for each patient.  In some cases, examination during sedation may be recommended in order to guide the selection of procedures.  Patients will be counseled about risks and benefits as well as the typical recovery course after surgery. Surgery is typically not a cure for a person s MICHEAL.  However,  surgery will often significantly improve one s MICHEAL severity (termed  success rate ).  Even in the absence of a cure, surgery will decrease the cardiovascular risk associated with OSA7; improve overall quality of life8 (sleepiness, functionality, sleep quality, etc).      Palate Procedures:  Patients with MICHEAL often have narrowing of their airway in the region of their tonsils and uvula.  The goals of palate procedures are to widen the airway in this region as well as to help the tissues resist collapse.  Modern palate procedure techniques focus on tissue conservation and soft tissue rearrangement, rather than tissue removal.  Often the uvula is preserved in this procedure. Residual sleep apnea is common in patient after pharyngoplasty with an average reduction in sleep apnea events of 33%2.      Tongue Procedures:  ExamWhile patients are awake, the muscles that surround the throat are active and keep this region open for breathing. These muscles relax during sleep, allowing the tongue and other structures to collapse and block breathing.  There are several different tongue procedures available.  Selection of a tongue base procedure depends on characteristics seen on physical exam.  Generally, procedures are aimed at removing bulky tissues in this area or preventing the back of the tongue from falling back during sleep.  Success rates for tongue surgery range from 50-62%3.    Hypoglossal Nerve Stimulation:  Hypoglossal nerve stimulation has recently received approval from the [place] Food and Drug Administration for the treatment of obstructive sleep apnea.  This is based on research showing that the system was safe and effective in treating sleep apnea6.  Results showed that the median AHI score decreased 68%, from 29.3 to 9.0. This therapy uses an implant system that senses breathing patterns and delivers mild stimulation to airway muscles, which keeps the airway open during sleep.  The system consists of  three fully implanted components: a small generator (similar in size to a pacemaker), a breathing sensor, and a stimulation lead.  Using a small handheld remote, a patient turns the therapy on before bed and off upon awakening.    Candidates for this device must be greater than 22 years of age, have moderate to severe MICHEAL (AHI between 20-65), BMI less than 32, have tried CPAP/oral appliance without success, and have appropriate upper airway anatomy (determined by a sleep endoscopy performed by Dr. Ortiz).    Hypoglossal Nerve Stimulation Pathway:    The sleep surgeon s office will work with the patient through the insurance prior-authorization process (including communications and appeals).    Nasal Procedures:  Nasal obstruction can interfere with nasal breathing during the day and night.  Studies have shown that relief of nasal obstruction can improve the ability of some patients to tolerate positive airway pressure therapy for obstructive sleep apnea1.  Treatment options include medications such as nasal saline, topical corticosteroid and antihistamine sprays, and oral medications such as antihistamines or decongestants. Non-surgical treatments can include external nasal dilators for selected patients. If these are not successful by themselves, surgery can improve the nasal airway either alone or in combination with these other options.      Combination Procedures:  Combination of surgical procedures and other treatments may be recommended, particularly if patients have more than one area of narrowing or persistent positional disease.  The success rate of combination surgery ranges from 66-80%2,3.    References  1. Stephanie ARITA. The Role of the Nose in Snoring and Obstructive Sleep Apnoea: An Update.  Eur Arch Otorhinolaryngol. 2011; 268: 1365-73.  2.  Jeanette SM; Megan JA; Hermila JR; Pallanch JF; Hong DELGADO; Lisy PAZ; Moises ETIENNE. Surgical modifications of the upper airway for obstructive sleep apnea in adults: a  systematic review and meta-analysis. SLEEP 2010;33(10):5883-8773. Gelacio QUICK. Hypopharyngeal surgery in obstructive sleep apnea: an evidence-based medicine review.  Arch Otolaryngol Head Neck Surg. 2006 Feb;132(2):206-13.  3. Artie THACKER, Lindy Y, Michael RAQUEL. The efficacy of anatomically based multilevel surgery for obstructive sleep apnea. Otolaryngol Head Neck Surg. 2003 Oct;129(4):327-35.  4. Kezirian E, Goldberg A. Hypopharyngeal Surgery in Obstructive Sleep Apnea: An Evidence-Based Medicine Review. Arch Otolaryngol Head Neck Surg. 2006 Feb;132(2):206-13.  5. Taj SEAMAN et al. Upper-Airway Stimulation for Obstructive Sleep Apnea.  N Engl J Med. 2014 Jan 9;370(2):139-49.  6. Anushka Y et al. Increased Incidence of Cardiovascular Disease in Middle-aged Men with Obstructive Sleep Apnea. Am J Respir Crit Care Med; 2002 166: 159-165  7. Bianka EM et al. Studying Life Effects and Effectiveness of Palatopharyngoplasty (SLEEP) study: Subjective Outcomes of Isolated Uvulopalatopharyngoplasty. Otolaryngol Head Neck Surg. 2011; 144: 623-631.    [facility] [facility]      Treating Insomnia  Good sleeping habits are a key part of treatment. If needed, some medications may help you sleep better at first. Making healthy lifestyle changes and learning to relax can improve your sleep. Treating insomnia takes commitment, but trust that your efforts will pay off. Talk to your doctor before taking any medication.    Healthy Lifestyle  Your lifestyle affects your health and your sleep. Here are some healthy habits:    Keep a regular sleep schedule. Go to bed and get up at the same time each day.    Exercise regularly. It may help you reduce stress. Avoid strenuous exercise for two to four hours before bedtime.    Avoid or limit naps.    Use your bed only for sleep and sex.    Don t spend too much time in bed trying to fall asleep. If you can t fall asleep, get up and do something until you become tired and drowsy.    Avoid or limit  caffeine and nicotine. They can keep you awake at night. Also avoid alcohol. It may help you fall asleep at first, but your sleep will not be restful.    Before Bedtime  To sleep better every night, try these tips:    Have a bedtime routine to let your body and mind know when it s time to sleep.    Going to bed should be relaxing so try to do only relaxing things around bedtime. Sleep will come sooner.    If your worries don t let you sleep, write them down in a diary. Then close it, and go to bed.    Make sure the room is not too hot or too cold. If it s not dark enough, an eye mask can help. If it s noisy, try using earplugs.    Learn to Relax  Stress, anxiety, and body tension may keep you awake at night. To unwind before bedtime, try reading a book, meditation, or yoga. Also, try the following:    Deep breathing. Sit or lie back in a chair. Take a slow, deep breath. Hold it for 5 counts. Then breathe out slowly through your mouth. Keep doing this until you feel relaxed.    Imagery. Think of the last fun trip you took. In your mind, walk through the trip from start to finish. Put as much detail into the memory as you can remember. It will help you relax.    Cognitive Behavioral Treatment (CBT)  CBT is the most effective treatment for long-term insomnia. It tries to address the underlying causes of your sleep problems, including your habits and how you think about sleep.      Individual Therapy   Jeramie Johnson, PHD      Online Programs     www.eTec (pronounced shut eye). There is a fee for this program. Enter the code  [place]  if you decide to enroll in this program.      www.sleepIO.com (pronounced sleep ee oh). There is a fee for this program. Enter the code  [place]  if you decide to enroll in this program.     Suggested Resources  Insomnia Treatment Books     Overcoming Insomnia by Josr Gustafson and Talita Ochoa (2008)    No More Sleepless Nights by Perry Acosta and Josefina Vega (1996)    Say  Walker to Insomnia by Vipin Becerra (2009)    The Insomnia Workbook by Renée Rodriguez and Gilberto Harkins (2009)    The Insomnia Answer by Arash Bradshaw and Elton Barahona (2006)      Stress Management and Relaxation Books    The Relaxation and Stress Reduction Workbook by Mirian Roper, Arleth Barkley and Deven Cisneros (2008)    Stress Management Workbook: Techniques and Self-Assessment Procedures by Erica Soni and Harvey Adkins (1997)    A Mindfulness-Based Stress Reduction Workbook by Sanjay Bryant and Nelsy King (2010)    The Complete Stress Management Workbook by José Miguel [place], Zach Bird and Pablito Kyle (1996)    Assert Yourself by Emily Johnson and Stalin Johnson (1977)    Relaxation Resources for Computer Download   These websites offer resources to help you relax. This list is for information only. [facility] is not responsible for the quality of services or the actions of any person or organization.  Progressive Muscle Relaxation (PMR):     http://www.[employer]/progressive-muscle-relaxation-exercise.html     http://studentsupport.[facility]/counseling/resources/self-help/relaxation-and-stress-management/   Deep Breathing Exercises:    http://www.[employer]/breathing-awareness.html     Meditation:     www.[employer]da    www.SportsBeat.comguided-meditation-site.com You may have to pay for some of these resources.    Guided Imagery:    http://www.[employer]/guided-imagery-scripts.html     http://[facility]/library/cczhemvmyr-zhvtpo-jezqirl/     Counseling / Behavioral Health  [facility]  Visit [email or web address] or call [phone] to find a clinic close to you.      This is not a prescription and these resources are optional. You must pay for any costs when using these resources. Please ask your insurance carrier if you can be reimbursed for these resources. If so, you are responsible for  sending the needed details to your insurance carrier. These resources may also be tax deductible as medical expenses. Check with your .     These programs and publications are not affiliated in any way with [facility].

## 2018-06-28 ENCOUNTER — COMMUNICATION - HEALTHEAST (OUTPATIENT)
Dept: SURGERY | Facility: CLINIC | Age: 63
End: 2018-06-28

## 2018-06-28 ENCOUNTER — AMBULATORY - HEALTHEAST (OUTPATIENT)
Dept: SURGERY | Facility: CLINIC | Age: 63
End: 2018-06-28

## 2018-06-28 DIAGNOSIS — E11.9 DIABETES MELLITUS, TYPE 2 (H): ICD-10-CM

## 2018-07-02 ENCOUNTER — OFFICE VISIT (OUTPATIENT)
Dept: SLEEP MEDICINE | Facility: CLINIC | Age: 63
End: 2018-07-02
Attending: INTERNAL MEDICINE
Payer: COMMERCIAL

## 2018-07-02 ENCOUNTER — OFFICE VISIT - HEALTHEAST (OUTPATIENT)
Dept: SURGERY | Facility: CLINIC | Age: 63
End: 2018-07-02

## 2018-07-02 DIAGNOSIS — G47.33 OSA (OBSTRUCTIVE SLEEP APNEA): ICD-10-CM

## 2018-07-02 DIAGNOSIS — E66.01 MORBID OBESITY (H): ICD-10-CM

## 2018-07-02 PROCEDURE — G0399 HOME SLEEP TEST/TYPE 3 PORTA: HCPCS | Performed by: INTERNAL MEDICINE

## 2018-07-02 NOTE — MR AVS SNAPSHOT
After Visit Summary   7/2/2018    Kenna Colindres    MRN: [id number]           Patient Information     Date Of Birth          1955        Visit Information        Provider Department      7/2/2018 10:30 AM [facility] BED 5 [facility]        Today's Diagnoses     MICHEAL (obstructive sleep apnea)           Follow-ups after your visit        Your next 10 appointments already scheduled     Jul 03, 2018 11:30 AM CDT   HST Drop Off with [facility] DME   [facility] ([facility])    [address]  [place] [address]   [phone]            Jul 12, 2018  1:00 PM CDT   New Sleep Patient with Jeramie Johnson PsyD   [facility] ([facility])    [address]  [place] [address]   [phone]            Jul 18, 2018 10:00 AM CDT   Return Sleep Patient with Elida Cameron MD   [facility] ([facility])    [address] [place] [address]   [phone]            Jul 24, 2018  4:00 PM CDT   (Arrive by 3:45 PM)   RETURN DIABETES with Johanny Nicole MD   [facility] ([facility])    [address] [place] [address] [phone]              Who to contact     If you have questions or need follow up information about today's clinic visit or your schedule please contact [facility] directly at [phone].  Normal or non-critical lab and imaging results will be communicated to you by MyChart, letter or phone within 4 business days after the clinic has received the results. If you do not hear from us within 7 days, please contact the clinic through MyChart or phone. If you have a critical or abnormal lab result, we will notify you by phone as soon as possible.  Submit refill requests through [facility] or  call your pharmacy and they will forward the refill request to us. Please allow 3 business days for your refill to be completed.          Additional Information About Your Visit        Progressive Book Clubhart Information     [employer] gives you secure access to your electronic health record. If you see a primary care provider, you can also send messages to your care team and make appointments. If you have questions, please call your primary care clinic.  If you do not have a primary care provider, please call [phone] and they will assist you.        Care EveryWhere ID     This is your Care EveryWhere ID. This could be used by other organizations to access your [facility] medical records  [id number]         Blood Pressure from Last 3 Encounters:   06/27/18 103/70   05/07/18 126/83   10/09/17 129/80    Weight from Last 3 Encounters:   06/27/18 88 kg (194 lb)   05/07/18 90.5 kg (199 lb 8.3 oz)   10/09/17 88.9 kg (196 lb)              We Performed the Following     HST-Home Sleep Apnea Test        Primary Care Provider Office Phone # Fax #    Bienvenido Colvin [phone] [address] [facility] [address]        Equal Access to Services     DAY HECTOR : Hadii hannah dos santos hadasho Soomaali, waaxda luqadaha, qaybta kaalmada adeegyada, velvet rodriguez. So [facility] [phone].    ATENCIÓN: Si habla español, tiene a pérez disposición servicios gratuitos de asistencia lingüística. [facility] [phone].    We comply with applicable federal civil rights laws and [place] laws. We do not discriminate on the basis of race, color, national origin, age, disability, sex, sexual orientation, or gender identity.            Thank you!     Thank you for choosing [facility]  for your care. Our goal is always to provide you with excellent care. Hearing back from our patients is one way we can continue to improve our services. Please take a few minutes to complete the written survey that you  may receive in the mail after your visit with us. Thank you!             Your Updated Medication List - Protect others around you: Learn how to safely use, store and throw away your medicines at www.disposemymeds.org.          This list is accurate as of 7/2/18 10:43 AM.  Always use your most recent med list.                   Brand Name Dispense Instructions for use Diagnosis    albuterol 108 (90 Base) MCG/ACT Inhaler    PROAIR HFA/PROVENTIL HFA/VENTOLIN HFA    3 Inhaler    Inhale 2 puffs into the lungs every 6 hours as needed for shortness of breath / dyspnea or wheezing    Bronchospasm       ASPIRIN LOW DOSE 81 MG EC tablet   Generic drug:  aspirin     90 tablet    TAKE 1 TABLET DAILY    Uncontrolled diabetes mellitus type 2 without complications (H)       empagliflozin 25 MG Tabs tablet    JARDIANCE    90 tablet    Take 1 tablet (25 mg) by mouth daily    Uncontrolled type 2 diabetes mellitus without complication, without long-term current use of insulin (H)       gabapentin 300 MG capsule    NEURONTIN    270 capsule    Take 1 capsule (300 mg) by mouth 3 times daily    Fibromyalgia syndrome       levothyroxine 150 MCG tablet    LEVOTHROID    90 tablet    Take 1 tablet (150 mcg) by mouth daily    Hypothyroidism, unspecified type       lisinopril 10 MG tablet    PRINIVIL/ZESTRIL    90 tablet    Take 1 tablet (10 mg) by mouth daily    Essential hypertension with goal blood pressure less than 140/90       metFORMIN 500 MG 24 hr tablet    GLUCOPHAGE-XR    360 tablet    Take 2 tablets (1,000 mg) by mouth 2 times daily (with meals)    Uncontrolled diabetes mellitus type 2 without complications (H)       MULTIVITAMINS PO      Take by mouth daily        omeprazole 20 MG tablet    priLOSEC OTC     Take 20 mg by mouth daily    Hiatal hernia       pravastatin 40 MG tablet    PRAVACHOL    90 tablet    TAKE 1 TABLET BY MOUTH  DAILY    Hyperlipidemia LDL goal <70       sitagliptin 100 MG tablet    JANUVIA    90 tablet    Take 1  tablet (100 mg) by mouth daily    Uncontrolled diabetes mellitus type 2 without complications (H)       tocilizumab 162 MG/0.9ML subcutaneous injection    ACTEMRA     Every week SQ

## 2018-07-03 ENCOUNTER — APPOINTMENT (OUTPATIENT)
Dept: SLEEP MEDICINE | Facility: CLINIC | Age: 63
End: 2018-07-03
Payer: COMMERCIAL

## 2018-07-05 NOTE — PROCEDURES
"HOME SLEEP STUDY INTERPRETATION    Patient: Kenna Colindres  MRN: [id number]  YOB: 1955  Study Date: 7/2/2018  Referring Provider: Prerna Colvin MD  Ordering Provider: Elida Cameron MD     Indications for Home Study: Kenna Colindres is a 63 year old female with a history of hypertension, diabetes, sleep apnea who presents with need to reassess MICHEAL, needs new machine, previous PSB old and not available..    Estimated body mass index is 35.48 kg/(m^2) as calculated from the following:    Height as of 6/27/18: 1.575 m (5' 2\").    Weight as of 6/27/18: 88 kg (194 lb).  Total score - [place]: 9 (6/27/2018 12:00 PM)     Data: A full night home sleep study was performed recording the standard physiologic parameters including body position, movement, sound, nasal pressure, thermal oral airflow, chest and abdominal movements with respiratory inductance plethysmography, and oxygen saturation by pulse oximetry. Pulse rate was estimated by oximetry recording. This study was considered adequate based on > 4 hours of quality oximetry and respiratory recording. As specified by the AASM Manual for the Scoring of Sleep and Associated events, version 2.3, Rule VIII.D 1B, 4% oxygen desaturation scoring for hypopneas is used as a standard of care on all home sleep apnea testing.    Analysis Time:  477 minutes    Respiration:   Sleep Associated Hypoxemia: sustained hypoxemia was not present. Baseline oxygen saturation was 95%.  Time with saturation less than or equal to 88% was <1 minute. The lowest oxygen saturation was 83%.   Snoring: Snoring was present, mild and intermittant.  Respiratory events: The home study revealed a presence of 5 obstructive apneas and 2 mixed and central apneas. There were 41 hypopneas resulting in a combined apnea/hypopnea index [AHI] of 6.0 events per hour.  AHI was 7.7 per hour supine, 8.4 per hour on left side, and 5.3 per hour on right side.   Pattern: Excluding events noted above, " respiratory rate and pattern was Normal.    Position: Percent of time spent: supine - 16%, prone - 0%, on left - 25%, on right - 17%.    Heart Rate: By pulse oximetry normal rate was noted.     Assessment:   Mild obstructive sleep apnea.  Sleep associated hypoxemia was not present.  Home sleep apnea testing may lack sensiitivy to accurately detect mild to moderate disease.    Recommendations:  Consider auto-CPAP at 5-15 cmH2O or oral appliance therapy.  Suggest optimizing sleep hygiene and avoiding sleep deprivation.  Weight management.    Diagnosis Code(s): Obstructive Sleep Apnea G47.33      Elida Cameron MD, July 5, 2018   Diplomate, American Board of Internal Medicine, Sleep Medicine

## 2018-07-09 ENCOUNTER — OFFICE VISIT - HEALTHEAST (OUTPATIENT)
Dept: SURGERY | Facility: CLINIC | Age: 63
End: 2018-07-09

## 2018-07-09 DIAGNOSIS — Z71.3 NUTRITIONAL COUNSELING: ICD-10-CM

## 2018-07-09 DIAGNOSIS — Z98.84 BARIATRIC SURGERY STATUS: ICD-10-CM

## 2018-07-09 DIAGNOSIS — E66.9 OBESITY WITH BODY MASS INDEX OF 30.0-39.9: ICD-10-CM

## 2018-07-09 ASSESSMENT — MIFFLIN-ST. JEOR: SCORE: 1384.1

## 2018-07-18 ENCOUNTER — OFFICE VISIT (OUTPATIENT)
Dept: SLEEP MEDICINE | Facility: CLINIC | Age: 63
End: 2018-07-18
Payer: COMMERCIAL

## 2018-07-18 VITALS
HEIGHT: 62 IN | WEIGHT: 194 LBS | DIASTOLIC BLOOD PRESSURE: 74 MMHG | RESPIRATION RATE: 18 BRPM | OXYGEN SATURATION: 98 % | HEART RATE: 103 BPM | SYSTOLIC BLOOD PRESSURE: 116 MMHG | BODY MASS INDEX: 35.7 KG/M2

## 2018-07-18 DIAGNOSIS — G47.33 OSA (OBSTRUCTIVE SLEEP APNEA): Primary | ICD-10-CM

## 2018-07-18 DIAGNOSIS — F51.04 PSYCHOPHYSIOLOGICAL INSOMNIA: ICD-10-CM

## 2018-07-18 PROCEDURE — 99214 OFFICE O/P EST MOD 30 MIN: CPT | Performed by: INTERNAL MEDICINE

## 2018-07-18 RX ORDER — LIRAGLUTIDE 6 MG/ML
INJECTION SUBCUTANEOUS DAILY
COMMUNITY
End: 2019-09-16

## 2018-07-18 NOTE — PROGRESS NOTES
"Chief complaint: Follow-up home sleep apnea testing    History of Present Illness: 63-year-old female with history of obstructive sleep apnea diagnosed in the early s.  She was on CPAP.  But needs a new machine.  She also has problems with insomnia, particularly sleep initiation.  She reports today that she missed her appointment with the sleep psychologist.  The results of the home sleep apnea test were reviewed with her in detail today she was given a copy of the results.  They show mild sleep apnea.  There may be some inaccuracy in body positions as the patient reported sleeping on her abdomen however the device recorded upright.  Patient also has a history of diabetes.  She denies any new sleep complaints.  She continues to have issues with sleep initiation.    (Patient was an hour late for this appointment, she also missed appointment with Dr. Johnson, she reports difficulties with her schedule as she is helping her mother and her  out with their various medical issues.)    [place] Seepiness Scale:3 (Less than 10 normal)    Past Medical History:   Diagnosis Date     Arthritis      Diabetes mellitus (H)      Fibromyalgia      Hyperlipidemia      Hypertension      MICHEAL (obstructive sleep apnea)      Seasonal allergies      Thyroid disease        Allergies   Allergen Reactions     Metrizamide      Other reaction(s): Other (See Comments)  \"almost  on the table\"     Contrast Dye      Sulfa Drugs        Current Outpatient Prescriptions   Medication     albuterol (PROAIR HFA, PROVENTIL HFA, VENTOLIN HFA) 108 (90 BASE) MCG/ACT inhaler     ASPIRIN LOW DOSE 81 MG EC tablet     empagliflozin (JARDIANCE) 25 MG TABS tablet     gabapentin (NEURONTIN) 300 MG capsule     levothyroxine (LEVOTHROID) 150 MCG tablet     liraglutide (VICTOZA) 18 MG/3ML soln     lisinopril (PRINIVIL/ZESTRIL) 10 MG tablet     metFORMIN (GLUCOPHAGE-XR) 500 MG 24 hr tablet     Multiple Vitamin (MULTIVITAMINS PO)     omeprazole (PRILOSEC " "OTC) 20 MG tablet     pravastatin (PRAVACHOL) 40 MG tablet     sitagliptin (JANUVIA) 100 MG tablet     tocilizumab (ACTEMRA) 162 MG/0.9ML subcutaneous injection     No current facility-administered medications for this visit.        Social History     Social History     Marital status:      Spouse name: N/A     Number of children: N/A     Years of education: N/A     Occupational History     Not on file.     Social History Main Topics     Smoking status: Former Smoker     Quit date: 1/1/1984     Smokeless tobacco: Never Used      Comment: smoke free household.     Alcohol use Yes      Comment: rarely     Drug use: No     Sexual activity: Yes     Partners: Male     Other Topics Concern      Service No     Blood Transfusions No     Caffeine Concern No     Occupational Exposure No     Hobby Hazards No     Sleep Concern No     Stress Concern No     Weight Concern No     Special Diet No     Back Care No     Exercise Yes     Bike Helmet Yes     Seat Belt Yes     Self-Exams Yes     Social History Narrative       Family History   Problem Relation Age of Onset     Pulmonary Embolism Mother      89     HEART DISEASE Father      Asthma Father      Asthma Daughter      Myocardial Infarction Brother      age 50         EXAM: Obese, pleasant, no distress  /74  Pulse 103  Resp 18  Ht 1.575 m (5' 2\")  Wt 88 kg (194 lb)  SpO2 98%  BMI 35.48 kg/m2  Psychiatric: Mood and affect appear normal    HSAT date:7/2/2018  Wt:194  AHI: 6*  *I suspect AHI underestimated due to inaccurate measurement of body positions.  Patient reports sleeping on her abdomen however device recorded upright.    ASSESSMENT: 63-year-old female with diabetes, obesity, insomnia, and overall at least mild obstructive sleep apnea.  Needs new machine.    PLAN: Prescription generated for auto titrating CPAP set between 5 and 15.  Previous machine was set at 13.  She needs sleep therapy management program with close follow-up.  She may need " adjustments in the pressures.  She is going keep detailed sleep log until she sees Dr. Johnson.  She is instructed on how to do this.  I recommended that she avoid going to bed before 11 at this point.  She is to follow-up with me in 7-8 weeks.  She should continue to work on weight control.  Please see patient instructions for further details of counseling provided.    Twenty-five minutes spent with patient, >50% spent counseling and coordinating care.      Elida Cameron M.D.  Pulmonary/Critical Care/Sleep Medicine    The above note was dictated using voice recognition software and may include typographical errors. Please contact the author for any clarifications.

## 2018-07-18 NOTE — PATIENT INSTRUCTIONS
You are going to receive an auto-CPAP device for your MICHEAL. It is unclear at this time whether your MICHEAL is causing your symptoms.       By giving you this device, it will cure your MICHEAL but we don't know if your sleep will improve until after you've worn it for a few weeks. If your original sleep complaints (snoring, tiredness, sleepiness, fatigue, gasping, choking) are resolved with the use of the device, then we will know that your mild/moderate MICHEAL is responsible for your symptoms. At that point, we can discuss if there are any other options for treating your MICHEAL. For this reason, be sure you use your CPAP every night, all night.       After a certain period of time, often 3 months your insurance company (different insurance companies have different policies, be sure to check what your insurance policy is regarding renting CPAP) will purchase the device and you will not be able to return it if you decide you do not want to keep it.       Because of this, if you cannot sleep with the CPAP, or the mask is not as comfortable as you would like, do not wait until the follow up appointment to fix these issues. We can prescribe a temporary sleeping medication (generally for up to a month) to help you fall asleep with the CPAP or we can provide you a different type of CPAP mask so long as you don't keep it for 2 weeks.       If you wait until the follow up visit, and you do not feel any better because you have not worn the CPAP either because you forgot, did not want to try it, the mask did not fit, or you could not fall asleep with it on, we will not be able to determine if the CPAP is helping you or not and you are getting closer to the purchase date of the CPAP.       Bring your CPAP with you to the follow up appointment so we can look at your usage.       Your BMI is Body mass index is 35.48 kg/(m^2).  Weight management is a personal decision.  If you are interested in exploring weight loss strategies, the following  discussion covers the approaches that may be successful. Body mass index (BMI) is one way to tell whether you are at a healthy weight, overweight, or obese. It measures your weight in relation to your height.  A BMI of 18.5 to 24.9 is in the healthy range. A person with a BMI of 25 to 29.9 is considered overweight, and someone with a BMI of 30 or greater is considered obese. More than two-thirds of American adults are considered overweight or obese.  Being overweight or obese increases the risk for further weight gain. Excess weight may lead to heart disease and diabetes.  Creating and following plans for healthy eating and physical activity may help you improve your health.  Weight control is part of healthy lifestyle and includes exercise, emotional health, and healthy eating habits. Careful eating habits lifelong are the mainstay of weight control. Though there are significant health benefits from weight loss, long-term weight loss with diet alone may be very difficult to achieve- studies show long-term success with dietary management in less than 10% of people. Attaining a healthy weight may be especially difficult to achieve in those with severe obesity. In some cases, medications, devices and surgical management might be considered.  What can you do?  If you are overweight or obese and are interested in methods for weight loss, you should discuss this with your provider.     Consider reducing daily calorie intake by 500 calories.     Keep a food journal.     Avoiding skipping meals, consider cutting portions instead.    Diet combined with exercise helps maintain muscle while optimizing fat loss. Strength training is particularly important for building and maintaining muscle mass. Exercise helps reduce stress, increase energy, and improves fitness. Increasing exercise without diet control, however, may not burn enough calories to loose weight.       Start walking three days a week 10-20 minutes at a  time    Work towards walking thirty minutes five days a week     Eventually, increase the speed of your walking for 1-2 minutes at time    In addition, we recommend that you review healthy lifestyles and methods for weight loss available through the National Institutes of Health patient information sites:  http://win.niddk.nih.gov/publications/index.htm    And look into health and wellness programs that may be available through your health insurance provider, employer, local community center, or hailey club.    Weight management plan: Patient was referred to their PCP to discuss a diet and exercise plan.

## 2018-07-18 NOTE — MR AVS SNAPSHOT
After Visit Summary   7/18/2018    Kenna Colindres    MRN: [id number]           Patient Information     Date Of Birth          1955        Visit Information        Provider Department      7/18/2018 10:00 AM Elida Cameron MD [facility], [place], Sleep Study        Today's Diagnoses     MICHEAL (obstructive sleep apnea)          Care Instructions    You are going to receive an auto-CPAP device for your MICHEAL. It is unclear at this time whether your MICHEAL is causing your symptoms.       By giving you this device, it will cure your MICHEAL but we don't know if your sleep will improve until after you've worn it for a few weeks. If your original sleep complaints (snoring, tiredness, sleepiness, fatigue, gasping, choking) are resolved with the use of the device, then we will know that your mild/moderate MICHEAL is responsible for your symptoms. At that point, we can discuss if there are any other options for treating your MICHEAL. For this reason, be sure you use your CPAP every night, all night.       After a certain period of time, often 3 months your insurance company (different insurance companies have different policies, be sure to check what your insurance policy is regarding renting CPAP) will purchase the device and you will not be able to return it if you decide you do not want to keep it.       Because of this, if you cannot sleep with the CPAP, or the mask is not as comfortable as you would like, do not wait until the follow up appointment to fix these issues. We can prescribe a temporary sleeping medication (generally for up to a month) to help you fall asleep with the CPAP or we can provide you a different type of CPAP mask so long as you don't keep it for 2 weeks.       If you wait until the follow up visit, and you do not feel any better because you have not worn the CPAP either because you forgot, did not want to try it, the mask did not fit, or you could not fall asleep with it on, we will not be able  to determine if the CPAP is helping you or not and you are getting closer to the purchase date of the CPAP.       Bring your CPAP with you to the follow up appointment so we can look at your usage.       Your BMI is Body mass index is 35.48 kg/(m^2).  Weight management is a personal decision.  If you are interested in exploring weight loss strategies, the following discussion covers the approaches that may be successful. Body mass index (BMI) is one way to tell whether you are at a healthy weight, overweight, or obese. It measures your weight in relation to your height.  A BMI of 18.5 to 24.9 is in the healthy range. A person with a BMI of 25 to 29.9 is considered overweight, and someone with a BMI of 30 or greater is considered obese. More than two-thirds of American adults are considered overweight or obese.  Being overweight or obese increases the risk for further weight gain. Excess weight may lead to heart disease and diabetes.  Creating and following plans for healthy eating and physical activity may help you improve your health.  Weight control is part of healthy lifestyle and includes exercise, emotional health, and healthy eating habits. Careful eating habits lifelong are the mainstay of weight control. Though there are significant health benefits from weight loss, long-term weight loss with diet alone may be very difficult to achieve- studies show long-term success with dietary management in less than 10% of people. Attaining a healthy weight may be especially difficult to achieve in those with severe obesity. In some cases, medications, devices and surgical management might be considered.  What can you do?  If you are overweight or obese and are interested in methods for weight loss, you should discuss this with your provider.     Consider reducing daily calorie intake by 500 calories.     Keep a food journal.     Avoiding skipping meals, consider cutting portions instead.    Diet combined with exercise  helps maintain muscle while optimizing fat loss. Strength training is particularly important for building and maintaining muscle mass. Exercise helps reduce stress, increase energy, and improves fitness. Increasing exercise without diet control, however, may not burn enough calories to loose weight.       Start walking three days a week 10-20 minutes at a time    Work towards walking thirty minutes five days a week     Eventually, increase the speed of your walking for 1-2 minutes at time    In addition, we recommend that you review healthy lifestyles and methods for weight loss available through the National Institutes of Health patient information sites:  http://win.niddk.nih.gov/publications/index.htm    And look into health and wellness programs that may be available through your health insurance provider, employer, local community center, or hailey club.    Weight management plan: Patient was referred to their PCP to discuss a diet and exercise plan.              Follow-ups after your visit        Follow-up notes from your care team     Return in about 8 weeks (around 9/12/2018).      Your next 10 appointments already scheduled     Jul 23, 2018  8:30 AM CDT   PAP SETUP with YVES DE LA VEGA   [facility] ([facility])    [address] [place] [id number]   [phone]            Jul 24, 2018  4:00 PM CDT   (Arrive by 3:45 PM)   RETURN DIABETES with Johanny Nicole MD   [facility] ([facility])    [address] [place] [id number]   [phone]            Aug 03, 2018  8:30 AM CDT   New Sleep Patient with Jeramie Johnson PsyD   [facility] ([facility])    [address] [place] [id number]   [phone]            Sep 12, 2018  2:20 PM CDT   Return Sleep Patient with Elida Cameron MD   [facility],  "[place], Sleep Study ([facility])    [address] [place] [phone]              Who to contact     If you have questions or need follow up information about today's clinic visit or your schedule please [facility] directly at [phone].  Normal or non-critical lab and imaging results will be communicated to you by MyChart, letter or phone within 4 business days after the clinic has received the results. If you do not hear from us within 7 days, please contact the clinic through [facility] or phone. If you have a critical or abnormal lab result, we will notify you by phone as soon as possible.  Submit refill requests through [employer] or call your pharmacy and they will forward the refill request to us. Please allow 3 business days for your refill to be completed.          Additional Information About Your Visit        [facility] Information     [facility] gives you secure access to your electronic health record. If you see a primary care provider, you can also send messages to your care team and make appointments. If you have questions, please call your primary care clinic.  If you do not have a primary care provider, please call [phone] and they will assist you.        Care EveryWhere ID     This is your Care EveryWhere ID. This could be used by other organizations to access your [facility] medical records  [id number]        Your Vitals Were     Pulse Respirations Height Pulse Oximetry BMI (Body Mass Index)       103 18 1.575 m (5' 2\") 98% 35.48 kg/m2        Blood Pressure from Last 3 Encounters:   07/18/18 116/74   06/27/18 103/70   05/07/18 126/83    Weight from Last 3 Encounters:   07/18/18 88 kg (194 lb)   06/27/18 88 kg (194 lb)   05/07/18 90.5 kg (199 lb 8.3 oz)              We Performed the Following     Comprehensive DME        Primary Care Provider Office Phone # Fax #    Bienvenido Colvin [phone] " [phone]       [address] [facility] [address]        Equal Access to Services     DAY HECTOR : Hadii hannah dos santos haddaniel Sobridger, waaxda luqadaha, qaybta kaalmada adegracie, velvet laytonin hayaaolga felipeliam chamberlain roxana rodriguez. So [facility] [phone].    ATENCIÓN: Si habla español, tiene a pérez disposición servicios gratuitos de asistencia lingüística. Llame al [phone].    We comply with applicable federal civil rights laws and [place] laws. We do not discriminate on the basis of race, color, national origin, age, disability, sex, sexual orientation, or gender identity.            Thank you!     Thank you for choosing [facility], SLEEP STUDY  for your care. Our goal is always to provide you with excellent care. Hearing back from our patients is one way we can continue to improve our services. Please take a few minutes to complete the written survey that you may receive in the mail after your visit with us. Thank you!             Your Updated Medication List - Protect others around you: Learn how to safely use, store and throw away your medicines at www.disposemymeds.org.          This list is accurate as of 7/18/18 11:48 AM.  Always use your most recent med list.                   Brand Name Dispense Instructions for use Diagnosis    albuterol 108 (90 Base) MCG/ACT Inhaler    PROAIR HFA/PROVENTIL HFA/VENTOLIN HFA    3 Inhaler    Inhale 2 puffs into the lungs every 6 hours as needed for shortness of breath / dyspnea or wheezing    Bronchospasm       ASPIRIN LOW DOSE 81 MG EC tablet   Generic drug:  aspirin     90 tablet    TAKE 1 TABLET DAILY    Uncontrolled diabetes mellitus type 2 without complications (H)       empagliflozin 25 MG Tabs tablet    JARDIANCE    90 tablet    Take 1 tablet (25 mg) by mouth daily    Uncontrolled type 2 diabetes mellitus without complication, without long-term current use of insulin (H)       gabapentin 300 MG capsule    NEURONTIN    270 capsule    Take 1 capsule (300 mg) by mouth  3 times daily    Fibromyalgia syndrome       levothyroxine 150 MCG tablet    LEVOTHROID    90 tablet    Take 1 tablet (150 mcg) by mouth daily    Hypothyroidism, unspecified type       liraglutide 18 MG/3ML soln    VICTOZA     Inject Subcutaneous daily        lisinopril 10 MG tablet    PRINIVIL/ZESTRIL    90 tablet    Take 1 tablet (10 mg) by mouth daily    Essential hypertension with goal blood pressure less than 140/90       metFORMIN 500 MG 24 hr tablet    GLUCOPHAGE-XR    360 tablet    Take 2 tablets (1,000 mg) by mouth 2 times daily (with meals)    Uncontrolled diabetes mellitus type 2 without complications (H)       MULTIVITAMINS PO      Take by mouth daily        omeprazole 20 MG tablet    priLOSEC OTC     Take 20 mg by mouth daily    Hiatal hernia       pravastatin 40 MG tablet    PRAVACHOL    90 tablet    TAKE 1 TABLET BY MOUTH  DAILY    Hyperlipidemia LDL goal <70       sitagliptin 100 MG tablet    JANUVIA    90 tablet    Take 1 tablet (100 mg) by mouth daily    Uncontrolled diabetes mellitus type 2 without complications (H)       tocilizumab 162 MG/0.9ML subcutaneous injection    ACTEMRA     Every week SQ

## 2018-07-20 ENCOUNTER — AMBULATORY - HEALTHEAST (OUTPATIENT)
Dept: SURGERY | Facility: CLINIC | Age: 63
End: 2018-07-20

## 2018-07-23 ENCOUNTER — DOCUMENTATION ONLY (OUTPATIENT)
Dept: SLEEP MEDICINE | Facility: CLINIC | Age: 63
End: 2018-07-23
Payer: COMMERCIAL

## 2018-07-23 NOTE — PROGRESS NOTES
Patient was offered choice of vendor and chose [facility].  Kenna Colindres was set up at [place] on July 23, 2018 Patient received a Resmed AirSense 10 Auto. Pressures were set at 5-15 cm H2O.   Patient s ramp is 5 cm H2O for Auto and FLEX/EPR is EPR, 2.  Patient received a [employer] Mask name: Brevida Pillow mask Size X-Small/Small, heated tubing and heated humidifier.  Patient is enrolled in the [facility] Program and does need to meet compliance. Patient has a follow up on 09/12/18 with Dr. Cameron.    Alicia Brown

## 2018-07-24 ENCOUNTER — OFFICE VISIT (OUTPATIENT)
Dept: ENDOCRINOLOGY | Facility: CLINIC | Age: 63
End: 2018-07-24
Payer: COMMERCIAL

## 2018-07-24 ENCOUNTER — RECORDS - HEALTHEAST (OUTPATIENT)
Dept: ADMINISTRATIVE | Facility: OTHER | Age: 63
End: 2018-07-24

## 2018-07-24 VITALS
WEIGHT: 196 LBS | SYSTOLIC BLOOD PRESSURE: 141 MMHG | HEIGHT: 62 IN | DIASTOLIC BLOOD PRESSURE: 83 MMHG | HEART RATE: 103 BPM | BODY MASS INDEX: 36.07 KG/M2

## 2018-07-24 ASSESSMENT — ENCOUNTER SYMPTOMS
CONSTIPATION: 1
HEADACHES: 0
POLYDIPSIA: 0
NAUSEA: 1
WEIGHT GAIN: 0
INCREASED ENERGY: 1
SMELL DISTURBANCE: 0
RECTAL PAIN: 0
BOWEL INCONTINENCE: 0
LOSS OF CONSCIOUSNESS: 0
POLYPHAGIA: 0
ALTERED TEMPERATURE REGULATION: 0
TINGLING: 0
JAUNDICE: 0
PANIC: 0
HEARTBURN: 1
NUMBNESS: 0
INSOMNIA: 1
HALLUCINATIONS: 0
SPEECH CHANGE: 0
DIZZINESS: 1
NAIL CHANGES: 0
DIARRHEA: 0
DEPRESSION: 1
HOARSE VOICE: 0
WEIGHT LOSS: 0
DOUBLE VISION: 1
ABDOMINAL PAIN: 1
SINUS PAIN: 0
EYE REDNESS: 0
TREMORS: 0
CHILLS: 0
BLOOD IN STOOL: 0
DECREASED APPETITE: 0
TASTE DISTURBANCE: 0
NECK MASS: 1
EYE PAIN: 0
MEMORY LOSS: 0
TROUBLE SWALLOWING: 1
DECREASED CONCENTRATION: 1
DISTURBANCES IN COORDINATION: 1
BLOATING: 0
FATIGUE: 1
POOR WOUND HEALING: 0
SINUS CONGESTION: 0
VOMITING: 0
WEAKNESS: 1
EYE WATERING: 1
SORE THROAT: 1
SEIZURES: 0
PARALYSIS: 0
NIGHT SWEATS: 0
NERVOUS/ANXIOUS: 1
FEVER: 0
EYE IRRITATION: 1
SKIN CHANGES: 0

## 2018-07-24 ASSESSMENT — PAIN SCALES - GENERAL: PAINLEVEL: NO PAIN (0)

## 2018-07-24 NOTE — LETTER
7/24/2018       RE: Kenna Colindres  [address] [place], [place] [address] [address]     Dear Colleague,    Thank you for referring your patient, Kenna Colindres, to the [facility] ENDOCRINOLOGY at [facility]. Please see a copy of my visit note below.    [facility]  Endocrinology  Johanny Nicole MD  07/24/2018      Chief Complaint:   Diabetes    History of Present Illness:   Kenna Colindres is a 63 year old female with a history of type 2 diabetes diagnosed in her late 40s who presents for follow up on diabetes.    She is currently taking metformin 1000 mg twice daily, Januvia 100 mg daily and Jardiance 25 mg daily.    She reports that she was prescribed victoza  by her bariatric surgery team few weeks ago.  She is requesting that her  be taught on how to use the injection. She reports that her  has given her few doses but is having difficulty with it. Her  did not accompany her to the clinic today.     Currently she is trying to get a gastric bypass surgery and would like to lower her A1c to 8%. Her most recent A1c on 5/7/18 was 10.4%. Per patient she is compliant with januvia, jardiance and metformin. She reports being sick to her stomach with victoza. She still does not want to deal with needles.     During the appointment she expressed distress over having to take an injection as it hurts her. She reports having phobia of needles and is not happy about starting victoza.  She has not stopped Januvia after starting victoza.     She has hx of depression and reports she gained 30 pounds on psychiatric meds, prompting her to discontinue medication. She would not like to see a doctor for her mood at this time and states that she is not in danger of hurting herself. Currently she feels she needs gastric bypass surgery and will feel much better after the procedure. Additional stress is being added by taking care of her family.    She reports that she has an appointment with a  psychologist next week in the [facility] system.      Blood Glucose Monitoring:    Average 227 with a standard deviation of 46.5. Lowest measured blood glucose of 157. Total of 26 tests.  Denies symptoms of hypoglycemia     Diabetes monitoring and complications:  CAD: No  Last eye exam results: : 04/10/2018  Microalbuminuria: negative 9/27/2017  HTN: Yes  On Statin: Yes  On Aspirin: Yes  Depression: No     Active Medications:     Current Outpatient Prescriptions:      albuterol (PROAIR HFA, PROVENTIL HFA, VENTOLIN HFA) 108 (90 BASE) MCG/ACT inhaler, Inhale 2 puffs into the lungs every 6 hours as needed for shortness of breath / dyspnea or wheezing, Disp: 3 Inhaler, Rfl: 4     ASPIRIN LOW DOSE 81 MG EC tablet, TAKE 1 TABLET DAILY, Disp: 90 tablet, Rfl: 3     empagliflozin (JARDIANCE) 25 MG TABS tablet, Take 1 tablet (25 mg) by mouth daily, Disp: 90 tablet, Rfl: 3     gabapentin (NEURONTIN) 300 MG capsule, Take 1 capsule (300 mg) by mouth 3 times daily, Disp: 270 capsule, Rfl: 1     levothyroxine (LEVOTHROID) 150 MCG tablet, Take 1 tablet (150 mcg) by mouth daily, Disp: 90 tablet, Rfl: 1     liraglutide (VICTOZA) 18 MG/3ML soln, Inject Subcutaneous daily, Disp: , Rfl:      lisinopril (PRINIVIL/ZESTRIL) 10 MG tablet, Take 1 tablet (10 mg) by mouth daily, Disp: 90 tablet, Rfl: 1     metFORMIN (GLUCOPHAGE-XR) 500 MG 24 hr tablet, Take 2 tablets (1,000 mg) by mouth 2 times daily (with meals), Disp: 360 tablet, Rfl: 1     Multiple Vitamin (MULTIVITAMINS PO), Take by mouth daily, Disp: , Rfl:      omeprazole (PRILOSEC OTC) 20 MG tablet, Take 20 mg by mouth daily, Disp: , Rfl:      pravastatin (PRAVACHOL) 40 MG tablet, TAKE 1 TABLET BY MOUTH  DAILY, Disp: 90 tablet, Rfl: 1     sitagliptin (JANUVIA) 100 MG tablet, Take 1 tablet (100 mg) by mouth daily, Disp: 90 tablet, Rfl: 1     tocilizumab (ACTEMRA) 162 MG/0.9ML subcutaneous injection, Every week SQ, Disp: , Rfl:       Allergies:   Metrizamide; Contrast dye; and Sulfa drugs  "     Past Medical History:  rheumatoid arthritis   diabetes mellitus, type 2  Fibromyalgia  hyperlipidemia   hypertension  obstructive sleep apnea  Seasonal allergies  Thyroid disease  Insomnia  Olecranon bursitis of left elbow  Postmenopausal bleeding  hypothyroidism   Depression      Past Surgical History:  Cholecystectomy  Dilation and curettage  ENT surgery  Lap band  Hernia repair  Soft tissue surgery for infections     Family History:   Mother: pulmonary embolism   Father; heart disease, asthma  Daughter: asthma  Brother: myocardial infarction       Social History:      Former smoker, quit in 1984     Physical Exam:   /83  Pulse 103  Ht 1.575 m (5' 2\")  Wt 88.9 kg (196 lb)  BMI 35.85 kg/m2     Wt Readings from Last 10 Encounters:   07/24/18 88.9 kg (196 lb)   07/18/18 88 kg (194 lb)   06/27/18 88 kg (194 lb)   05/07/18 90.5 kg (199 lb 8.3 oz)   10/09/17 88.9 kg (196 lb)   09/27/17 88 kg (194 lb)   05/15/17 85.7 kg (189 lb)   03/27/17 88.5 kg (195 lb)   12/16/16 90.7 kg (200 lb)   09/30/16 88 kg (194 lb)        Constitutional:  comfortable  Neurological: alert and oriented   Psychological: appropriate mood     Data:  Lab Results   Component Value Date     09/27/2017    POTASSIUM 4.4 09/27/2017    CHLORIDE 102 09/27/2017    CO2 28 09/27/2017    ANIONGAP 7 09/27/2017     (H) 09/27/2017    BUN 16 09/27/2017    CR 0.69 09/27/2017    ANANTH 9.1 09/27/2017     Lab Results   Component Value Date    GFRESTIMATED 86 09/27/2017    GFRESTIMATED >90  Non  GFR Calc   03/27/2017    GFRESTIMATED 82 09/26/2016    GFRESTBLACK >90 09/27/2017    GFRESTBLACK >90   GFR Calc   03/27/2017    GFRESTBLACK >90   GFR Calc   09/26/2016      Lab Results   Component Value Date    MICROL <5 09/27/2017    UMALCR Unable to calculate due to low value 09/27/2017        Lab Results   Component Value Date    A1C 10.4 05/07/2018    A1C 10.3 (H) 09/27/2017    A1C 9.8 (H) " 03/29/2017    A1C 8.6 (H) 09/26/2016    A1C 8.8 (H) 04/13/2016       Lab Results   Component Value Date    CHOL 185 09/27/2017    CHOL 234 (H) 03/29/2017    TRIG 203 (H) 09/27/2017    TRIG 178 (H) 03/29/2017    HDL 43 (L) 09/27/2017    HDL 48 (L) 03/29/2017     (H) 09/27/2017     (H) 03/29/2017    NHDL 142 (H) 09/27/2017    NHDL 186 (H) 03/29/2017       Assessment and Plan:     Type 2 diabetes   Uncontrolled diabetes. She was prescribed victoza by her bariatric team, but she has significant needlephobia, and feels overwhelmed by this medication. We discussed use of alternate medications including glipizide and also use of  insulin. possibility of insulin made her more upset. We offered her glipizide, which she will hold off for now. She wants to consider victoza as she is interested in weight loss.   Discussed use of once a week GLP-1 inhibitor, she indicates that she has already paid for victoza and would like to use current supply.  Also discussed stopping Januvia once she restarts Victoza       She would like to meet with diabetic educator with her  in [place] to learn how to use victoza.     Depression/Anxiety  Reports prior history of mood disorders/depression with weight gain on psychiatric medication. She declined psychiatric referral  and stated she is seeing a provider, Arash Obrien, outside of this clinic.      Follow-up: She  would prefer future f/u in [place] location. Also discussed option of consolidating her diabetes/weight management care in the [facility] system. At this time she would like to first meet with diabetic educator with her  in [place] to learn how to use victoza and see if she could tolerate using it chronically.     >50% of 30 minute visit spent in face to face counseling, education and coordination of care related to options for better glycemic control.      Scribe Disclosure:   I, Marcial Schuler, am serving as a scribe to document services  personally performed by Johanny Nicole MD at this visit, based upon the provider's statements to me. All documentation has been reviewed by the aforementioned provider prior to being entered into the official medical record.     Portions of this medical record were completed by a scribe. UPON MY REVIEW AND AUTHENTICATION BY ELECTRONIC SIGNATURE, this confirms (a) I performed the applicable clinical services, and (b) the record is accurate.      RAULITO Chang

## 2018-07-24 NOTE — PROGRESS NOTES
[facility]  Endocrinology  Amichante Nicole MD  07/24/2018      Chief Complaint:   Diabetes    History of Present Illness:   Kenna Colindres is a 63 year old female with a history of type 2 diabetes diagnosed in her late 40s who presents for follow up on diabetes.    She is currently taking metformin 1000 mg twice daily, Januvia 100 mg daily and Jardiance 25 mg daily.    She reports that she was prescribed victoza  by her bariatric surgery team few weeks ago.  She is requesting that her  be taught on how to use the injection. She reports that her  has given her few doses but is having difficulty with it. Her  did not accompany her to the clinic today.     Currently she is trying to get a gastric bypass surgery and would like to lower her A1c to 8%. Her most recent A1c on 5/7/18 was 10.4%. Per patient she is compliant with januvia, jardiance and metformin. She reports being sick to her stomach with victoza. She still does not want to deal with needles.     During the appointment she expressed distress over having to take an injection as it hurts her. She reports having phobia of needles and is not happy about starting victoza.  She has not stopped Januvia after starting victoza.     She has hx of depression and reports she gained 30 pounds on psychiatric meds, prompting her to discontinue medication. She would not like to see a doctor for her mood at this time and states that she is not in danger of hurting herself. Currently she feels she needs gastric bypass surgery and will feel much better after the procedure. Additional stress is being added by taking care of her family.    She reports that she has an appointment with a psychologist next week in the [facility] system.      Blood Glucose Monitoring:    Average 227 with a standard deviation of 46.5. Lowest measured blood glucose of 157. Total of 26 tests.  Denies symptoms of hypoglycemia     Diabetes monitoring and complications:  CAD: No  Last  eye exam results: : 04/10/2018  Microalbuminuria: negative 9/27/2017  HTN: Yes  On Statin: Yes  On Aspirin: Yes  Depression: No    Review of Systems:   Answers for HPI/ROS submitted by the patient on 7/24/2018   General Symptoms: Yes  Skin Symptoms: Yes  HENT Symptoms: Yes  EYE SYMPTOMS: Yes  HEART SYMPTOMS: No  LUNG SYMPTOMS: No  INTESTINAL SYMPTOMS: Yes  URINARY SYMPTOMS: No  GYNECOLOGIC SYMPTOMS: No  BREAST SYMPTOMS: No  SKELETAL SYMPTOMS: No  BLOOD SYMPTOMS: No  NERVOUS SYSTEM SYMPTOMS: Yes  MENTAL HEALTH SYMPTOMS: Yes  Fever: No  Loss of appetite: No  Weight loss: No  Weight gain: No  Fatigue: Yes  Night sweats: No  Chills: No  Increased stress: Yes  Excessive hunger: No  Excessive thirst: No  Feeling hot or cold when others believe the temperature is normal: No  Loss of height: No  Post-operative complications: No  Surgical site pain: No  Hallucinations: No  Change in or Loss of Energy: Yes  Hyperactivity: No  Confusion: No  Changes in hair: No  Changes in moles/birth marks: No  Itching: Yes  Rashes: No  Changes in nails: No  Acne: No  Hair in places you don't want it: No  Change in facial hair: No  Warts: No  Non-healing sores: No  Scarring: No  Flaking of skin: No  Color changes of hands/feet in cold : No  Sun sensitivity: No  Skin thickening: No  Ear pain: No  Ear discharge: No  Hearing loss: No  Tinnitus: No  Nosebleeds: No  Congestion: No  Sinus pain: No  Trouble swallowing: Yes   Voice hoarseness: No  Mouth sores: No  Sore throat: Yes  Tooth pain: No  Gum tenderness: Yes  Bleeding gums: Yes  Change in taste: No  Change in sense of smell: No  Dry mouth: Yes  Hearing aid used: No  Neck lump: Yes  Eye pain: No  Vision loss: No  Dry eyes: No  Watery eyes: Yes  Eye bulging: No  Double vision: Yes  Flashing of lights: No  Spots: No  Floaters: No  Redness: No  Crossed eyes: No  Tunnel Vision: No  Yellowing of eyes: No  Eye irritation: Yes  Heart burn or indigestion: Yes  Nausea: Yes  Vomiting: No  Abdominal  pain: Yes  Bloating: No  Constipation: Yes  Diarrhea: No  Blood in stool: No  Black stools: No  Rectal or Anal pain: No  Fecal incontinence: No  Yellowing of skin or eyes: No  Vomit with blood: No  Change in stools: No  Trouble with coordination: Yes  Dizziness or trouble with balance: Yes  Fainting or black-out spells: No  Memory loss: No  Headache: No  Seizures: No  Speech problems: No  Tingling: No  Tremor: No  Weakness: Yes  Difficulty walking: Yes  Paralysis: No  Numbness: No  Nervous or Anxious: Yes  Depression: Yes  Trouble sleeping: Yes  Trouble thinking or concentrating: Yes  Mood changes: Yes  Panic attacks: No    Active Medications:     Current Outpatient Prescriptions:      albuterol (PROAIR HFA, PROVENTIL HFA, VENTOLIN HFA) 108 (90 BASE) MCG/ACT inhaler, Inhale 2 puffs into the lungs every 6 hours as needed for shortness of breath / dyspnea or wheezing, Disp: 3 Inhaler, Rfl: 4     ASPIRIN LOW DOSE 81 MG EC tablet, TAKE 1 TABLET DAILY, Disp: 90 tablet, Rfl: 3     empagliflozin (JARDIANCE) 25 MG TABS tablet, Take 1 tablet (25 mg) by mouth daily, Disp: 90 tablet, Rfl: 3     gabapentin (NEURONTIN) 300 MG capsule, Take 1 capsule (300 mg) by mouth 3 times daily, Disp: 270 capsule, Rfl: 1     levothyroxine (LEVOTHROID) 150 MCG tablet, Take 1 tablet (150 mcg) by mouth daily, Disp: 90 tablet, Rfl: 1     liraglutide (VICTOZA) 18 MG/3ML soln, Inject Subcutaneous daily, Disp: , Rfl:      lisinopril (PRINIVIL/ZESTRIL) 10 MG tablet, Take 1 tablet (10 mg) by mouth daily, Disp: 90 tablet, Rfl: 1     metFORMIN (GLUCOPHAGE-XR) 500 MG 24 hr tablet, Take 2 tablets (1,000 mg) by mouth 2 times daily (with meals), Disp: 360 tablet, Rfl: 1     Multiple Vitamin (MULTIVITAMINS PO), Take by mouth daily, Disp: , Rfl:      omeprazole (PRILOSEC OTC) 20 MG tablet, Take 20 mg by mouth daily, Disp: , Rfl:      pravastatin (PRAVACHOL) 40 MG tablet, TAKE 1 TABLET BY MOUTH  DAILY, Disp: 90 tablet, Rfl: 1     sitagliptin (JANUVIA) 100 MG  "tablet, Take 1 tablet (100 mg) by mouth daily, Disp: 90 tablet, Rfl: 1     tocilizumab (ACTEMRA) 162 MG/0.9ML subcutaneous injection, Every week SQ, Disp: , Rfl:       Allergies:   Metrizamide; Contrast dye; and Sulfa drugs      Past Medical History:  rheumatoid arthritis   diabetes mellitus, type 2  Fibromyalgia  hyperlipidemia   hypertension  obstructive sleep apnea  Seasonal allergies  Thyroid disease  Insomnia  Olecranon bursitis of left elbow  Postmenopausal bleeding  hypothyroidism   Depression      Past Surgical History:  Cholecystectomy  Dilation and curettage  ENT surgery  Lap band  Hernia repair  Soft tissue surgery for infections     Family History:   Mother: pulmonary embolism   Father; heart disease, asthma  Daughter: asthma  Brother: myocardial infarction       Social History:      Former smoker, quit in 1984     Physical Exam:   /83  Pulse 103  Ht 1.575 m (5' 2\")  Wt 88.9 kg (196 lb)  BMI 35.85 kg/m2     Wt Readings from Last 10 Encounters:   07/24/18 88.9 kg (196 lb)   07/18/18 88 kg (194 lb)   06/27/18 88 kg (194 lb)   05/07/18 90.5 kg (199 lb 8.3 oz)   10/09/17 88.9 kg (196 lb)   09/27/17 88 kg (194 lb)   05/15/17 85.7 kg (189 lb)   03/27/17 88.5 kg (195 lb)   12/16/16 90.7 kg (200 lb)   09/30/16 88 kg (194 lb)        Constitutional:  comfortable  Neurological: alert and oriented   Psychological: appropriate mood     Data:  Lab Results   Component Value Date     09/27/2017    POTASSIUM 4.4 09/27/2017    CHLORIDE 102 09/27/2017    CO2 28 09/27/2017    ANIONGAP 7 09/27/2017     (H) 09/27/2017    BUN 16 09/27/2017    CR 0.69 09/27/2017    ANANTH 9.1 09/27/2017     Lab Results   Component Value Date    GFRESTIMATED 86 09/27/2017    GFRESTIMATED >90  Non  GFR Calc   03/27/2017    GFRESTIMATED 82 09/26/2016    GFRESTBLACK >90 09/27/2017    GFRESTBLACK >90   GFR Calc   03/27/2017    GFRESTBLACK >90   GFR Calc   09/26/2016      Lab " Results   Component Value Date    MICROL <5 09/27/2017    UMALCR Unable to calculate due to low value 09/27/2017        Lab Results   Component Value Date    A1C 10.4 05/07/2018    A1C 10.3 (H) 09/27/2017    A1C 9.8 (H) 03/29/2017    A1C 8.6 (H) 09/26/2016    A1C 8.8 (H) 04/13/2016       Lab Results   Component Value Date    CHOL 185 09/27/2017    CHOL 234 (H) 03/29/2017    TRIG 203 (H) 09/27/2017    TRIG 178 (H) 03/29/2017    HDL 43 (L) 09/27/2017    HDL 48 (L) 03/29/2017     (H) 09/27/2017     (H) 03/29/2017    NHDL 142 (H) 09/27/2017    NHDL 186 (H) 03/29/2017       Assessment and Plan:     Type 2 diabetes   Uncontrolled diabetes. She was prescribed victoza by her bariatric team, but she has significant needlephobia, and feels overwhelmed by this medication. We discussed use of alternate medications including glipizide and also use of  insulin. possibility of insulin made her more upset. We offered her glipizide, which she will hold off for now. She wants to consider victoza as she is interested in weight loss.   Discussed use of once a week GLP-1 inhibitor, she indicates that she has already paid for victoza and would like to use current supply.  Also discussed stopping Januvia once she restarts Victoza       She would like to meet with diabetic educator with her  in [place] to learn how to use victoza.     Depression/Anxiety  Reports prior history of mood disorders/depression with weight gain on psychiatric medication. She declined psychiatric referral  and stated she is seeing a provider, Arash Obrien, outside of this clinic.      Follow-up: She  would prefer future f/u in [place] location. Also discussed option of consolidating her diabetes/weight management care in the [facility] system. At this time she would like to first meet with diabetic educator with her  in [place] to learn how to use victoza and see if she could tolerate using it chronically.     >50% of 30  minute visit spent in face to face counseling, education and coordination of care related to options for better glycemic control.      Scribe Disclosure:   I, Marcial Schuler, am serving as a scribe to document services personally performed by Johanny Nicole MD at this visit, based upon the provider's statements to me. All documentation has been reviewed by the aforementioned provider prior to being entered into the official medical record.     Portions of this medical record were completed by a scribe. UPON MY REVIEW AND AUTHENTICATION BY ELECTRONIC SIGNATURE, this confirms (a) I performed the applicable clinical services, and (b) the record is accurate.      RAULITO Chang

## 2018-07-24 NOTE — MR AVS SNAPSHOT
After Visit Summary   2018    Kenna Colindres    MRN: [id number]           Patient Information     Date Of Birth          1955        Visit Information        Provider Department      2018 4:00 PM Johanny Nicole MD [facility] Endocrinology        Today's Diagnoses     Uncontrolled type 2 diabetes mellitus without complication, without long-term current use of insulin (H)    -  1       Follow-ups after your visit        Your next 10 appointments already scheduled     Aug 03, 2018  8:30 AM CDT   New Sleep Patient with Jeramie Johnson PsyD   [facility] ([facility])    [address]  [place] [address]   [phone]            Sep 24, 2018  9:00 AM CDT   Return Sleep Patient with TOMEKA Cronin   [facility] ([facility])    [address]  [place] [address]   [phone]              Who to contact     Please call your clinic at [phone] to:    Ask questions about your health    Make or cancel appointments    Discuss your medicines    Learn about your test results    Speak to your doctor            Additional Information About Your Visit        MyChart Information     Z[employer] is an electronic gateway that provides easy, online access to your medical records. With [employer], you can request a clinic appointment, read your test results, renew a prescription or communicate with your care team.     To sign up for Zweemiet visit the website at [email or web address]   You will be asked to enter the access code listed below, as well as some personal information. Please follow the directions to create your username and password.     Your access code is: [email or web address]  Expires: 10/25/2018 11:27 AM     Your access code will  in 90 days. If you need help or a new code, please contact your [facility]  "or call [phone] for assistance.        Care EveryWhere ID     This is your Care EveryWhere ID. This could be used by other organizations to access your [place] medical records  [id number]        Your Vitals Were     Pulse Height BMI (Body Mass Index)             103 1.575 m (5' 2\") 35.85 kg/m2          Blood Pressure from Last 3 Encounters:   07/24/18 141/83   07/18/18 116/74   06/27/18 103/70    Weight from Last 3 Encounters:   07/24/18 88.9 kg (196 lb)   07/18/18 88 kg (194 lb)   06/27/18 88 kg (194 lb)              Today, you had the following     No orders found for display       Primary Care Provider Office Phone # Fax #    Bienvenido Colvin [phone] [address] [facility] [address]        Equal Access to Services     [facility]: Hadii aad ku hadasho Soritaali, waaxda luqadaha, qaybta kaalmada adeegyada, velvet hickman hayantn arin schmidt . So [facility] [phone].    ATENCIÓN: Si habla español, tiene a pérez disposición servicios gratuitos de asistencia lingüística. Sancho al [phone].    We comply with applicable federal civil rights laws and [place] laws. We do not discriminate on the basis of race, color, national origin, age, disability, sex, sexual orientation, or gender identity.            Thank you!     Thank you for choosing [facility] ENDOCRINOLOGY  for your care. Our goal is always to provide you with excellent care. Hearing back from our patients is one way we can continue to improve our services. Please take a few minutes to complete the written survey that you may receive in the mail after your visit with us. Thank you!             Your Updated Medication List - Protect others around you: Learn how to safely use, store and throw away your medicines at www.disposemymeds.org.          This list is accurate as of 7/24/18 11:59 PM.  Always use your most recent med list.                   Brand Name Dispense Instructions for use Diagnosis    albuterol 108 (90 " Base) MCG/ACT Inhaler    PROAIR HFA/PROVENTIL HFA/VENTOLIN HFA    3 Inhaler    Inhale 2 puffs into the lungs every 6 hours as needed for shortness of breath / dyspnea or wheezing    Bronchospasm       ASPIRIN LOW DOSE 81 MG EC tablet   Generic drug:  aspirin     90 tablet    TAKE 1 TABLET DAILY    Uncontrolled diabetes mellitus type 2 without complications (H)       empagliflozin 25 MG Tabs tablet    JARDIANCE    90 tablet    Take 1 tablet (25 mg) by mouth daily    Uncontrolled type 2 diabetes mellitus without complication, without long-term current use of insulin (H)       gabapentin 300 MG capsule    NEURONTIN    270 capsule    Take 1 capsule (300 mg) by mouth 3 times daily    Fibromyalgia syndrome       levothyroxine 150 MCG tablet    LEVOTHROID    90 tablet    Take 1 tablet (150 mcg) by mouth daily    Hypothyroidism, unspecified type       liraglutide 18 MG/3ML soln    VICTOZA     Inject Subcutaneous daily        lisinopril 10 MG tablet    PRINIVIL/ZESTRIL    90 tablet    Take 1 tablet (10 mg) by mouth daily    Essential hypertension with goal blood pressure less than 140/90       metFORMIN 500 MG 24 hr tablet    GLUCOPHAGE-XR    360 tablet    Take 2 tablets (1,000 mg) by mouth 2 times daily (with meals)    Uncontrolled diabetes mellitus type 2 without complications (H)       MULTIVITAMINS PO      Take by mouth daily        omeprazole 20 MG tablet    priLOSEC OTC     Take 20 mg by mouth daily    Hiatal hernia       pravastatin 40 MG tablet    PRAVACHOL    90 tablet    TAKE 1 TABLET BY MOUTH  DAILY    Hyperlipidemia LDL goal <70       sitagliptin 100 MG tablet    JANUVIA    90 tablet    Take 1 tablet (100 mg) by mouth daily    Uncontrolled diabetes mellitus type 2 without complications (H)       tocilizumab 162 MG/0.9ML subcutaneous injection    ACTEMRA     Every week SQ

## 2018-07-26 ENCOUNTER — DOCUMENTATION ONLY (OUTPATIENT)
Dept: SLEEP MEDICINE | Facility: CLINIC | Age: 63
End: 2018-07-26

## 2018-07-26 NOTE — PROGRESS NOTES
3 DAY STM VISIT    Diagnostic AHI:  6    Patient contacted for 3 day STM visit  Message left for patient to return call     Device type: Auto-CPAP  PAP settings from order::  CPAP min 5 cm  H20       CPAP max 15 cm  H20    Mask type:    Nasal Pillows     Device settings from machine      Min CPAP 5.0            Max CPAP 15.0      Assessment: Nightly usage, most nights over four hours   Action plan: Pt to have f/u 14 day STM visit.  Patient has a follow up visit scheduled:   yes within 31-90 days of set up.

## 2018-07-27 NOTE — PROGRESS NOTES
Patient returned call.     Subjective measures:  Pt states things are going well and has no issues or complaints.  Pt is benefiting from therapy.Patient meeting subjective benchmarks.     Action plan:pt to have 14 day [facility] visit.

## 2018-07-30 ENCOUNTER — AMBULATORY - HEALTHEAST (OUTPATIENT)
Dept: SURGERY | Facility: CLINIC | Age: 63
End: 2018-07-30

## 2018-07-30 ENCOUNTER — OFFICE VISIT - HEALTHEAST (OUTPATIENT)
Dept: SURGERY | Facility: CLINIC | Age: 63
End: 2018-07-30

## 2018-07-30 DIAGNOSIS — E66.01 MORBID OBESITY (H): ICD-10-CM

## 2018-07-31 ENCOUNTER — TELEPHONE (OUTPATIENT)
Dept: ENDOCRINOLOGY | Facility: CLINIC | Age: 63
End: 2018-07-31

## 2018-07-31 ENCOUNTER — OFFICE VISIT - HEALTHEAST (OUTPATIENT)
Dept: SURGERY | Facility: CLINIC | Age: 63
End: 2018-07-31

## 2018-07-31 ENCOUNTER — COMMUNICATION - HEALTHEAST (OUTPATIENT)
Dept: SURGERY | Facility: CLINIC | Age: 63
End: 2018-07-31

## 2018-07-31 DIAGNOSIS — M06.9 RHEUMATOID ARTHRITIS (H): ICD-10-CM

## 2018-07-31 DIAGNOSIS — E78.5 DYSLIPIDEMIA: ICD-10-CM

## 2018-07-31 DIAGNOSIS — E11.65 POORLY CONTROLLED TYPE 2 DIABETES MELLITUS (H): ICD-10-CM

## 2018-07-31 DIAGNOSIS — G47.33 OBSTRUCTIVE SLEEP APNEA SYNDROME: ICD-10-CM

## 2018-07-31 DIAGNOSIS — K21.9 GASTROESOPHAGEAL REFLUX DISEASE WITHOUT ESOPHAGITIS: ICD-10-CM

## 2018-07-31 DIAGNOSIS — Z98.84 BARIATRIC SURGERY STATUS: ICD-10-CM

## 2018-07-31 ASSESSMENT — MIFFLIN-ST. JEOR: SCORE: 1363.69

## 2018-07-31 NOTE — TELEPHONE ENCOUNTER
Left vm for pt requesting she call [phone] to schedule an appt to learn how to inject Victoza.     Mona Alves RN, BSN, CDE   [facility]    1pm: Pt returned call. Left vm on my nurse line. States no need to come in for an appt. She and her  have learned how to inject Victoza and have been doing the injections for awhile and are comfortable doing so.      Mona Alves RN, BSN, [facility]

## 2018-08-03 ENCOUNTER — AMBULATORY - HEALTHEAST (OUTPATIENT)
Dept: SURGERY | Facility: CLINIC | Age: 63
End: 2018-08-03

## 2018-08-03 DIAGNOSIS — E11.9 DIABETES MELLITUS, TYPE 2 (H): ICD-10-CM

## 2018-08-05 DIAGNOSIS — E03.9 HYPOTHYROIDISM, UNSPECIFIED TYPE: ICD-10-CM

## 2018-08-05 DIAGNOSIS — I10 ESSENTIAL HYPERTENSION WITH GOAL BLOOD PRESSURE LESS THAN 140/90: ICD-10-CM

## 2018-08-05 NOTE — LETTER
August 8, 2018    Kenna Colindres  [address] [place] [id number]    Dear Kenna,       We recently received a refill request for levothyroxine and lisinopril.  We have refilled this for a one time 90 day supply only because you are due for a:    Thyroid, blood pressure and diabetes office visit and fasting lab appointment-due in October.      Please schedule this lab appointment 4-5 days prior to the office visit.     Please call at your earliest convenience so that there will not be a delay with your future refills.          Thank you,   Your [facility] Team/  [phone]

## 2018-08-06 ENCOUNTER — HOSPITAL ENCOUNTER (OUTPATIENT)
Dept: RADIOLOGY | Facility: HOSPITAL | Age: 63
Discharge: HOME | End: 2018-08-06
Attending: SPECIALIST

## 2018-08-06 DIAGNOSIS — Z98.84 BARIATRIC SURGERY STATUS: ICD-10-CM

## 2018-08-07 ENCOUNTER — DOCUMENTATION ONLY (OUTPATIENT)
Dept: SLEEP MEDICINE | Facility: CLINIC | Age: 63
End: 2018-08-07

## 2018-08-07 RX ORDER — LEVOTHYROXINE SODIUM 150 UG/1
TABLET ORAL
Qty: 90 TABLET | Refills: 0 | Status: SHIPPED | OUTPATIENT
Start: 2018-08-07 | End: 2018-11-26

## 2018-08-07 RX ORDER — LISINOPRIL 10 MG/1
TABLET ORAL
Qty: 90 TABLET | Refills: 0 | Status: SHIPPED | OUTPATIENT
Start: 2018-08-07 | End: 2018-11-26

## 2018-08-07 NOTE — PROGRESS NOTES
14 DAY STM VISIT    Diagnostic AHI:  6 HST    Message left for patient to return call     Assessment: Pt meeting objective benchmarks.     Action plan: waiting for patient to return call and pt to have 30 day STM visit.    Device type: Auto-CPAP  PAP settings: CPAP min 5.0 cm  H20     CPAP max 15.0 cm  H20    95th% pressure 10.6 cm   Mask type:  Nasal Pillows  Objective measures: 14 day rolling measures      Compliance  92 %      Leak  15.28 lpm  last  upload      AHI 1.44   last  upload      Average number of minutes 457     Average hours of usage 7.6          Objective measure goal  Compliance   Goal >70%  Leak   Goal < 24 lpm  AHI  Goal < 5  Usage  Goal >240

## 2018-08-07 NOTE — TELEPHONE ENCOUNTER
Due for labs and office visit 10/17.    please send letter to scheduled apt for thyroid, BP and diabetes and labs.  Catherine Bergeron RN

## 2018-08-22 ENCOUNTER — DOCUMENTATION ONLY (OUTPATIENT)
Dept: SLEEP MEDICINE | Facility: CLINIC | Age: 63
End: 2018-08-22

## 2018-08-22 NOTE — PROGRESS NOTES
30 DAY STM VISIT    Diagnostic AHI:  6 HST    Message left for patient to return call     Assessment: Pt meeting objective benchmarks.     Action plan: waiting for patient to return call.  and pt to have 6 month STM visit  Patient has a follow up visit with TOMEKA Warner on 9/24/18.   Device type: Auto-CPAP  PAP settings: CPAP min 5.0 cm  H20     CPAP max 15.0 cm  H20    95th% pressure 10.9 cm  H20   Mask type:  Nasal Pillows  Objective measures: 14 day rolling measures      Compliance  85 %      Leak  16.56 lpm  last  upload      AHI 1.5   last  upload      Average number of minutes 425      Objective measure goal  Compliance   Goal >70%  Leak   Goal < 24 lpm  AHI  Goal < 5  Usage  Goal >240

## 2018-08-31 ENCOUNTER — OFFICE VISIT - HEALTHEAST (OUTPATIENT)
Dept: SURGERY | Facility: CLINIC | Age: 63
End: 2018-08-31

## 2018-08-31 ENCOUNTER — AMBULATORY - HEALTHEAST (OUTPATIENT)
Dept: LAB | Facility: CLINIC | Age: 63
End: 2018-08-31

## 2018-08-31 DIAGNOSIS — E11.9 DIABETES MELLITUS, TYPE 2 (H): ICD-10-CM

## 2018-08-31 DIAGNOSIS — Z98.84 BARIATRIC SURGERY STATUS: ICD-10-CM

## 2018-08-31 DIAGNOSIS — Z71.3 NUTRITIONAL COUNSELING: ICD-10-CM

## 2018-08-31 DIAGNOSIS — E66.9 OBESITY WITH BODY MASS INDEX OF 30.0-39.9: ICD-10-CM

## 2018-08-31 LAB — HBA1C MFR BLD: 9.1 % (ref 4.2–6.1)

## 2018-08-31 ASSESSMENT — MIFFLIN-ST. JEOR: SCORE: 1368.23

## 2018-09-12 ENCOUNTER — AMBULATORY - HEALTHEAST (OUTPATIENT)
Dept: SURGERY | Facility: CLINIC | Age: 63
End: 2018-09-12

## 2018-09-12 DIAGNOSIS — E78.5 HYPERLIPIDEMIA LDL GOAL <70: ICD-10-CM

## 2018-09-12 RX ORDER — PRAVASTATIN SODIUM 40 MG
TABLET ORAL
Qty: 90 TABLET | Refills: 0 | Status: SHIPPED | OUTPATIENT
Start: 2018-09-12 | End: 2018-12-14

## 2018-09-24 ENCOUNTER — OFFICE VISIT (OUTPATIENT)
Dept: SLEEP MEDICINE | Facility: CLINIC | Age: 63
End: 2018-09-24
Payer: COMMERCIAL

## 2018-09-24 VITALS
WEIGHT: 196.6 LBS | HEART RATE: 86 BPM | HEIGHT: 62 IN | OXYGEN SATURATION: 99 % | BODY MASS INDEX: 36.18 KG/M2 | DIASTOLIC BLOOD PRESSURE: 82 MMHG | SYSTOLIC BLOOD PRESSURE: 123 MMHG

## 2018-09-24 DIAGNOSIS — G47.33 OSA (OBSTRUCTIVE SLEEP APNEA): Primary | ICD-10-CM

## 2018-09-24 PROCEDURE — 99213 OFFICE O/P EST LOW 20 MIN: CPT | Performed by: PHYSICIAN ASSISTANT

## 2018-09-24 NOTE — PROGRESS NOTES
"Obstructive Sleep Apnea - PAP Follow-Up Visit:    Chief Complaint   Patient presents with     CPAP Follow Up       Kenna Colindres comes in today for follow-up of their mild sleep apnea, managed with CPAP.     Overall, she rates the experience with PAP as 8 (0 poor, 10 great). The mask is not comfortable.  The mask is uncomfortable because of \"continuing to use gel\".  The mask is not leaking.  She is not snoring with the mask on. She is not having gasp arousals.  She is having significant oral/nasal dryness. The pressure is comfortable.     Her PAP interface is Nasal Pillows.    Bedtime is typically 2300. Usually it takes about 30 min minutes to fall asleep with the mask on. Wake time is typically 0645.  Patient is using PAP therapy 7 hours per night. The patient is usually getting 6.5 hours of sleep per night.    She does not feel rested in the morning.    Total score - [place]: 5 (9/24/2018  9:00 AM)      EMMY Total Score: 15    ResMed     Auto-PAP 5.0 - 15.0 cmH2O 30 day usage data:    100% of days with > 4 hours of use. 0/30 days with no use.   Average use 477 minutes per day.   95%ile Leak 12.25 L/min.   CPAP 95% pressure 10.2 cm.   AHI 1.2 events per hour.         Past medical/surgical history, family history, social history, medications and allergies were reviewed.      Problem List:  Patient Active Problem List    Diagnosis Date Noted     MICHEAL (obstructive sleep apnea) 07/18/2018     Priority: Medium     Initial diagnosis 2003  HSAT 7/2/2018 Mild AHI 6.0 wt 194       Uncontrolled type 2 diabetes mellitus without complication, without long-term current use of insulin (H) 10/02/2016     Priority: Medium     Dyslipidemia 09/07/2016     Priority: Medium     Essential hypertension with goal blood pressure less than 140/90 08/24/2016     Priority: Medium     Hypothyroidism, unspecified type 08/24/2016     Priority: Medium     Insomnia, unspecified insomnia 12/03/2015     Priority: Medium     History of gastric bypass " "07/15/2014     Priority: Medium     In 2004. Had some complications of stenosis and obstruction problems        Postmenopausal bleeding 09/23/2013     Priority: Medium     She had a negative endometrial biopsy in 2013, see notes with Dr. Alvarado, [facility] electronic medical records        Olecranon bursitis of left elbow 08/28/2013     Priority: Medium     Chronic rhinitis 08/22/2012     Priority: Medium     Insomnia 08/22/2012     Priority: Medium     Declined medication for depression       Hiatal hernia 08/17/2012     Priority: Medium     Fibromyalgia syndrome 08/17/2012     Priority: Medium     Since 1992       RA (rheumatoid arthritis) (H) 08/17/2012     Priority: Medium     Stopped methotrexate in Jan ( was on for 17 yrs)  3yrs ago stopped prednisone ( was on for 14yrs)  Now on Enbrel: takes irregularly       BMI 36-39 08/17/2012     Priority: Medium     Advanced directives, counseling/discussion 08/14/2012     Priority: Medium     Patient states has Advance Directive and will bring in a copy to clinic. 8/14/2012 JOANN Harden MA            /82  Pulse 86  Ht 1.575 m (5' 2\")  Wt 89.2 kg (196 lb 9.6 oz)  SpO2 99%  BMI 35.96 kg/m2    Impression/Plan:     1. Mild Sleep apnea.   Tolerating PAP well. Daytime symptoms are improved.   Continue current treatment.   Comprehensive DME.     2. Chronic insomnia-  Follow up with Dr. Johnson as recommended by Dr. Cameron.     Kenna Jens will follow up in about 1 year(s).     Fifteen minutes spent with patient, all of which were spent face-to-face counseling, consulting, coordinating plan of care regarding MICHEAL and insomnia.      Margarita Mg PA-C  "

## 2018-09-24 NOTE — PATIENT INSTRUCTIONS

## 2018-09-24 NOTE — MR AVS SNAPSHOT
After Visit Summary   9/24/2018    Kenna Colindres    MRN: [id number]           Patient Information     Date Of Birth          1955        Visit Information        Provider Department      9/24/2018 9:00 AM Margarita Mg PA [facility]        Today's Diagnoses     MICHEAL (obstructive sleep apnea)    -  1      Care Instructions      Your BMI is Body mass index is 35.96 kg/(m^2).  Weight management is a personal decision.  If you are interested in exploring weight loss strategies, the following discussion covers the approaches that may be successful. Body mass index (BMI) is one way to tell whether you are at a healthy weight, overweight, or obese. It measures your weight in relation to your height.  A BMI of 18.5 to 24.9 is in the healthy range. A person with a BMI of 25 to 29.9 is considered overweight, and someone with a BMI of 30 or greater is considered obese. More than two-thirds of American adults are considered overweight or obese.  Being overweight or obese increases the risk for further weight gain. Excess weight may lead to heart disease and diabetes.  Creating and following plans for healthy eating and physical activity may help you improve your health.  Weight control is part of healthy lifestyle and includes exercise, emotional health, and healthy eating habits. Careful eating habits lifelong are the mainstay of weight control. Though there are significant health benefits from weight loss, long-term weight loss with diet alone may be very difficult to achieve- studies show long-term success with dietary management in less than 10% of people. Attaining a healthy weight may be especially difficult to achieve in those with severe obesity. In some cases, medications, devices and surgical management might be considered.  What can you do?  If you are overweight or obese and are interested in methods for weight loss, you should discuss this with your provider.     Consider  reducing daily calorie intake by 500 calories.     Keep a food journal.     Avoiding skipping meals, consider cutting portions instead.    Diet combined with exercise helps maintain muscle while optimizing fat loss. Strength training is particularly important for building and maintaining muscle mass. Exercise helps reduce stress, increase energy, and improves fitness. Increasing exercise without diet control, however, may not burn enough calories to loose weight.       Start walking three days a week 10-20 minutes at a time    Work towards walking thirty minutes five days a week     Eventually, increase the speed of your walking for 1-2 minutes at time    In addition, we recommend that you review healthy lifestyles and methods for weight loss available through the National Institutes of Health patient information sites:  http://win.niddk.nih.gov/publications/index.htm    And look into health and wellness programs that may be available through your health insurance provider, employer, local community center, or hailey club.    Weight management plan: Patient was referred to their PCP to discuss a diet and exercise plan.          Your Body mass index is 35.96 kg/(m^2).  Weight management is a personal decision.  If you are interested in exploring weight loss strategies, the following discussion covers the approaches that may be successful. Body mass index (BMI) is one way to tell whether you are at a healthy weight, overweight, or obese. It measures your weight in relation to your height.  A BMI of 18.5 to 24.9 is in the healthy range. A person with a BMI of 25 to 29.9 is considered overweight, and someone with a BMI of 30 or greater is considered obese. More than two-thirds of American adults are considered overweight or obese.  Being overweight or obese increases the risk for further weight gain. Excess weight may lead to heart disease and diabetes.  Creating and following plans for healthy eating and physical  activity may help you improve your health.  Weight control is part of healthy lifestyle and includes exercise, emotional health, and healthy eating habits. Careful eating habits lifelong are the mainstay of weight control. Though there are significant health benefits from weight loss, long-term weight loss with diet alone may be very difficult to achieve- studies show long-term success with dietary management in less than 10% of people. Attaining a healthy weight may be especially difficult to achieve in those with severe obesity. In some cases, medications, devices and surgical management might be considered.  What can you do?  If you are overweight or obese and are interested in methods for weight loss, you should discuss this with your provider.     Consider reducing daily calorie intake by 500 calories.     Keep a food journal.     Avoiding skipping meals, consider cutting portions instead.    Diet combined with exercise helps maintain muscle while optimizing fat loss. Strength training is particularly important for building and maintaining muscle mass. Exercise helps reduce stress, increase energy, and improves fitness. Increasing exercise without diet control, however, may not burn enough calories to loose weight.       Start walking three days a week 10-20 minutes at a time    Work towards walking thirty minutes five days a week     Eventually, increase the speed of your walking for 1-2 minutes at time    In addition, we recommend that you review healthy lifestyles and methods for weight loss available through the National Institutes of Health patient information sites:  http://win.niddk.nih.gov/publications/index.htm    And look into health and wellness programs that may be available through your health insurance provider, employer, local community center, or hailey club.    Weight management plan: Patient was referred to their PCP to discuss a diet and exercise plan.          Follow-ups after your visit       "  Follow-up notes from your care team     Return in 1 year (on 2019) for PAP follow up.      Who to contact     If you have questions or need follow up information about today's clinic visit or your schedule please contact [facility] directly at [phone].  Normal or non-critical lab and imaging results will be communicated to you by MyChart, letter or phone within 4 business days after the clinic has received the results. If you do not hear from us within 7 days, please contact the clinic through Kynetxhart or phone. If you have a critical or abnormal lab result, we will notify you by phone as soon as possible.  Submit refill requests through [employer] or call your pharmacy and they will forward the refill request to us. Please allow 3 business days for your refill to be completed.          Additional Information About Your Visit        MyChart Information     [employer] lets you send messages to your doctor, view your test results, renew your prescriptions, schedule appointments and more. To sign up, go to [email or web address] . Click on \"Log in\" on the left side of the screen, which will take you to the Welcome page. Then click on \"Sign up Now\" on the right side of the page.     You will be asked to enter the access code listed below, as well as some personal information. Please follow the directions to create your username and password.     Your access code is: [email or web address]  Expires: 10/25/2018 11:27 AM     Your access code will  in 90 days. If you need help or a new code, please call your [place] clinic or [phone].        Care EveryWhere ID     This is your Care EveryWhere ID. This could be used by other organizations to access your [place] medical records  [id number]        Your Vitals Were     Pulse Height Pulse Oximetry BMI (Body Mass Index)          86 1.575 m (5' 2\") 99% 35.96 kg/m2         Blood Pressure from Last 3 Encounters:   18 123/82   18 141/83 "   07/18/18 116/74    Weight from Last 3 Encounters:   09/24/18 89.2 kg (196 lb 9.6 oz)   07/24/18 88.9 kg (196 lb)   07/18/18 88 kg (194 lb)              We Performed the Following     Sleep Comprehensive DME        Primary Care Provider Office Phone # Fax #    Bienvenido Colvin [phone] [address] [facility] [address]        Equal Access to Services     DAY HECTOR : Hadii aad ku hadasho Soomaali, waaxda luqadaha, qaybta kaalmada adeegyada, waxay idiin hayaan adeeg kharash la'brenna . So [facility] [place] [facility] [phone].    ATENCIÓN: Si habla español, tiene a pérez disposición servicios gratuitos de asistencia lingüística. [facility] [phone].    We comply with applicable federal civil rights laws and [place] laws. We do not discriminate on the basis of race, color, national origin, age, disability, sex, sexual orientation, or gender identity.            Thank you!     Thank you for choosing [facility]  for your care. Our goal is always to provide you with excellent care. Hearing back from our patients is one way we can continue to improve our services. Please take a few minutes to complete the written survey that you may receive in the mail after your visit with us. Thank you!             Your Updated Medication List - Protect others around you: Learn how to safely use, store and throw away your medicines at www.disposemymeds.org.          This list is accurate as of 9/24/18  9:30 AM.  Always use your most recent med list.                   Brand Name Dispense Instructions for use Diagnosis    albuterol 108 (90 Base) MCG/ACT inhaler    PROAIR HFA/PROVENTIL HFA/VENTOLIN HFA    3 Inhaler    Inhale 2 puffs into the lungs every 6 hours as needed for shortness of breath / dyspnea or wheezing    Bronchospasm       ASPIRIN LOW DOSE 81 MG EC tablet   Generic drug:  aspirin     90 tablet    TAKE 1 TABLET DAILY    Uncontrolled diabetes mellitus type 2 without complications (H)        empagliflozin 25 MG Tabs tablet    JARDIANCE    90 tablet    Take 1 tablet (25 mg) by mouth daily    Uncontrolled type 2 diabetes mellitus without complication, without long-term current use of insulin (H)       gabapentin 300 MG capsule    NEURONTIN    270 capsule    Take 1 capsule (300 mg) by mouth 3 times daily    Fibromyalgia syndrome       levothyroxine 150 MCG tablet    SYNTHROID/LEVOTHROID    90 tablet    TAKE 1 TABLET BY MOUTH  DAILY    Hypothyroidism, unspecified type       liraglutide 18 MG/3ML soln    VICTOZA     Inject Subcutaneous daily        lisinopril 10 MG tablet    PRINIVIL/ZESTRIL    90 tablet    TAKE 1 TABLET BY MOUTH  DAILY    Essential hypertension with goal blood pressure less than 140/90       metFORMIN 500 MG 24 hr tablet    GLUCOPHAGE-XR    360 tablet    Take 2 tablets (1,000 mg) by mouth 2 times daily (with meals)    Uncontrolled diabetes mellitus type 2 without complications (H)       MULTIVITAMINS PO      Take by mouth daily        omeprazole 20 MG tablet    priLOSEC OTC     Take 20 mg by mouth daily    Hiatal hernia       pravastatin 40 MG tablet    PRAVACHOL    90 tablet    TAKE 1 TABLET BY MOUTH  DAILY    Hyperlipidemia LDL goal <70       sitagliptin 100 MG tablet    JANUVIA    90 tablet    Take 1 tablet (100 mg) by mouth daily    Uncontrolled diabetes mellitus type 2 without complications (H)       tocilizumab 162 MG/0.9ML subcutaneous injection    ACTEMRA     Every week SQ

## 2018-09-24 NOTE — NURSING NOTE
"Chief Complaint   Patient presents with     CPAP Follow Up       Initial There were no vitals taken for this visit. Estimated body mass index is 35.85 kg/(m^2) as calculated from the following:    Height as of 7/24/18: 1.575 m (5' 2\").    Weight as of 7/24/18: 88.9 kg (196 lb).    Medication Reconciliation: complete      "

## 2018-09-25 DIAGNOSIS — E03.9 HYPOTHYROIDISM, UNSPECIFIED TYPE: ICD-10-CM

## 2018-09-25 DIAGNOSIS — I10 ESSENTIAL HYPERTENSION WITH GOAL BLOOD PRESSURE LESS THAN 140/90: ICD-10-CM

## 2018-09-25 RX ORDER — LEVOTHYROXINE SODIUM 150 UG/1
TABLET ORAL
Qty: 90 TABLET | OUTPATIENT
Start: 2018-09-25

## 2018-09-25 RX ORDER — LISINOPRIL 10 MG/1
TABLET ORAL
Qty: 90 TABLET | OUTPATIENT
Start: 2018-09-25

## 2018-09-26 ENCOUNTER — AMBULATORY - HEALTHEAST (OUTPATIENT)
Dept: SURGERY | Facility: CLINIC | Age: 63
End: 2018-09-26

## 2018-09-26 ENCOUNTER — COMMUNICATION - HEALTHEAST (OUTPATIENT)
Dept: SURGERY | Facility: CLINIC | Age: 63
End: 2018-09-26

## 2018-09-27 RX ORDER — METFORMIN HCL 500 MG
TABLET, EXTENDED RELEASE 24 HR ORAL
Qty: 360 TABLET | Refills: 1 | Status: SHIPPED | OUTPATIENT
Start: 2018-09-27 | End: 2019-07-09

## 2018-10-02 ENCOUNTER — OFFICE VISIT (OUTPATIENT)
Dept: URGENT CARE | Facility: URGENT CARE | Age: 63
End: 2018-10-02
Payer: COMMERCIAL

## 2018-10-02 VITALS
TEMPERATURE: 98 F | BODY MASS INDEX: 35.3 KG/M2 | SYSTOLIC BLOOD PRESSURE: 131 MMHG | HEART RATE: 102 BPM | DIASTOLIC BLOOD PRESSURE: 72 MMHG | WEIGHT: 193 LBS

## 2018-10-02 DIAGNOSIS — R30.0 DYSURIA: Primary | ICD-10-CM

## 2018-10-02 LAB
ALBUMIN UR-MCNC: 30 MG/DL
APPEARANCE UR: ABNORMAL
BACTERIA #/AREA URNS HPF: ABNORMAL /HPF
BILIRUB UR QL STRIP: NEGATIVE
COLOR UR AUTO: YELLOW
GLUCOSE UR STRIP-MCNC: NEGATIVE MG/DL
HGB UR QL STRIP: ABNORMAL
KETONES UR STRIP-MCNC: 15 MG/DL
LEUKOCYTE ESTERASE UR QL STRIP: ABNORMAL
MUCOUS THREADS #/AREA URNS LPF: PRESENT /LPF
NITRATE UR QL: NEGATIVE
NON-SQ EPI CELLS #/AREA URNS LPF: ABNORMAL /LPF
PH UR STRIP: 5.5 PH (ref 5–7)
RBC #/AREA URNS AUTO: ABNORMAL /HPF
SOURCE: ABNORMAL
SP GR UR STRIP: >1.03 (ref 1–1.03)
UROBILINOGEN UR STRIP-ACNC: 0.2 EU/DL (ref 0.2–1)
WBC #/AREA URNS AUTO: ABNORMAL /HPF
YEAST #/AREA URNS HPF: ABNORMAL /HPF

## 2018-10-02 PROCEDURE — 99213 OFFICE O/P EST LOW 20 MIN: CPT | Performed by: PHYSICIAN ASSISTANT

## 2018-10-02 PROCEDURE — 81001 URINALYSIS AUTO W/SCOPE: CPT | Performed by: PHYSICIAN ASSISTANT

## 2018-10-02 PROCEDURE — 87086 URINE CULTURE/COLONY COUNT: CPT | Performed by: PHYSICIAN ASSISTANT

## 2018-10-02 RX ORDER — NITROFURANTOIN 25; 75 MG/1; MG/1
100 CAPSULE ORAL 2 TIMES DAILY
Qty: 14 CAPSULE | Refills: 0 | Status: SHIPPED | OUTPATIENT
Start: 2018-10-02 | End: 2018-10-09

## 2018-10-02 ASSESSMENT — ENCOUNTER SYMPTOMS
HEMOPTYSIS: 0
CARDIOVASCULAR NEGATIVE: 1
GASTROINTESTINAL NEGATIVE: 1
DYSURIA: 1
PALPITATIONS: 0
FREQUENCY: 1
FLANK PAIN: 0
EYE PAIN: 0
DIAPHORESIS: 0
CONSTITUTIONAL NEGATIVE: 1
HEMATURIA: 0
WEIGHT LOSS: 0
COUGH: 0
FEVER: 0
RESPIRATORY NEGATIVE: 1

## 2018-10-02 NOTE — PROGRESS NOTES
SUBJECTIVE:     HPI  Kenna Colindres is a 63 year old female who presents to clinic today for the following health issues:  URINARY TRACT SYMPTOMS    Duration: 3days    Description  dysuria, frequency, urgency and low back pain    Intensity:  moderate    Accompanying signs and symptoms:  Fever/chills: no   Flank pain no   Nausea and vomiting: no   Vaginal symptoms: No vaginal d/c, bleeding, and irritation.  No new partners.   Abdominal/Pelvic Pain: No abdominal pain, n/v, constipation, diarrhea, bloody or black tarry stools.  No fever, chills or sweats.    History  History of frequent UTI's: no   History of kidney stones: no   Sexually Active: no   Possibility of pregnancy: No    Precipitating or alleviating factors: None    Therapies tried and outcome: increase fluid intake   Outcome: no relief       Reviewed PMH, FMH and SOH.  Patient Active Problem List   Diagnosis     Advanced directives, counseling/discussion     Hiatal hernia     Fibromyalgia syndrome     RA (rheumatoid arthritis) (H)     BMI 36-39     Chronic rhinitis     Insomnia     Olecranon bursitis of left elbow     Postmenopausal bleeding     History of gastric bypass     Insomnia, unspecified insomnia     Essential hypertension with goal blood pressure less than 140/90     Hypothyroidism, unspecified type     Dyslipidemia     Uncontrolled type 2 diabetes mellitus without complication, without long-term current use of insulin (H)     MICHEAL (obstructive sleep apnea)     Current Outpatient Prescriptions   Medication Sig Dispense Refill     albuterol (PROAIR HFA, PROVENTIL HFA, VENTOLIN HFA) 108 (90 BASE) MCG/ACT inhaler Inhale 2 puffs into the lungs every 6 hours as needed for shortness of breath / dyspnea or wheezing 3 Inhaler 4     ASPIRIN LOW DOSE 81 MG EC tablet TAKE 1 TABLET DAILY 90 tablet 3     empagliflozin (JARDIANCE) 25 MG TABS tablet Take 1 tablet (25 mg) by mouth daily 90 tablet 3     gabapentin (NEURONTIN) 300 MG capsule Take 1 capsule (300 mg)  "by mouth 3 times daily 270 capsule 1     levothyroxine (SYNTHROID/LEVOTHROID) 150 MCG tablet TAKE 1 TABLET BY MOUTH  DAILY 90 tablet 0     liraglutide (VICTOZA) 18 MG/3ML soln Inject Subcutaneous daily       lisinopril (PRINIVIL/ZESTRIL) 10 MG tablet TAKE 1 TABLET BY MOUTH  DAILY 90 tablet 0     metFORMIN (GLUCOPHAGE-XR) 500 MG 24 hr tablet TAKE 2 TABLETS BY MOUTH TWO TIMES DAILY WITH MEALS 360 tablet 1     Multiple Vitamin (MULTIVITAMINS PO) Take by mouth daily       omeprazole (PRILOSEC OTC) 20 MG tablet Take 20 mg by mouth daily       pravastatin (PRAVACHOL) 40 MG tablet TAKE 1 TABLET BY MOUTH  DAILY 90 tablet 0     sitagliptin (JANUVIA) 100 MG tablet Take 1 tablet (100 mg) by mouth daily 90 tablet 1     tocilizumab (ACTEMRA) 162 MG/0.9ML subcutaneous injection Every week SQ       Allergies   Allergen Reactions     Metrizamide      Other reaction(s): Other (See Comments)  \"almost  on the table\"     Contrast Dye      Sulfa Drugs        Review of Systems   Constitutional: Negative.  Negative for diaphoresis, fever and weight loss.   Eyes: Negative for pain.   Respiratory: Negative.  Negative for cough and hemoptysis.    Cardiovascular: Negative.  Negative for chest pain and palpitations.   Gastrointestinal: Negative.    Genitourinary: Positive for dysuria, frequency and urgency. Negative for flank pain and hematuria.   Skin: Negative.    All other systems reviewed and are negative.      /72  Pulse 105  Temp 98  F (36.7  C) (Oral)  Wt 193 lb (87.5 kg)  BMI 35.3 kg/m2  Physical Exam   Constitutional: She is oriented to person, place, and time and well-developed, well-nourished, and in no distress. No distress.   Cardiovascular: Normal rate, regular rhythm, normal heart sounds and intact distal pulses.  Exam reveals no gallop and no friction rub.    No murmur heard.  Pulmonary/Chest: Effort normal and breath sounds normal. No respiratory distress. She has no wheezes. She has no rales.   Abdominal: Soft. " Normal appearance, normal aorta and bowel sounds are normal. She exhibits no mass. There is no hepatosplenomegaly. There is no tenderness. There is no rebound, no guarding, no CVA tenderness, no tenderness at McBurney's point and negative Angeles's sign. No hernia.   Neurological: She is alert and oriented to person, place, and time.   Skin: Skin is warm and dry.   Psychiatric: Mood, memory, affect and judgment normal.   Nursing note and vitals reviewed.        Assessment/Plan:  Dysuria: UA and micro is suspicious for UTI and will empirically treat with [id number].  Will send for UC to help guide abx treatment.  Recommend increase fluids, regular voids, proper wiping techniques and voiding after intercourse.  Educated patient on warning signs of kidney infection and to go to the ER if she develops any of these symptoms.  Recheck in clinic if symptoms worsen or if symptoms do not improve.  -     *UA reflex to Microscopic and Culture ([place] [facility] (except [place] and [place])  -     Urine Microscopic  -     Urine Culture Aerobic Bacterial  -     nitroFURantoin, macrocrystal-monohydrate, (MACROBID) 100 MG capsule; Take 1 capsule (100 mg) by mouth 2 times daily for 7 days          Ruyb See MARIA DEL ROSARIO Chavis

## 2018-10-02 NOTE — MR AVS SNAPSHOT
"              After Visit Summary   10/2/2018    Kenna Colindres    MRN: [id number]           Patient Information     Date Of Birth          1955        Visit Information        Provider Department      10/2/2018 5:35 PM Ruby Chavis PA-C [facility]        Today's Diagnoses     Dysuria    -  1       Follow-ups after your visit        Who to contact     If you have questions or need follow up information about today's clinic visit or your schedule please contact [facility] directly at [phone].  Normal or non-critical lab and imaging results will be communicated to you by Timefulhart, letter or phone within 4 business days after the clinic has received the results. If you do not hear from us within 7 days, please contact the clinic through Timefulhart or phone. If you have a critical or abnormal lab result, we will notify you by phone as soon as possible.  Submit refill requests through [employer] or call your pharmacy and they will forward the refill request to us. Please allow 3 business days for your refill to be completed.          Additional Information About Your Visit        MyChart Information     [employer] lets you send messages to your doctor, view your test results, renew your prescriptions, schedule appointments and more. To sign up, go to [email or web address] . Click on \"Log in\" on the left side of the screen, which will take you to the Welcome page. Then click on \"Sign up Now\" on the right side of the page.     You will be asked to enter the access code listed below, as well as some personal information. Please follow the directions to create your username and password.     Your access code is: [email or web address]  Expires: 10/25/2018 11:27 AM     Your access code will  in 90 days. If you need help or a new code, please call your [facility] or [phone].        Care EveryWhere ID     This is your Care EveryWhere ID. This could be used by other organizations to access " your [place] medical records  [id number]        Your Vitals Were     Pulse Temperature BMI (Body Mass Index)             105 98  F (36.7  C) (Oral) 35.3 kg/m2          Blood Pressure from Last 3 Encounters:   10/02/18 131/72   09/24/18 123/82   07/24/18 141/83    Weight from Last 3 Encounters:   10/02/18 193 lb (87.5 kg)   09/24/18 196 lb 9.6 oz (89.2 kg)   07/24/18 196 lb (88.9 kg)              We Performed the Following     *UA reflex to Microscopic and Culture ([place] [facility] (except [place] and [place])     Urine Culture Aerobic Bacterial     Urine Microscopic          Today's Medication Changes          These changes are accurate as of 10/2/18  6:15 PM.  If you have any questions, ask your nurse or doctor.               Start taking these medicines.        Dose/Directions    nitroFURantoin (macrocrystal-monohydrate) 100 MG capsule   Commonly known as:  MACROBID   Used for:  Dysuria        Dose:  100 mg   Take 1 capsule (100 mg) by mouth 2 times daily for 7 days   Quantity:  14 capsule   Refills:  0            Where to get your medicines      These medications were sent to [facility] [place] [address] [facility] [address]  [address] [place], [place] [id number]     Phone:  [phone]     nitroFURantoin (macrocrystal-monohydrate) 100 MG capsule                Primary Care Provider Office Phone # Fax #    Bienvenido Colvin [phone]       [address] [place] [id number]        Equal Access to Services     YASH HECTOR : Hadii aad ku hadasho Soomaali, waaxda luqadaha, qaybta kaalmada adeegyachasity, waxay idijuancarlos rodriguez. So [facility] [phone].    ATENCIÓN: Si habla español, tiene a pérez disposición servicios gratuitos de asistencia lingüística. Llame al [phone].    We comply with applicable federal civil rights laws and [place] laws. We do not discriminate on the basis of race, color, national origin, age, disability, sex,  sexual orientation, or gender identity.            Thank you!     Thank you for choosing [facility]  [facility]. Our goal is always to provide you with excellent care. Hearing back from our patients is one way we can continue to improve our services. Please take a few minutes to complete the written survey that you may receive in the mail after your visit with us. Thank you!             Your Updated Medication List - Protect others around you: Learn how to safely use, store and throw away your medicines at www.disposemymeds.org.          This list is accurate as of 10/2/18  6:15 PM.  Always use your most recent med list.                   Brand Name Dispense Instructions for use Diagnosis    albuterol 108 (90 Base) MCG/ACT inhaler    PROAIR HFA/PROVENTIL HFA/VENTOLIN HFA    3 Inhaler    Inhale 2 puffs into the lungs every 6 hours as needed for shortness of breath / dyspnea or wheezing    Bronchospasm       ASPIRIN LOW DOSE 81 MG EC tablet   Generic drug:  aspirin     90 tablet    TAKE 1 TABLET DAILY    Uncontrolled diabetes mellitus type 2 without complications (H)       empagliflozin 25 MG Tabs tablet    JARDIANCE    90 tablet    Take 1 tablet (25 mg) by mouth daily    Uncontrolled type 2 diabetes mellitus without complication, without long-term current use of insulin (H)       gabapentin 300 MG capsule    NEURONTIN    270 capsule    Take 1 capsule (300 mg) by mouth 3 times daily    Fibromyalgia syndrome       levothyroxine 150 MCG tablet    SYNTHROID/LEVOTHROID    90 tablet    TAKE 1 TABLET BY MOUTH  DAILY    Hypothyroidism, unspecified type       liraglutide 18 MG/3ML soln    VICTOZA     Inject Subcutaneous daily        lisinopril 10 MG tablet    PRINIVIL/ZESTRIL    90 tablet    TAKE 1 TABLET BY MOUTH  DAILY    Essential hypertension with goal blood pressure less than 140/90       metFORMIN 500 MG 24 hr tablet    GLUCOPHAGE-XR    360 tablet    TAKE 2 TABLETS BY MOUTH TWO TIMES DAILY WITH MEALS     Uncontrolled diabetes mellitus type 2 without complications (H)       MULTIVITAMINS PO      Take by mouth daily        nitroFURantoin (macrocrystal-monohydrate) 100 MG capsule    MACROBID    14 capsule    Take 1 capsule (100 mg) by mouth 2 times daily for 7 days    Dysuria       omeprazole 20 MG tablet    priLOSEC OTC     Take 20 mg by mouth daily    Hiatal hernia       pravastatin 40 MG tablet    PRAVACHOL    90 tablet    TAKE 1 TABLET BY MOUTH  DAILY    Hyperlipidemia LDL goal <70       sitagliptin 100 MG tablet    JANUVIA    90 tablet    Take 1 tablet (100 mg) by mouth daily    Uncontrolled diabetes mellitus type 2 without complications (H)       tocilizumab 162 MG/0.9ML subcutaneous injection    ACTEMRA     Every week SQ

## 2018-10-03 ENCOUNTER — TELEPHONE (OUTPATIENT)
Dept: URGENT CARE | Facility: URGENT CARE | Age: 63
End: 2018-10-03

## 2018-10-03 DIAGNOSIS — R31.29 MICROSCOPIC HEMATURIA: Primary | ICD-10-CM

## 2018-10-03 LAB
BACTERIA SPEC CULT: NO GROWTH
SPECIMEN SOURCE: NORMAL

## 2018-10-04 NOTE — TELEPHONE ENCOUNTER
Please call to inform patient her urine did not grow any bacteria.  Her urine did have white cells and blood in it and was abnormal.  If she is feeling better that is great.  However I would still recommend a Urology follow up or a primary care provider follow up.   Sooner if her symptoms persist. If symptoms improved then at her convenience recommend within a month if symptoms are improved.   Concern about the blood in her urine so it might be best to follow up with Urology  Referral done. Please help patient schedule an appointment or give the numbers to call.   If with fevers vomiting back pain please go to the ER could be a sign of kidney infection or kidney stone and the ER would be the best place to go especially with blood in urine.  Thanks  Livia Small M.D.

## 2018-10-05 NOTE — TELEPHONE ENCOUNTER
Spoke with patient and gave her the phone numbers per the referral that Dr. Small placed.  She states she is felling better and will follow up with Urology or her primary care provider at her convenience.  Patient agreed.    Kasia Rodriguez MA

## 2018-10-29 ENCOUNTER — TRANSFERRED RECORDS (OUTPATIENT)
Dept: HEALTH INFORMATION MANAGEMENT | Facility: CLINIC | Age: 63
End: 2018-10-29

## 2018-10-29 LAB
ALT SERPL-CCNC: 67 IU/L (ref 8–45)
AST SERPL-CCNC: 33 IU/L (ref 2–40)
CHOLEST SERPL-MCNC: 160 MG/DL (ref 100–199)
CREAT SERPL-MCNC: 0.78 MG/DL (ref 0.57–1.11)
GFR SERPL CREATININE-BSD FRML MDRD: >60 ML/MIN/1.73M2
HDLC SERPL-MCNC: 43 MG/DL
LDLC SERPL CALC-MCNC: 96 MG/DL
NONHDLC SERPL-MCNC: 117 MG/DL
TRIGL SERPL-MCNC: 107 MG/DL

## 2018-11-06 ENCOUNTER — COMMUNICATION - HEALTHEAST (OUTPATIENT)
Dept: TELEHEALTH | Facility: CLINIC | Age: 63
End: 2018-11-06

## 2018-11-06 ENCOUNTER — OFFICE VISIT - HEALTHEAST (OUTPATIENT)
Dept: SURGERY | Facility: CLINIC | Age: 63
End: 2018-11-06

## 2018-11-06 DIAGNOSIS — Z98.84 BARIATRIC SURGERY STATUS: ICD-10-CM

## 2018-11-06 DIAGNOSIS — G47.33 OBSTRUCTIVE SLEEP APNEA SYNDROME: ICD-10-CM

## 2018-11-06 DIAGNOSIS — E11.65 POORLY CONTROLLED TYPE 2 DIABETES MELLITUS (H): ICD-10-CM

## 2018-11-06 DIAGNOSIS — K21.9 GASTROESOPHAGEAL REFLUX DISEASE WITHOUT ESOPHAGITIS: ICD-10-CM

## 2018-11-06 DIAGNOSIS — E66.01 MORBID OBESITY (H): ICD-10-CM

## 2018-11-06 ASSESSMENT — MIFFLIN-ST. JEOR: SCORE: 1350.08

## 2018-11-15 ENCOUNTER — COMMUNICATION - HEALTHEAST (OUTPATIENT)
Dept: SURGERY | Facility: CLINIC | Age: 63
End: 2018-11-15

## 2018-11-15 ENCOUNTER — AMBULATORY - HEALTHEAST (OUTPATIENT)
Dept: SURGERY | Facility: CLINIC | Age: 63
End: 2018-11-15

## 2018-11-26 DIAGNOSIS — I10 ESSENTIAL HYPERTENSION WITH GOAL BLOOD PRESSURE LESS THAN 140/90: ICD-10-CM

## 2018-11-26 DIAGNOSIS — E03.9 HYPOTHYROIDISM, UNSPECIFIED TYPE: ICD-10-CM

## 2018-11-26 NOTE — LETTER
November 28, 2018    Kenna Colindres  [address] [place] [id number]    Dear Kenna,       We recently received a refill request for levothyroxine and lisinopril.  We have refilled this for a one time 30 day supply only because you are due for a:    Blood pressure, cholesterol and thyroid office visit and fasting lab appointment      Please schedule this lab appointment 4-5 days prior to the office visit.     Please call at your earliest convenience so that there will not be a delay with your future refills.          Thank you,   Your [facility] Team/  [phone]

## 2018-11-28 RX ORDER — LISINOPRIL 10 MG/1
TABLET ORAL
Qty: 30 TABLET | Refills: 0 | Status: SHIPPED | OUTPATIENT
Start: 2018-11-28 | End: 2018-12-10

## 2018-11-28 RX ORDER — LEVOTHYROXINE SODIUM 150 UG/1
TABLET ORAL
Qty: 30 TABLET | Refills: 0 | Status: SHIPPED | OUTPATIENT
Start: 2018-11-28 | End: 2018-12-10

## 2018-11-28 NOTE — TELEPHONE ENCOUNTER
Medication is being filled for 1 time refill only due to:  Patient needs to be seen for fasting lab appointment and appointment with the provider for further refills. Hypertension, cholesterol, and thyroid.  Randi COELLON, RN

## 2018-11-30 ENCOUNTER — DOCUMENTATION ONLY (OUTPATIENT)
Dept: LAB | Facility: CLINIC | Age: 63
End: 2018-11-30

## 2018-11-30 DIAGNOSIS — E78.5 DYSLIPIDEMIA: ICD-10-CM

## 2018-11-30 DIAGNOSIS — I10 ESSENTIAL HYPERTENSION WITH GOAL BLOOD PRESSURE LESS THAN 140/90: Primary | Chronic | ICD-10-CM

## 2018-11-30 DIAGNOSIS — E03.9 HYPOTHYROIDISM, UNSPECIFIED TYPE: Chronic | ICD-10-CM

## 2018-11-30 NOTE — PROGRESS NOTES
Please review lab orders sign and close encounter. Kaylin Ashby MA/ROB    BP, Chol and TSh appt 12/14/18

## 2018-12-06 ENCOUNTER — TELEPHONE (OUTPATIENT)
Dept: FAMILY MEDICINE | Facility: CLINIC | Age: 63
End: 2018-12-06

## 2018-12-07 DIAGNOSIS — I10 ESSENTIAL HYPERTENSION WITH GOAL BLOOD PRESSURE LESS THAN 140/90: Chronic | ICD-10-CM

## 2018-12-07 DIAGNOSIS — E78.5 DYSLIPIDEMIA: ICD-10-CM

## 2018-12-07 DIAGNOSIS — E03.9 HYPOTHYROIDISM, UNSPECIFIED TYPE: Chronic | ICD-10-CM

## 2018-12-07 LAB
ANION GAP SERPL CALCULATED.3IONS-SCNC: 6 MMOL/L (ref 3–14)
BUN SERPL-MCNC: 15 MG/DL (ref 7–30)
CALCIUM SERPL-MCNC: 8.7 MG/DL (ref 8.5–10.1)
CHLORIDE SERPL-SCNC: 105 MMOL/L (ref 94–109)
CO2 SERPL-SCNC: 27 MMOL/L (ref 20–32)
CREAT SERPL-MCNC: 0.72 MG/DL (ref 0.52–1.04)
CREAT UR-MCNC: 50 MG/DL
GFR SERPL CREATININE-BSD FRML MDRD: 81 ML/MIN/1.7M2
GLUCOSE SERPL-MCNC: 146 MG/DL (ref 70–99)
HBA1C MFR BLD: 6.9 % (ref 0–5.6)
MICROALBUMIN UR-MCNC: <5 MG/L
MICROALBUMIN/CREAT UR: NORMAL MG/G CR (ref 0–25)
POTASSIUM SERPL-SCNC: 4.6 MMOL/L (ref 3.4–5.3)
SODIUM SERPL-SCNC: 138 MMOL/L (ref 133–144)
T4 FREE SERPL-MCNC: 1.18 NG/DL (ref 0.76–1.46)
TSH SERPL DL<=0.005 MIU/L-ACNC: 0.37 MU/L (ref 0.4–4)

## 2018-12-07 PROCEDURE — 80048 BASIC METABOLIC PNL TOTAL CA: CPT | Performed by: FAMILY MEDICINE

## 2018-12-07 PROCEDURE — 83036 HEMOGLOBIN GLYCOSYLATED A1C: CPT | Performed by: FAMILY MEDICINE

## 2018-12-07 PROCEDURE — 82043 UR ALBUMIN QUANTITATIVE: CPT | Performed by: FAMILY MEDICINE

## 2018-12-07 PROCEDURE — 84443 ASSAY THYROID STIM HORMONE: CPT | Performed by: FAMILY MEDICINE

## 2018-12-07 PROCEDURE — 84439 ASSAY OF FREE THYROXINE: CPT | Performed by: FAMILY MEDICINE

## 2018-12-07 PROCEDURE — 36415 COLL VENOUS BLD VENIPUNCTURE: CPT | Performed by: FAMILY MEDICINE

## 2018-12-10 DIAGNOSIS — E03.9 HYPOTHYROIDISM, UNSPECIFIED TYPE: ICD-10-CM

## 2018-12-10 DIAGNOSIS — I10 ESSENTIAL HYPERTENSION WITH GOAL BLOOD PRESSURE LESS THAN 140/90: ICD-10-CM

## 2018-12-12 RX ORDER — LEVOTHYROXINE SODIUM 150 UG/1
150 TABLET ORAL DAILY
Qty: 30 TABLET | Refills: 0 | Status: SHIPPED | OUTPATIENT
Start: 2018-12-12 | End: 2018-12-14

## 2018-12-12 RX ORDER — LISINOPRIL 10 MG/1
10 TABLET ORAL DAILY
Qty: 30 TABLET | Refills: 0 | Status: SHIPPED | OUTPATIENT
Start: 2018-12-12 | End: 2018-12-14

## 2018-12-14 ENCOUNTER — OFFICE VISIT (OUTPATIENT)
Dept: FAMILY MEDICINE | Facility: CLINIC | Age: 63
End: 2018-12-14
Payer: COMMERCIAL

## 2018-12-14 VITALS
HEART RATE: 103 BPM | OXYGEN SATURATION: 99 % | WEIGHT: 192 LBS | SYSTOLIC BLOOD PRESSURE: 128 MMHG | BODY MASS INDEX: 35.12 KG/M2 | DIASTOLIC BLOOD PRESSURE: 70 MMHG | TEMPERATURE: 98.2 F

## 2018-12-14 DIAGNOSIS — E78.5 DYSLIPIDEMIA: ICD-10-CM

## 2018-12-14 DIAGNOSIS — I10 ESSENTIAL HYPERTENSION WITH GOAL BLOOD PRESSURE LESS THAN 140/90: ICD-10-CM

## 2018-12-14 DIAGNOSIS — E03.9 HYPOTHYROIDISM, UNSPECIFIED TYPE: ICD-10-CM

## 2018-12-14 DIAGNOSIS — E11.9 TYPE 2 DIABETES MELLITUS WITHOUT COMPLICATION, WITHOUT LONG-TERM CURRENT USE OF INSULIN (H): Primary | ICD-10-CM

## 2018-12-14 PROCEDURE — 99214 OFFICE O/P EST MOD 30 MIN: CPT | Performed by: FAMILY MEDICINE

## 2018-12-14 PROCEDURE — 99207 C FOOT EXAM  NO CHARGE: CPT | Performed by: FAMILY MEDICINE

## 2018-12-14 RX ORDER — LEVOTHYROXINE SODIUM 150 UG/1
150 TABLET ORAL DAILY
Qty: 90 TABLET | Refills: 3 | Status: SHIPPED | OUTPATIENT
Start: 2018-12-14 | End: 2019-05-09

## 2018-12-14 RX ORDER — PRAVASTATIN SODIUM 40 MG
40 TABLET ORAL DAILY
Qty: 90 TABLET | Refills: 3 | Status: SHIPPED | OUTPATIENT
Start: 2018-12-14 | End: 2019-09-16

## 2018-12-14 RX ORDER — LISINOPRIL 10 MG/1
10 TABLET ORAL DAILY
Qty: 90 TABLET | Refills: 3 | Status: SHIPPED | OUTPATIENT
Start: 2018-12-14 | End: 2019-05-09

## 2018-12-14 NOTE — PROGRESS NOTES
"HPI:    Kenna is a 63 year old female here to follow-up on a multitude of issues:    Please note, compliance continues to be an issue - For example she previously would not not check her glucose levels, doesn't believe the dx of diabetes, she would not get PAP in addition to other items below.  Evaluation and treatment:    Her bariatric surgeon has given her conditions before she can get surgery.   This has motivated her and she has made a number of changes.    Previous chest pain - She says this has not recurred. From previous notes \"she gives the history kind of as a \"by the way.\" She also minimizes the situation and is vague with the history. The best I can put together, she has had 2 episodes in the last few weeks of left sided chest pain, going in to the left shoulder, accompanied by shortness of breath. No nausea or diaphoresis. These episodes occurred while walking and stopped at rest. They have not occurred before or since. Although her description is vague, I think this is concerning for ACS. I chided her for not going to ED or calling 911 and gave her strict instructions for the future. For now I explained she needs an EKG, stress test and/or cardiology consult. She says the symptoms have gone away and the only reason she informed me was to \"just let you know.\" She declined any further instructions despite my insistence. In the end she was getting a bit irritated with me so I told her she has a right to refuse. She said she will go to ED if this happens again.\"    DM2, please note she is ok with the phrase \"high sugars\" but does not want the label diabetes (without retinopathy, without nephropathy, without nephropathy) - Dx'd around before 2012. Checks at home twice per day around 130-160. Denies symptoms of hypoglycemia.  Evaluation and treatment:   Eye exam - per patient normal at [facility] - around 4/10/18.   Foot exam normal 12/14/18   Urine albumin negative 12/7/18   Metformin 1000 mg bid - no " side effects   Januvia 100 mg qd - no side effects.   Jardiance 25 mg qd - no side effects. Discussed risk of ketoacidosis and warning signs. She verbalized understanding. She ran out recently.   Trulicity - never tried this?   Victoza 1.8 mg daily - no side effects.   Continue current tx.   She is planning to do bariatric surgery.   Counseling: about insulin resistance, in relation to excessive weight, diet and exercise. Also about checking glucose levels regularly and adherence to prescribed medications, symptoms of  hypoglycemia and how to correct this by taking sugar pills, juice, regular pop or milk. Discussed about proper foot care including wearing well fitting, well padded shoes.   Today we checked urine albumin (normal), A1c and glucose (high)   I called her back and discussed the importance of controlling her diabetes and intensifying her treatment - she would not hear of it!   In fact she told me she is thinking about stopping her medications - again I told her this is a her right!    Lab Results   Component Value Date    A1C 6.9 12/07/2018    A1C 10.4 05/07/2018    A1C 10.3 09/27/2017    A1C 9.8 03/29/2017    A1C 8.6 09/26/2016     HTN - does not check BP at home. Controlled in clinic.  Evaluation and treatment:    Lisinopril 10 mg qd. No side effects.   Continue same treatment.    BP Readings from Last 6 Encounters:   12/14/18 128/70   10/02/18 131/72   09/24/18 123/82   07/24/18 141/83   07/18/18 116/74   06/27/18 103/70     Last Comprehensive Metabolic Panel:  Sodium   Date Value Ref Range Status   12/07/2018 138 133 - 144 mmol/L Final     Potassium   Date Value Ref Range Status   12/07/2018 4.6 3.4 - 5.3 mmol/L Final     Chloride   Date Value Ref Range Status   12/07/2018 105 94 - 109 mmol/L Final     Carbon Dioxide   Date Value Ref Range Status   12/07/2018 27 20 - 32 mmol/L Final     Anion Gap   Date Value Ref Range Status   12/07/2018 6 3 - 14 mmol/L Final     Glucose   Date Value Ref Range Status    12/07/2018 146 (H) 70 - 99 mg/dL Final     Urea Nitrogen   Date Value Ref Range Status   12/07/2018 15 7 - 30 mg/dL Final     Creatinine   Date Value Ref Range Status   12/07/2018 0.72 0.52 - 1.04 mg/dL Final     GFR Estimate   Date Value Ref Range Status   12/07/2018 81 >60 mL/min/1.7m2 Final     Comment:     Non  GFR Calc     Calcium   Date Value Ref Range Status   12/07/2018 8.7 8.5 - 10.1 mg/dL Final       CBC RESULTS:   Recent Labs   Lab Test 03/27/17  1055   WBC 6.0   RBC 4.84   HGB 13.8   HCT 41.7   MCV 86   MCH 28.5   MCHC 33.1   RDW 13.8        Dyslipidemia - no h/o vascular disease like CAD, PAD, CVA. But has h/o diabetes.   Evaluation and treatment:    Per ATP 4 moderate to high intensity statin recommended.   Pravachol 40 mg qd - no side effects.   Continue same treatment although I am not sure if she is taking this regularly.     Recent Labs   Lab Test 09/27/17  0912 03/29/17  0850  10/19/15  0801 07/05/14  0933   CHOL 185 234*   < > 231* 189   HDL 43* 48*   < > 41* 41*   * 150*   < > 158* 126   TRIG 203* 178*   < > 161* 106   CHOLHDLRATIO  --   --   --  5.6* 4.6    < > = values in this interval not displayed.                                                        Hypothyroid - denies thyroid type symptoms like temperature intolerance.  Evaluation and treatment:    Synthroid 150 mg qd.   Continue same treatment.    TSH   Date Value Ref Range Status   12/07/2018 0.37 (L) 0.40 - 4.00 mU/L Final     T4 Free   Date Value Ref Range Status   12/07/2018 1.18 0.76 - 1.46 ng/dL Final       Obesity/sleep apnea - uses CPAP.   Evaluation and treatment:    Follows with sleep specialist.   Has had lap band around 2004.    She is planning to do Lexi-En-Y.    Wt Readings from Last 5 Encounters:   12/14/18 87.1 kg (192 lb)   10/02/18 87.5 kg (193 lb)   09/24/18 89.2 kg (196 lb 9.6 oz)   07/24/18 88.9 kg (196 lb)   07/18/18 88 kg (194 lb)       Bilateral shoulder pain - her rheumatologist  suggested injection. I previously referred her to ortho but she did not go.     RA/fibromyalgia - follows with rheumatology.    Insomnia and restless legs - Sleeps about 4 hours per night. Has hard time falling asleep and staying asleep. Also restless legs at night. Walking around helps.   Treatment:   Aspirin and milk help   Trazodone 150 mg prn somewhat helped but not much.   Melatonin no help   Mirapex - not sure if it helps    Allergies - symptoms are red eyes, itchy eyes and tearing. She tried OTC benadryl and eye drops which help. Mild nasal drainage. No sneezing or coughing. She denies pets at home.    Preventive:    Flu shot: declines  Pneumovax: declines  Prevnar: declines  Shingles: declines    Immunization History   Administered Date(s) Administered     Influenza (IIV3) PF 11/12/2009     TDAP Vaccine (Adacel) 01/01/2008     Mammogram: negative 12/18/17.     PAP:     Lab Results   Component Value Date    PAP NIL 10/09/2017        I reviewed records with her - she had post menopausal bleeding which was found to be due to polyp - removed in the Hospital - benign path.   No further bleeding.    Colon CA screen: Colonoscopy 8/14/09 per patient - negative.    Hep C screen: negative 8/14/13    Advanced Directive: referred previously.      ROS:    Const: No fevers, weight changes or night sweats recently.  ENT: No sore throat or ear pain.  Resp: No cough or shortness of breath.  CV: No dizziness or cardiac palpitations.  GI: No nausea, vomiting, diarrhea or constipation. Denies blood in stools or black stools.  : No dysuria, frequency or hematuria.    SH:    Marital status:   Kids: 2  Employment: works at [employer], physical job  Exercise: active in general.  Tobacco: no  Etoh: rarely  Recreational drugs: no  Caffeine:     Exam:    /70 (Cuff Size: Adult Large)   Pulse 103   Temp 98.2  F (36.8  C) (Oral)   Wt 87.1 kg (192 lb)   SpO2 99%   BMI 35.12 kg/m      Gen: Healthy appearing female in  no acute distress  ENT: Oropharynx normal. Oral mucosa moist without lesions.  Eyes: Conjunctiva and sclera normal. Pupils react normally to light. No nystagmus.  Neck: No enlarged lymph nodes, thyromegally or other masses.  Lungs: Good air movement and otherwise clear.  CV: Heart RRR with no murmurs. No JVD, carotid bruits or leg edema.  Foot exam: Both feet appear normal by inspection. No calluses or other lesions. DP pluses are normal. The tips of the toes are warm with good capillary refill. Sensation intact per monofilament.     Assessment and Plan - Decision Making    1. Type 2 diabetes mellitus without complication, without long-term current use of insulin (H)    Per HPI    - C FOOT EXAM  NO CHARGE    2. Essential hypertension with goal blood pressure less than 140/90    Per HPI    - lisinopril (PRINIVIL/ZESTRIL) 10 MG tablet; Take 1 tablet (10 mg) by mouth daily  Dispense: 90 tablet; Refill: 3    3. Dyslipidemia    Per HPI    - pravastatin (PRAVACHOL) 40 MG tablet; Take 1 tablet (40 mg) by mouth daily  Dispense: 90 tablet; Refill: 3    4. Hypothyroidism, unspecified type    Per HPI    - levothyroxine (SYNTHROID/LEVOTHROID) 150 MCG tablet; Take 1 tablet (150 mcg) by mouth daily  Dispense: 90 tablet; Refill: 3      Written instructions given as follows:    Patient Instructions   1. Good job on your high blood sugar!    2. See you in 6 months with fasting labs. You will likely need pre-op clearance before your gastric bypass.

## 2018-12-14 NOTE — PATIENT INSTRUCTIONS
1. Good job on your high blood sugar!    2. See you in 6 months with fasting labs. You will likely need pre-op clearance before your gastric bypass.

## 2018-12-14 NOTE — NURSING NOTE
"Chief Complaint   Patient presents with     Hypertension     Diabetes     Thyroid Problem       Initial BP (!) 137/91 (Cuff Size: Adult Large)   Pulse 103   Temp 98.2  F (36.8  C) (Oral)   Wt 87.1 kg (192 lb)   SpO2 99%   BMI 35.12 kg/m   Estimated body mass index is 35.12 kg/m  as calculated from the following:    Height as of 9/24/18: 1.575 m (5' 2\").    Weight as of this encounter: 87.1 kg (192 lb).      Veronika Rollins LPN    "

## 2018-12-16 PROBLEM — E11.9 TYPE 2 DIABETES MELLITUS WITHOUT COMPLICATION, WITHOUT LONG-TERM CURRENT USE OF INSULIN (H): Status: ACTIVE | Noted: 2018-12-16

## 2019-01-25 ENCOUNTER — DOCUMENTATION ONLY (OUTPATIENT)
Dept: SLEEP MEDICINE | Facility: CLINIC | Age: 64
End: 2019-01-25

## 2019-01-25 NOTE — PROGRESS NOTES
6 month STM    STM Recheck Visit     Diagnostic AHI:  6 HST    Data only recheck     Assessment: Pt meeting objective benchmarks.   Action plan:  pt to follow up per provider request       Device type: Auto-CPAP  PAP settings: CPAP min 5.0 cm  H20     CPAP max 15.0 cm  H20    95th% pressure 9.5 cm  H20   Objective measures: 14 day rolling measures      Compliance  100 %      Leak  10.88 lpm  last  upload      AHI 0.74   last  upload      Average number of minutes 525      Objective measure goal  Compliance   Goal >70%  Leak   Goal < 24 lpm  AHI  Goal < 5  Usage  Goal >240

## 2019-05-06 ENCOUNTER — COMMUNICATION - HEALTHEAST (OUTPATIENT)
Dept: SURGERY | Facility: CLINIC | Age: 64
End: 2019-05-06

## 2019-05-06 DIAGNOSIS — E11.9 TYPE 2 DIABETES, HBA1C GOAL < 8% (H): ICD-10-CM

## 2019-05-07 ENCOUNTER — TRANSFERRED RECORDS (OUTPATIENT)
Dept: HEALTH INFORMATION MANAGEMENT | Facility: CLINIC | Age: 64
End: 2019-05-07

## 2019-05-08 NOTE — PROGRESS NOTES
Discuss goals of appointment and review preventative healthcare maintenance goals including human immunodeficiency virus ( HIV ) screening , ShingRx vaccination against herpes zoster, Medicare Annual Wellness Visit, tdap, PHQ-2 depression survey questions, eye exams, pneumococcal vaccine 23-valent ?    Immunization History   Administered Date(s) Administered     Influenza (IIV3) PF 11/12/2009     TDAP Vaccine (Adacel) 01/01/2008

## 2019-05-09 ENCOUNTER — OFFICE VISIT (OUTPATIENT)
Dept: FAMILY MEDICINE | Facility: CLINIC | Age: 64
End: 2019-05-09
Payer: MEDICARE

## 2019-05-09 VITALS
RESPIRATION RATE: 18 BRPM | HEIGHT: 62 IN | BODY MASS INDEX: 36.51 KG/M2 | OXYGEN SATURATION: 100 % | DIASTOLIC BLOOD PRESSURE: 72 MMHG | HEART RATE: 86 BPM | WEIGHT: 198.4 LBS | TEMPERATURE: 96 F | SYSTOLIC BLOOD PRESSURE: 108 MMHG

## 2019-05-09 DIAGNOSIS — M06.9 RHEUMATOID ARTHRITIS INVOLVING MULTIPLE SITES, UNSPECIFIED RHEUMATOID FACTOR PRESENCE: ICD-10-CM

## 2019-05-09 DIAGNOSIS — E66.01 MORBID OBESITY (H): ICD-10-CM

## 2019-05-09 DIAGNOSIS — Z11.4 ENCOUNTER FOR SCREENING FOR HIV: ICD-10-CM

## 2019-05-09 DIAGNOSIS — E78.5 HYPERLIPIDEMIA LDL GOAL <100: ICD-10-CM

## 2019-05-09 DIAGNOSIS — I10 ESSENTIAL HYPERTENSION WITH GOAL BLOOD PRESSURE LESS THAN 140/90: ICD-10-CM

## 2019-05-09 DIAGNOSIS — E03.9 HYPOTHYROIDISM, UNSPECIFIED TYPE: ICD-10-CM

## 2019-05-09 DIAGNOSIS — M79.7 FIBROMYALGIA SYNDROME: ICD-10-CM

## 2019-05-09 LAB
ANION GAP SERPL CALCULATED.3IONS-SCNC: 12 MMOL/L (ref 3–14)
BUN SERPL-MCNC: 15 MG/DL (ref 7–30)
CALCIUM SERPL-MCNC: 9.9 MG/DL (ref 8.5–10.1)
CHLORIDE SERPL-SCNC: 104 MMOL/L (ref 94–109)
CHOLEST SERPL-MCNC: 193 MG/DL
CO2 SERPL-SCNC: 24 MMOL/L (ref 20–32)
CREAT SERPL-MCNC: 0.78 MG/DL (ref 0.52–1.04)
GFR SERPL CREATININE-BSD FRML MDRD: 79 ML/MIN/{1.73_M2}
GLUCOSE SERPL-MCNC: 101 MG/DL (ref 70–99)
HBA1C MFR BLD: 6.9 % (ref 0–5.6)
HDLC SERPL-MCNC: 58 MG/DL
LDLC SERPL CALC-MCNC: 102 MG/DL
NONHDLC SERPL-MCNC: 135 MG/DL
POTASSIUM SERPL-SCNC: 4.6 MMOL/L (ref 3.4–5.3)
SODIUM SERPL-SCNC: 140 MMOL/L (ref 133–144)
T4 FREE SERPL-MCNC: 0.39 NG/DL (ref 0.76–1.46)
TRIGL SERPL-MCNC: 163 MG/DL
TSH SERPL DL<=0.005 MIU/L-ACNC: 30.43 MU/L (ref 0.4–4)

## 2019-05-09 PROCEDURE — 36415 COLL VENOUS BLD VENIPUNCTURE: CPT | Performed by: INTERNAL MEDICINE

## 2019-05-09 PROCEDURE — 87389 HIV-1 AG W/HIV-1&-2 AB AG IA: CPT | Performed by: INTERNAL MEDICINE

## 2019-05-09 PROCEDURE — 83036 HEMOGLOBIN GLYCOSYLATED A1C: CPT | Performed by: INTERNAL MEDICINE

## 2019-05-09 PROCEDURE — 80061 LIPID PANEL: CPT | Performed by: INTERNAL MEDICINE

## 2019-05-09 PROCEDURE — 84439 ASSAY OF FREE THYROXINE: CPT | Performed by: INTERNAL MEDICINE

## 2019-05-09 PROCEDURE — 84443 ASSAY THYROID STIM HORMONE: CPT | Performed by: INTERNAL MEDICINE

## 2019-05-09 PROCEDURE — 99214 OFFICE O/P EST MOD 30 MIN: CPT | Performed by: INTERNAL MEDICINE

## 2019-05-09 PROCEDURE — 80048 BASIC METABOLIC PNL TOTAL CA: CPT | Performed by: INTERNAL MEDICINE

## 2019-05-09 RX ORDER — PILOCARPINE HYDROCHLORIDE 5 MG/1
5 TABLET, FILM COATED ORAL
COMMUNITY
Start: 2018-10-29

## 2019-05-09 RX ORDER — LEVOTHYROXINE SODIUM 150 UG/1
150 TABLET ORAL DAILY
Qty: 90 TABLET | Refills: 3 | Status: SHIPPED | OUTPATIENT
Start: 2019-05-09 | End: 2019-05-10

## 2019-05-09 RX ORDER — LISINOPRIL 10 MG/1
10 TABLET ORAL DAILY
Qty: 90 TABLET | Refills: 3 | Status: SHIPPED | OUTPATIENT
Start: 2019-05-09 | End: 2019-05-24

## 2019-05-09 RX ORDER — TIZANIDINE 2 MG/1
TABLET ORAL
COMMUNITY
Start: 2019-05-07 | End: 2019-09-16

## 2019-05-09 ASSESSMENT — MIFFLIN-ST. JEOR: SCORE: 1398.19

## 2019-05-09 NOTE — PROGRESS NOTES
SUBJECTIVE:   Kenna Colindres is a 64 year old female who presents to clinic today for the following   health issues:       Uncontrolled type 2 diabetes mellitus without complication, without long-term current use of insulin (H)  Hypothyroidism, unspecified type  Essential hypertension with goal blood pressure less than 140/90  Hyperlipidemia LDL goal <100  Rheumatoid arthritis involving multiple sites, unspecified rheumatoid factor presence (H)  Morbid obesity (H)  Fibromyalgia syndrome  Encounter for screening for HIV     Today patient is seeking to re-establish primary care with me. I have been seeing this patient on and off for 25 years and we spent a good bit of time reviewing the basic issues . I have also seen her daughter,  and son-in-law for primary care     Discuss goals of appointment and review preventative healthcare maintenance goals including human immunodeficiency virus ( HIV ) screening , ShingRx vaccination against herpes zoster, Medicare Annual Wellness Visit, tdap, PHQ-2 depression survey questions, eye exams, pneumococcal vaccine 23-valent ?          Immunization History   Administered Date(s) Administered     Influenza (IIV3) PF 11/12/2009     TDAP Vaccine (Adacel) 01/01/2008     Kenna is a patient that I met many years ago and has been following with Dr. Ring with Rheumatology for rheumatoid arthritis. She plans to follow with me for primary health concerns.     Diabetes Mellitus Type 2   She is taking Victoza and at the 1.8 dose. Januvia, jardiance and metformin. She says that she will be having issues with getting these medications covered with new insurance. We will see how her diabetes control changes with planned upcoming Gastric procedure. She says a redo is planned, a Lexi-en-y gastric bypass procedure.     Lab Results   Component Value Date    A1C 6.9 12/07/2018    A1C 10.4 05/07/2018    A1C 10.3 09/27/2017    A1C 9.8 03/29/2017    A1C 8.6 09/26/2016     GI  Her original  Gastric Bypass surgery was in . She plans to have a follow-up surgery as complications have arisen more recently.     Additional Information   Discussed Tdap, pneumococcal vaccine 23-valent. New shingrix vaccine discussed with patient today. Patient declines vaccinations today. We agreed to postpone these indefinitely    Medications list reviewed with patient today.   Additional history: as documented    Reviewed  and updated as needed this visit by clinical staff  Tobacco  Allergies  Med Hx  Surg Hx  Fam Hx  Soc Hx      Reviewed and updated as needed this visit by Provider       Patient Active Problem List   Diagnosis     Advanced directives, counseling/discussion     Hiatal hernia     Fibromyalgia syndrome     RA (rheumatoid arthritis) (H)     BMI 36-39     Chronic rhinitis     Insomnia     Olecranon bursitis of left elbow     Postmenopausal bleeding     History of gastric bypass     Insomnia, unspecified insomnia     Essential hypertension with goal blood pressure less than 140/90     Hypothyroidism, unspecified type     Dyslipidemia     MICHEAL (obstructive sleep apnea)     Type 2 diabetes mellitus without complication, without long-term current use of insulin (H)     Past Surgical History:   Procedure Laterality Date     CHOLECYSTECTOMY       DILATION AND CURETTAGE  12    PMB, cervical polyp     ENT SURGERY      tonsils X 2     GI SURGERY      lap band     HERNIA REPAIR       SOFT TISSUE SURGERY      multiple infections       Social History     Tobacco Use     Smoking status: Former Smoker     Last attempt to quit: 1984     Years since quittin.3     Smokeless tobacco: Never Used     Tobacco comment: smoke free household.   Substance Use Topics     Alcohol use: Yes     Comment: rarely     Family History   Problem Relation Age of Onset     Pulmonary Embolism Mother         89     Heart Disease Father      Asthma Father      Asthma Daughter      Myocardial Infarction Brother         age 50      "    BP Readings from Last 3 Encounters:   05/09/19 108/72   12/14/18 128/70   10/02/18 131/72    Wt Readings from Last 3 Encounters:   05/09/19 90 kg (198 lb 6.4 oz)   12/14/18 87.1 kg (192 lb)   10/02/18 87.5 kg (193 lb)        ROS:  Constitutional, HEENT, cardiovascular, pulmonary, GI, , musculoskeletal, neuro, skin, endocrine and psych systems are negative, except as otherwise noted.    This document serves as a record of the services and decisions personally performed and made by Charanjit Carty MD. It was created on his behalf by Darron Membreno, a trained medical scribe. The creation of this document is based on the provider's statements to the medical scribe.  Darron Membreno 12:10 PM May 9, 2019    OBJECTIVE:     /72   Pulse 86   Temp 96  F (35.6  C) (Oral)   Resp 18   Ht 1.575 m (5' 2\")   Wt 90 kg (198 lb 6.4 oz)   SpO2 100%   BMI 36.29 kg/m    Body mass index is 36.29 kg/m .  GENERAL: healthy, alert and no distress  EYES: Eyes grossly normal to inspection, PERRL and conjunctivae and sclerae normal  SKIN: no suspicious lesions or rashes to visible skin   NEURO: Normal strength and tone, mentation intact and speech normal  PSYCH: mentation appears normal, affect normal/bright    Diagnostic Test Results:  No results found for this or any previous visit (from the past 24 hour(s)).    ASSESSMENT/PLAN:     (E11.65) Uncontrolled type 2 diabetes mellitus without complication, without long-term current use of insulin (H)  (primary encounter diagnosis)  Comment: her diabetes mellitus is actually controlled pretty good and I will change this diagnosis to controlled diabetes mellitus depending on the test results from today . We discussed in significant detail  That as time passes after gastric bypass we would expect the need for probably ANY of these diabetes mellitus medications to drop steadily most likely to the point that she would become diet controlled diabetes mellitus status only  Plan: Hemoglobin " A1c, Basic metabolic panel        Further follow up depending on how things go , generally 6 months     (E03.9) Hypothyroidism, unspecified type  Comment: recheck and otherwise continue current plan of care    Plan: levothyroxine (SYNTHROID/LEVOTHROID) 150 MCG         tablet, TSH with free T4 reflex            (I10) Essential hypertension with goal blood pressure less than 140/90  Comment: blood pressure is controlled within acceptable limits   Plan: lisinopril (PRINIVIL/ZESTRIL) 10 MG tablet            (E78.5) Hyperlipidemia LDL goal <100  Comment: recheck   Plan: Lipid panel reflex to direct LDL Non-fasting            (M06.9) Rheumatoid arthritis involving multiple sites, unspecified rheumatoid factor presence (H)  Comment: continue current plan of care  With Dr. Selena Ring, rheumatologist with [facility]   Plan: see care everywhere     (E66.01) Morbid obesity (H)  Comment: Body mass index is 36.29 kg/m .   Plan: upcoming gastric bypass planned with a  At [facility]    (M79.7) Fibromyalgia syndrome  Comment: ongoing treatment with Gabapentin [Neurontin]   Plan:     (Z11.4) Encounter for screening for HIV  Comment: routine screening   Plan: HIV Antigen Antibody Combo              See Patient Instructions    The information in this document, created by the medical scribe for me, accurately reflects the services I personally performed and the decisions made by me. I have reviewed and approved this document for accuracy prior to leaving the patient care area.  May 9, 2019 12:10 PM    Charanjit Carty MD  [facility]

## 2019-05-10 DIAGNOSIS — E03.9 HYPOTHYROIDISM, UNSPECIFIED TYPE: ICD-10-CM

## 2019-05-10 LAB — HIV 1+2 AB+HIV1 P24 AG SERPL QL IA: NONREACTIVE

## 2019-05-10 RX ORDER — LEVOTHYROXINE SODIUM 175 UG/1
175 TABLET ORAL DAILY
Qty: 90 TABLET | Refills: 0 | Status: SHIPPED | OUTPATIENT
Start: 2019-05-10 | End: 2019-05-24

## 2019-05-14 ENCOUNTER — OFFICE VISIT - HEALTHEAST (OUTPATIENT)
Dept: SURGERY | Facility: CLINIC | Age: 64
End: 2019-05-14

## 2019-05-14 ENCOUNTER — AMBULATORY - HEALTHEAST (OUTPATIENT)
Dept: LAB | Facility: CLINIC | Age: 64
End: 2019-05-14

## 2019-05-14 DIAGNOSIS — I10 ESSENTIAL HYPERTENSION: ICD-10-CM

## 2019-05-14 DIAGNOSIS — K21.9 GASTROESOPHAGEAL REFLUX DISEASE WITHOUT ESOPHAGITIS: ICD-10-CM

## 2019-05-14 DIAGNOSIS — E11.9 TYPE 2 DIABETES, HBA1C GOAL < 8% (H): ICD-10-CM

## 2019-05-14 DIAGNOSIS — Z98.84 BARIATRIC SURGERY STATUS: ICD-10-CM

## 2019-05-14 DIAGNOSIS — E78.5 DYSLIPIDEMIA: ICD-10-CM

## 2019-05-14 DIAGNOSIS — G47.33 OBSTRUCTIVE SLEEP APNEA SYNDROME: ICD-10-CM

## 2019-05-14 LAB — HBA1C MFR BLD: 7.5 % (ref 4.2–6.1)

## 2019-05-14 ASSESSMENT — MIFFLIN-ST. JEOR: SCORE: 1386.37

## 2019-05-17 ENCOUNTER — COMMUNICATION - HEALTHEAST (OUTPATIENT)
Dept: SURGERY | Facility: CLINIC | Age: 64
End: 2019-05-17

## 2019-05-20 ENCOUNTER — AMBULATORY - HEALTHEAST (OUTPATIENT)
Dept: SURGERY | Facility: CLINIC | Age: 64
End: 2019-05-20

## 2019-05-20 DIAGNOSIS — E11.65 TYPE 2 DIABETES MELLITUS WITH HYPERGLYCEMIA (H): ICD-10-CM

## 2019-05-20 DIAGNOSIS — E13.21 OTHER SPECIFIED DIABETES MELLITUS WITH DIABETIC NEPHROPATHY (H): ICD-10-CM

## 2019-05-20 DIAGNOSIS — Z01.818 PREOPERATIVE CLEARANCE: ICD-10-CM

## 2019-05-24 ENCOUNTER — OFFICE VISIT (OUTPATIENT)
Dept: FAMILY MEDICINE | Facility: CLINIC | Age: 64
End: 2019-05-24
Payer: MEDICARE

## 2019-05-24 VITALS
DIASTOLIC BLOOD PRESSURE: 74 MMHG | OXYGEN SATURATION: 99 % | WEIGHT: 199 LBS | SYSTOLIC BLOOD PRESSURE: 126 MMHG | HEART RATE: 96 BPM | TEMPERATURE: 98.5 F | BODY MASS INDEX: 36.4 KG/M2 | RESPIRATION RATE: 18 BRPM

## 2019-05-24 DIAGNOSIS — Z01.818 PREOP GENERAL PHYSICAL EXAM: Primary | ICD-10-CM

## 2019-05-24 DIAGNOSIS — I10 ESSENTIAL HYPERTENSION WITH GOAL BLOOD PRESSURE LESS THAN 140/90: ICD-10-CM

## 2019-05-24 DIAGNOSIS — E03.9 HYPOTHYROIDISM, UNSPECIFIED TYPE: ICD-10-CM

## 2019-05-24 PROCEDURE — 99214 OFFICE O/P EST MOD 30 MIN: CPT | Performed by: INTERNAL MEDICINE

## 2019-05-24 PROCEDURE — 93000 ELECTROCARDIOGRAM COMPLETE: CPT | Performed by: INTERNAL MEDICINE

## 2019-05-24 RX ORDER — LISINOPRIL 10 MG/1
10 TABLET ORAL DAILY
Qty: 90 TABLET | Refills: 3 | Status: SHIPPED | OUTPATIENT
Start: 2019-05-24 | End: 2019-06-10

## 2019-05-24 RX ORDER — LEVOTHYROXINE SODIUM 175 UG/1
175 TABLET ORAL DAILY
Qty: 90 TABLET | Refills: 0 | Status: SHIPPED | OUTPATIENT
Start: 2019-05-24 | End: 2019-06-10

## 2019-05-24 NOTE — PROGRESS NOTES
[facility]  [address] [place] [address]  [place] [address]  [phone]  Dept: [phone]    PRE-OP EVALUATION:  Today's date: 2019    Kenna Colindres (: 1955) presents for pre-operative evaluation assessment as requested by Dr. Edmond.  She requires evaluation and anesthesia risk assessment prior to undergoing surgery/procedure for treatment of Stomach .    Fax number for surgical facility:   Primary Physician: Bienvenido Colvin  Type of Anesthesia Anticipated: General    Patient has a Health Care Directive or Living Will:  YES     Preop Questions 2019   Who is doing your surgery? ria edmond   What are you having done? bariatric surgery   Date of Surgery/Procedure: 19   Facility or Hospital where procedure/surgery will be performed: [facility]   1.  Do you have a history of Heart attack, stroke, stent, coronary bypass surgery, or other heart surgery? No   2.  Do you ever have any pain or discomfort in your chest? No   3.  Do you have a history of  Heart Failure? No   4.   Are you troubled by shortness of breath when:  walking on a level surface, or up a slight hill, or at night? No   5.  Do you currently have a cold, bronchitis or other respiratory infection? No   6.  Do you have a cough, shortness of breath, or wheezing? No   7.  Do you sometimes get pains in the calves of your legs when you walk? No   8. Do you or anyone in your family have previous history of blood clots? No   9.  Do you or does anyone in your family have a serious bleeding problem such as prolonged bleeding following surgeries or cuts? No   10. Have you ever had problems with anemia or been told to take iron pills? No   11. Have you had any abnormal blood loss such as black, tarry or bloody stools, or abnormal vaginal bleeding? No   12. Have you ever had a blood transfusion? YES - many many years ago. Associated with major abscess and 5 days inpatient in    13. Have you or any of your relatives ever had  problems with anesthesia? No   14. Do you have sleep apnea, excessive snoring or daytime drowsiness? YES - deals with obstructive sleep apnea and she will bring cpap machine and mask to the hospital    15. Do you have any prosthetic heart valves? No   16. Do you have prosthetic joints? No   17. Is there any chance that you may be pregnant? No         HPI:     HPI related to upcoming procedure: ongoing weight concerns Body mass index is 36.4 kg/m .  Technically morbid obesity     CO-MORBID CONDITIONS for a diagnosis of morbid obesity : CHF, DM, impaired fasting glucose/metabolic syndrome, GB disease, GERD, hyperlipidemia, HTN, obstructive sleep apnea, osteoarthritis in weight bearing joint       See problem list for active medical problems.  Problems all longstanding and stable, except as noted/documented.  See ROS for pertinent symptoms related to these conditions.                                                                                                                                                          .    MEDICAL HISTORY:     Patient Active Problem List    Diagnosis Date Noted     Type 2 diabetes mellitus without complication, without long-term current use of insulin (H) 12/16/2018     Priority: Medium     MICHEAL (obstructive sleep apnea) 07/18/2018     Priority: Medium     Initial diagnosis 2003  HSAT 7/2/2018 Mild AHI 6.0 wt 194       Dyslipidemia 09/07/2016     Priority: Medium     Essential hypertension with goal blood pressure less than 140/90 08/24/2016     Priority: Medium     Hypothyroidism, unspecified type 08/24/2016     Priority: Medium     Insomnia, unspecified insomnia 12/03/2015     Priority: Medium     History of gastric bypass 07/15/2014     Priority: Medium     In 2004. Had some complications of stenosis and obstruction problems        Postmenopausal bleeding 09/23/2013     Priority: Medium     She had a negative endometrial biopsy in 2013, see notes with Dr. Alvarado, [facility] electronic  medical records        Olecranon bursitis of left elbow 08/28/2013     Priority: Medium     Chronic rhinitis 08/22/2012     Priority: Medium     Insomnia 08/22/2012     Priority: Medium     Declined medication for depression       Hiatal hernia 08/17/2012     Priority: Medium     Fibromyalgia syndrome 08/17/2012     Priority: Medium     Since 1992       RA (rheumatoid arthritis) (H) 08/17/2012     Priority: Medium     Stopped methotrexate in Jan ( was on for 17 yrs)  3yrs ago stopped prednisone ( was on for 14yrs)  Now on Enbrel: takes irregularly       BMI 36-39 08/17/2012     Priority: Medium     Advanced directives, counseling/discussion 08/14/2012     Priority: Medium     Patient states has Advance Directive and will bring in a copy to clinic. 8/14/2012 JOANN Harden MA          Past Medical History:   Diagnosis Date     Arthritis      Diabetes mellitus (H)      Fibromyalgia      Hyperlipidemia      Hypertension      MICHEAL (obstructive sleep apnea)      Seasonal allergies      Thyroid disease      Past Surgical History:   Procedure Laterality Date     CHOLECYSTECTOMY       DILATION AND CURETTAGE  11/21/13    PMB, cervical polyp     ENT SURGERY      tonsils X 2     GI SURGERY      lap band     HERNIA REPAIR       SOFT TISSUE SURGERY      multiple infections     Current Outpatient Medications   Medication Sig Dispense Refill     ASPIRIN LOW DOSE 81 MG EC tablet TAKE 1 TABLET DAILY 90 tablet 3     empagliflozin (JARDIANCE) 25 MG TABS tablet Take 1 tablet (25 mg) by mouth daily 90 tablet 3     gabapentin (NEURONTIN) 300 MG capsule Take 1 capsule (300 mg) by mouth 3 times daily 270 capsule 1     levothyroxine (SYNTHROID/LEVOTHROID) 175 MCG tablet Take 1 tablet (175 mcg) by mouth daily 90 tablet 0     liraglutide (VICTOZA) 18 MG/3ML soln Inject Subcutaneous daily       lisinopril (PRINIVIL/ZESTRIL) 10 MG tablet Take 1 tablet (10 mg) by mouth daily 90 tablet 3     metFORMIN (GLUCOPHAGE-XR) 500 MG 24 hr tablet TAKE 2  "TABLETS BY MOUTH TWO TIMES DAILY WITH MEALS 360 tablet 1     Multiple Vitamin (MULTIVITAMINS PO) Take by mouth daily       omeprazole (PRILOSEC OTC) 20 MG tablet Take 20 mg by mouth daily       pilocarpine (SALAGEN) 5 MG tablet Take 5 mg by mouth       pravastatin (PRAVACHOL) 40 MG tablet Take 1 tablet (40 mg) by mouth daily 90 tablet 3     sitagliptin (JANUVIA) 100 MG tablet Take 1 tablet (100 mg) by mouth daily (Patient not taking: Reported on 2019) 90 tablet 1     tiZANidine (ZANAFLEX) 2 MG tablet 1-2 TABS 1-2 HOURS BEFORE HS.       OTC products: None, except as noted above and also uses Medical Marijuana      Allergies   Allergen Reactions     Metrizamide      Other reaction(s): Other (See Comments)  \"almost  on the table\"     Sulfa Drugs Hives     Pt received sore in mouth  Pt received sore in mouth       Contrast Dye       Latex Allergy: NO    Social History     Tobacco Use     Smoking status: Former Smoker     Last attempt to quit: 1984     Years since quittin.4     Smokeless tobacco: Never Used     Tobacco comment: smoke free household.   Substance Use Topics     Alcohol use: Yes     Comment: rarely     History   Drug Use No       REVIEW OF SYSTEMS:   CONSTITUTIONAL: NEGATIVE for fever, chills, change in weight  ENT/MOUTH: NEGATIVE for ear, mouth and throat problems  RESP: NEGATIVE for significant cough or SOB  CV: NEGATIVE for chest pain, palpitations or peripheral edema  Remainder of the review of systems is negative     EXAM:   /74   Pulse 96   Temp 98.5  F (36.9  C) (Oral)   Resp 18   Wt 90.3 kg (199 lb)   SpO2 99%   BMI 36.40 kg/m    GENERAL APPEARANCE: healthy, alert and no distress  HENT: ear canals and TM's normal and nose and mouth without ulcers or lesions  HENT: nose and mouth without ulcers or lesions  RESP: lungs clear to auscultation - no rales, rhonchi or wheezes  CV: regular rate and rhythm, normal S1 S2, no S3 or S4 and no murmur, click or rub   ABDOMEN: soft, " nontender, no HSM or masses and bowel sounds normal  NEURO: Normal strength and tone, sensory exam grossly normal, mentation intact and speech normal    DIAGNOSTICS:       Recent Labs   Lab Test 05/09/19  1240 12/07/18  0947  03/27/17  1055  04/13/16  1001   HGB  --   --   --  13.8  --  14.4   PLT  --   --   --  196  --  189    138   < > 136   < > 138   POTASSIUM 4.6 4.6   < > 4.3   < > 4.5   CR 0.78 0.72   < > 0.63   < > 0.67   A1C 6.9* 6.9*   < >  --    < > 8.8*    < > = values in this interval not displayed.        IMPRESSION:   Reason for surgery/procedure: weight loss surgery   Diagnosis/reason for consult: assess and minimize preoperative risk per consulting surgeon     The proposed surgical procedure is considered INTERMEDIATE risk.    REVISED CARDIAC RISK INDEX  The patient has the following serious cardiovascular risks for perioperative complications such as (MI, PE, VFib and 3  AV Block):  No serious cardiac risks  INTERPRETATION: 0 risks: Class I (very low risk - 0.4% complication rate)    The patient has the following additional risks for perioperative complications:  Morbid obesity      ICD-10-CM    1. Preop general physical exam Z01.818        RECOMMENDATIONS:         --Patient is to take all scheduled medications on the day of surgery EXCEPT for modifications listed below.    Aspirin has been not taken for more then a week    Skip all regularly scheduled medications the night before the surgery and morning of the surgery     APPROVAL GIVEN to proceed with proposed procedure, without further diagnostic evaluation       Signed Electronically by: Charanjit Carty MD    Copy of this evaluation report is provided to requesting physician.    [facility] Preop Guidelines    Revised Cardiac Risk Index

## 2019-05-29 ENCOUNTER — AMBULATORY - HEALTHEAST (OUTPATIENT)
Dept: LAB | Facility: CLINIC | Age: 64
End: 2019-05-29

## 2019-05-29 DIAGNOSIS — Z01.818 PREOPERATIVE CLEARANCE: ICD-10-CM

## 2019-05-29 DIAGNOSIS — E13.21 OTHER SPECIFIED DIABETES MELLITUS WITH DIABETIC NEPHROPATHY (H): ICD-10-CM

## 2019-05-29 DIAGNOSIS — E11.65 TYPE 2 DIABETES MELLITUS WITH HYPERGLYCEMIA (H): ICD-10-CM

## 2019-05-29 LAB
ALBUMIN UR-MCNC: NEGATIVE MG/DL
APPEARANCE UR: CLEAR
APTT PPP: 29 SECONDS (ref 24–37)
BILIRUB UR QL STRIP: NEGATIVE
COLOR UR AUTO: YELLOW
GLUCOSE UR STRIP-MCNC: ABNORMAL MG/DL
HGB UR QL STRIP: NEGATIVE
INR PPP: 1.05 (ref 0.9–1.1)
KETONES UR STRIP-MCNC: NEGATIVE MG/DL
LEUKOCYTE ESTERASE UR QL STRIP: NEGATIVE
NITRATE UR QL: NEGATIVE
PH UR STRIP: 5.5 [PH] (ref 5–8)
SP GR UR STRIP: 1.01 (ref 1–1.03)
UROBILINOGEN UR STRIP-ACNC: ABNORMAL

## 2019-05-30 ENCOUNTER — COMMUNICATION - HEALTHEAST (OUTPATIENT)
Dept: SURGERY | Facility: CLINIC | Age: 64
End: 2019-05-30

## 2019-06-05 ENCOUNTER — SURGERY - HEALTHEAST (OUTPATIENT)
Dept: SURGERY | Facility: CLINIC | Age: 64
End: 2019-06-05

## 2019-06-05 ENCOUNTER — ANESTHESIA - HEALTHEAST (OUTPATIENT)
Dept: SURGERY | Facility: CLINIC | Age: 64
End: 2019-06-05

## 2019-06-05 ASSESSMENT — MIFFLIN-ST. JEOR
SCORE: 1363.69
SCORE: 1360.29

## 2019-06-10 ENCOUNTER — TELEPHONE (OUTPATIENT)
Dept: FAMILY MEDICINE | Facility: CLINIC | Age: 64
End: 2019-06-10

## 2019-06-10 ENCOUNTER — COMMUNICATION - HEALTHEAST (OUTPATIENT)
Dept: SURGERY | Facility: CLINIC | Age: 64
End: 2019-06-10

## 2019-06-10 DIAGNOSIS — I10 ESSENTIAL HYPERTENSION WITH GOAL BLOOD PRESSURE LESS THAN 140/90: ICD-10-CM

## 2019-06-10 DIAGNOSIS — E03.9 HYPOTHYROIDISM, UNSPECIFIED TYPE: ICD-10-CM

## 2019-06-10 RX ORDER — LISINOPRIL 10 MG/1
10 TABLET ORAL DAILY
Qty: 90 TABLET | Refills: 2 | Status: SHIPPED | OUTPATIENT
Start: 2019-06-10 | End: 2020-03-11

## 2019-06-10 RX ORDER — LEVOTHYROXINE SODIUM 175 UG/1
175 TABLET ORAL DAILY
Qty: 90 TABLET | Refills: 0 | Status: SHIPPED | OUTPATIENT
Start: 2019-06-10 | End: 2019-09-01

## 2019-06-10 NOTE — TELEPHONE ENCOUNTER
Called patient and left message to call clinic back.    Nitza SANTORO CMA ([facility])

## 2019-06-10 NOTE — TELEPHONE ENCOUNTER
Reason for call:  Other   Patient called regarding (reason for call): call back  Additional comments: Patients  is calling because he said Dr. Carty can send her prescriptions to [facility] pharmacy in [place] now. Please call back      Phone number to reach patient:  Home number on file [phone] (home)    Best Time:  any    Can we leave a detailed message on this number?  YES

## 2019-06-10 NOTE — TELEPHONE ENCOUNTER
Lisinopril and Levothyroxine sent to [facility] in [place]  Not sure what other meds are needed but the rest either came from PCP (Dr. Colvin) or endocrinology (Dr. Nicole)    Brittney Hyde RN

## 2019-06-11 ENCOUNTER — COMMUNICATION - HEALTHEAST (OUTPATIENT)
Dept: SURGERY | Facility: CLINIC | Age: 64
End: 2019-06-11

## 2019-06-11 ENCOUNTER — MEDICAL CORRESPONDENCE (OUTPATIENT)
Dept: HEALTH INFORMATION MANAGEMENT | Facility: CLINIC | Age: 64
End: 2019-06-11

## 2019-06-11 ENCOUNTER — TELEPHONE (OUTPATIENT)
Dept: ONCOLOGY | Facility: CLINIC | Age: 64
End: 2019-06-11

## 2019-06-11 DIAGNOSIS — R11.0 NAUSEA: ICD-10-CM

## 2019-06-11 DIAGNOSIS — E86.0 DEHYDRATION: ICD-10-CM

## 2019-06-11 DIAGNOSIS — R19.7 DIARRHEA: ICD-10-CM

## 2019-06-11 DIAGNOSIS — Z98.84 HX OF GASTRIC BYPASS: ICD-10-CM

## 2019-06-11 NOTE — TELEPHONE ENCOUNTER
Received call from Kasia PASCUAL at [facility] surgery and bariatrics. Patient needs 2L lactated ringers and thiamin infusion tomorrow and is wondering if infusion center has openings. Discussed provider would need credentialing verified prior to accepting orders for infusion. Kasia will have a [facility] provider, Dr Kohli, order patient's medications to be sure orders are accepted. Writer verified with infusion charge nurse, appointment available tomorrow 6/12/19. Kasia will fax order and call patient to let her know [facility] infusion has an appointment for her at 2:00pm 6/12/19.   Divya Miguel  RN, BSN, OCN

## 2019-06-12 ENCOUNTER — COMMUNICATION - HEALTHEAST (OUTPATIENT)
Dept: ADMINISTRATIVE | Facility: HOSPITAL | Age: 64
End: 2019-06-12

## 2019-06-12 ENCOUNTER — INFUSION THERAPY VISIT (OUTPATIENT)
Dept: INFUSION THERAPY | Facility: CLINIC | Age: 64
End: 2019-06-12
Payer: MEDICARE

## 2019-06-12 VITALS
RESPIRATION RATE: 16 BRPM | TEMPERATURE: 98.1 F | OXYGEN SATURATION: 99 % | BODY MASS INDEX: 34.68 KG/M2 | DIASTOLIC BLOOD PRESSURE: 76 MMHG | HEART RATE: 93 BPM | SYSTOLIC BLOOD PRESSURE: 123 MMHG | WEIGHT: 189.6 LBS

## 2019-06-12 DIAGNOSIS — R19.7 DIARRHEA, UNSPECIFIED TYPE: ICD-10-CM

## 2019-06-12 DIAGNOSIS — Z98.84 HISTORY OF GASTRIC BYPASS: ICD-10-CM

## 2019-06-12 DIAGNOSIS — R11.0 NAUSEA: ICD-10-CM

## 2019-06-12 DIAGNOSIS — E86.0 DEHYDRATION: Primary | ICD-10-CM

## 2019-06-12 DIAGNOSIS — E86.0 DEHYDRATION: ICD-10-CM

## 2019-06-12 PROCEDURE — 96365 THER/PROPH/DIAG IV INF INIT: CPT | Performed by: NURSE PRACTITIONER

## 2019-06-12 PROCEDURE — 96366 THER/PROPH/DIAG IV INF ADDON: CPT | Performed by: NURSE PRACTITIONER

## 2019-06-12 PROCEDURE — 99207 ZZC NO CHARGE LOS: CPT

## 2019-06-12 ASSESSMENT — PAIN SCALES - GENERAL: PAINLEVEL: MODERATE PAIN (5)

## 2019-06-12 NOTE — PROGRESS NOTES
Infusion Nursing Note:  Kenna Colindres presents today for IVF.    Patient seen by provider today: No   present during visit today: Not Applicable.    Note: N/A.    Intravenous Access:  Peripheral IV placed.    Treatment Conditions:  Not Applicable.      Post Infusion Assessment:  Patient tolerated infusion without incident.  Site patent and intact, free from redness, edema or discomfort.  No evidence of extravasations.  Access discontinued per protocol.       Discharge Plan:   Patient discharged in stable condition accompanied by: self.  Departure Mode: Ambulatory.    Joan Almazan RN

## 2019-06-12 NOTE — PROGRESS NOTES
"Patient states she \"passed out\" on Monday. States her head was on the bathroom floor. She had bariatric revision surgery on 6/5-took out old lap band. Her  Stalin drove her today. Blaire Aguilar RN    "

## 2019-06-13 ENCOUNTER — COMMUNICATION - HEALTHEAST (OUTPATIENT)
Dept: SURGERY | Facility: CLINIC | Age: 64
End: 2019-06-13

## 2019-06-13 NOTE — TELEPHONE ENCOUNTER
Patient informed. The only other medication she may need is trazodone which we do not have on her medication list and is not sure who prescribed. She will check her bottle and call pharmacy for refill. Erica Hanna MA

## 2019-06-17 ENCOUNTER — TRANSFERRED RECORDS (OUTPATIENT)
Dept: HEALTH INFORMATION MANAGEMENT | Facility: CLINIC | Age: 64
End: 2019-06-17

## 2019-06-17 ENCOUNTER — AMBULATORY - HEALTHEAST (OUTPATIENT)
Dept: SURGERY | Facility: CLINIC | Age: 64
End: 2019-06-17

## 2019-06-17 DIAGNOSIS — Z98.84 BARIATRIC SURGERY STATUS: ICD-10-CM

## 2019-06-17 DIAGNOSIS — E66.9 OBESITY (BMI 30-39.9): ICD-10-CM

## 2019-06-17 DIAGNOSIS — Z71.3 DIETARY COUNSELING: ICD-10-CM

## 2019-06-21 ENCOUNTER — COMMUNICATION - HEALTHEAST (OUTPATIENT)
Dept: SURGERY | Facility: CLINIC | Age: 64
End: 2019-06-21

## 2019-06-21 ENCOUNTER — AMBULATORY - HEALTHEAST (OUTPATIENT)
Dept: SURGERY | Facility: CLINIC | Age: 64
End: 2019-06-21

## 2019-06-21 DIAGNOSIS — E86.0 DEHYDRATION: ICD-10-CM

## 2019-06-21 DIAGNOSIS — Z98.84 HX OF GASTRIC BYPASS: ICD-10-CM

## 2019-06-21 DIAGNOSIS — R11.0 NAUSEA: ICD-10-CM

## 2019-06-25 ENCOUNTER — OFFICE VISIT - HEALTHEAST (OUTPATIENT)
Dept: SURGERY | Facility: CLINIC | Age: 64
End: 2019-06-25

## 2019-06-25 ENCOUNTER — COMMUNICATION - HEALTHEAST (OUTPATIENT)
Dept: SURGERY | Facility: CLINIC | Age: 64
End: 2019-06-25

## 2019-06-25 DIAGNOSIS — E66.01 MORBID OBESITY (H): ICD-10-CM

## 2019-06-25 DIAGNOSIS — Z98.84 S/P GASTRIC BYPASS: ICD-10-CM

## 2019-06-25 ASSESSMENT — MIFFLIN-ST. JEOR: SCORE: 1313.8

## 2019-07-01 DIAGNOSIS — E03.9 HYPOTHYROIDISM, UNSPECIFIED TYPE: ICD-10-CM

## 2019-07-01 LAB
T4 FREE SERPL-MCNC: 1.35 NG/DL (ref 0.76–1.46)
TSH SERPL DL<=0.005 MIU/L-ACNC: 5.51 MU/L (ref 0.4–4)

## 2019-07-01 PROCEDURE — 36415 COLL VENOUS BLD VENIPUNCTURE: CPT | Performed by: INTERNAL MEDICINE

## 2019-07-01 PROCEDURE — 84439 ASSAY OF FREE THYROXINE: CPT | Performed by: INTERNAL MEDICINE

## 2019-07-01 PROCEDURE — 84443 ASSAY THYROID STIM HORMONE: CPT | Performed by: INTERNAL MEDICINE

## 2019-07-01 NOTE — RESULT ENCOUNTER NOTE
Dear Kenna,    Your recent test results are attached.      Improved thyroid.  I'm still awaiting your Free T4 and then we will contact you with any med changes.    If you have any questions please feel free to contact [phone] or myself via [employer].    Sincerely,  Emily Coats, CNP

## 2019-07-10 RX ORDER — METFORMIN HCL 500 MG
TABLET, EXTENDED RELEASE 24 HR ORAL
Qty: 360 TABLET | Refills: 1 | Status: SHIPPED | OUTPATIENT
Start: 2019-07-10 | End: 2019-07-22 | Stop reason: ALTCHOICE

## 2019-07-10 NOTE — TELEPHONE ENCOUNTER
Gillian is calling from [facility] regarding the prescription, please call back asap

## 2019-07-10 NOTE — TELEPHONE ENCOUNTER
Spoke to pharmacy and they were checking on the status of the rx.  Shanna CHAN CMA ([facility])

## 2019-07-18 ENCOUNTER — AMBULATORY - HEALTHEAST (OUTPATIENT)
Dept: SURGERY | Facility: CLINIC | Age: 64
End: 2019-07-18

## 2019-07-18 DIAGNOSIS — Z98.84 BARIATRIC SURGERY STATUS: ICD-10-CM

## 2019-07-18 DIAGNOSIS — E66.9 OBESITY WITH BODY MASS INDEX OF 30.0-39.9: ICD-10-CM

## 2019-07-18 DIAGNOSIS — Z71.3 NUTRITIONAL COUNSELING: ICD-10-CM

## 2019-07-22 ENCOUNTER — COMMUNICATION - HEALTHEAST (OUTPATIENT)
Dept: SURGERY | Facility: CLINIC | Age: 64
End: 2019-07-22

## 2019-07-22 ENCOUNTER — TELEPHONE (OUTPATIENT)
Dept: FAMILY MEDICINE | Facility: CLINIC | Age: 64
End: 2019-07-22

## 2019-07-22 RX ORDER — METFORMIN HCL 500 MG
TABLET, EXTENDED RELEASE 24 HR ORAL
Qty: 360 TABLET | Refills: 1 | COMMUNITY
Start: 2019-07-10 | End: 2019-09-16

## 2019-07-22 NOTE — TELEPHONE ENCOUNTER
Current prescription is for the XR and the request is to switch to the regular release     Called patient and she stated that we can disregard the request  Due to gastric bypass surgery, she was previously advised to cut the metformin pills into 8 pieces before taking  The bitterness from the pills would make her want to vomit  However, she just spoke with her nutritionist and was told she no longer has to cut the pills and can take them whole   Patient thought she would have swallowing the large pills whole but just took one and was fine    Pharmacy updated  Added metformin XR back onto med list    Brittney Hyde RN

## 2019-07-22 NOTE — TELEPHONE ENCOUNTER
Change her prescription over to the Metformin ( Glucophage) XR [ extended release ]     Same milligram for milligram dosing , same instructions but she DOES have the option of taking all 4 tabs at bedtime     Charanjit Carty MD

## 2019-07-22 NOTE — TELEPHONE ENCOUNTER
Received the note from pharmacy saying that pt is requesting new medication metformin  mg because she is having hard time getting metformin  mg down it is making her throw up. Please send new Rx if appropriate.  Glenis GALLAGHER CMA

## 2019-08-19 ENCOUNTER — COMMUNICATION - HEALTHEAST (OUTPATIENT)
Dept: SURGERY | Facility: CLINIC | Age: 64
End: 2019-08-19

## 2019-08-19 DIAGNOSIS — R11.10 EMESIS: ICD-10-CM

## 2019-08-19 DIAGNOSIS — Z98.84 HISTORY OF ROUX-EN-Y GASTRIC BYPASS: ICD-10-CM

## 2019-08-20 ENCOUNTER — AMBULATORY - HEALTHEAST (OUTPATIENT)
Dept: SURGERY | Facility: CLINIC | Age: 64
End: 2019-08-20

## 2019-08-21 ASSESSMENT — MIFFLIN-ST. JEOR: SCORE: 1236.69

## 2019-08-22 ENCOUNTER — TELEPHONE (OUTPATIENT)
Dept: FAMILY MEDICINE | Facility: CLINIC | Age: 64
End: 2019-08-22

## 2019-08-22 DIAGNOSIS — E11.9 TYPE 2 DIABETES MELLITUS WITHOUT COMPLICATION, WITHOUT LONG-TERM CURRENT USE OF INSULIN (H): Primary | ICD-10-CM

## 2019-08-22 NOTE — TELEPHONE ENCOUNTER
revieved a fax from pharmacy. Patient requests new rx: PT is having a hard time getting the metFORMIN (GLUCOPHAGE-XR) 500 MG 24 hr tablet down she did not have a problem with the metFORMIN (GLUCOPHAGE) 500 MG . Please send new rx

## 2019-08-26 ENCOUNTER — ANESTHESIA - HEALTHEAST (OUTPATIENT)
Dept: SURGERY | Facility: AMBULATORY SURGERY CENTER | Age: 64
End: 2019-08-26

## 2019-08-27 ENCOUNTER — SURGERY - HEALTHEAST (OUTPATIENT)
Dept: SURGERY | Facility: AMBULATORY SURGERY CENTER | Age: 64
End: 2019-08-27

## 2019-08-27 ASSESSMENT — MIFFLIN-ST. JEOR: SCORE: 1236.69

## 2019-09-01 DIAGNOSIS — E03.9 HYPOTHYROIDISM, UNSPECIFIED TYPE: ICD-10-CM

## 2019-09-03 RX ORDER — LEVOTHYROXINE SODIUM 175 UG/1
175 TABLET ORAL DAILY
Qty: 90 TABLET | Refills: 0 | Status: SHIPPED | OUTPATIENT
Start: 2019-09-03 | End: 2019-12-03

## 2019-09-03 NOTE — TELEPHONE ENCOUNTER
"Routing refill request to provider for review/approval because:  Labs out of range:      Requested Prescriptions   Pending Prescriptions Disp Refills     levothyroxine (SYNTHROID/LEVOTHROID) 175 MCG tablet [Pharmacy Med Name: LEVOTHYROXINE 175 MCG TABLET]  Last Written Prescription Date:  6/10/19  Last Fill Quantity: 90,  # refills: 0   Last office visit: 5/24/2019 with prescribing provider:     Future Office Visit:   90 tablet 0     Sig: TAKE 1 TABLET (175 MCG) BY MOUTH DAILY       Thyroid Protocol Failed - 9/3/2019  8:41 AM        Failed - Normal TSH on file in past 12 months     Recent Labs   Lab Test 07/01/19  0707   TSH 5.51*              Passed - Patient is 12 years or older        Passed - Recent (12 mo) or future (30 days) visit within the authorizing provider's specialty     Patient had office visit in the last 12 months or has a visit in the next 30 days with authorizing provider or within the authorizing provider's specialty.  See \"Patient Info\" tab in inbasket, or \"Choose Columns\" in Meds & Orders section of the refill encounter.              Passed - Medication is active on med list        Passed - No active pregnancy on record     If patient is pregnant or has had a positive pregnancy test, please check TSH.          Passed - No positive pregnancy test in past 12 months     If patient is pregnant or has had a positive pregnancy test, please check TSH.          Yasmin Jolley, RN - BC      "

## 2019-09-06 ENCOUNTER — OFFICE VISIT - HEALTHEAST (OUTPATIENT)
Dept: SURGERY | Facility: CLINIC | Age: 64
End: 2019-09-06

## 2019-09-06 DIAGNOSIS — Z98.84 BARIATRIC SURGERY STATUS: ICD-10-CM

## 2019-09-06 DIAGNOSIS — Z71.3 NUTRITIONAL COUNSELING: ICD-10-CM

## 2019-09-06 DIAGNOSIS — E66.811 OBESITY, CLASS I, BMI 30.0-34.9 (SEE ACTUAL BMI): ICD-10-CM

## 2019-09-06 ASSESSMENT — MIFFLIN-ST. JEOR: SCORE: 1232.15

## 2019-09-16 ENCOUNTER — OFFICE VISIT (OUTPATIENT)
Dept: SLEEP MEDICINE | Facility: CLINIC | Age: 64
End: 2019-09-16
Payer: MEDICARE

## 2019-09-16 VITALS
SYSTOLIC BLOOD PRESSURE: 104 MMHG | OXYGEN SATURATION: 96 % | HEART RATE: 80 BPM | WEIGHT: 163 LBS | HEIGHT: 62 IN | DIASTOLIC BLOOD PRESSURE: 70 MMHG | BODY MASS INDEX: 30 KG/M2

## 2019-09-16 DIAGNOSIS — G47.33 OSA (OBSTRUCTIVE SLEEP APNEA): Primary | ICD-10-CM

## 2019-09-16 PROCEDURE — 99213 OFFICE O/P EST LOW 20 MIN: CPT | Performed by: PHYSICIAN ASSISTANT

## 2019-09-16 ASSESSMENT — MIFFLIN-ST. JEOR: SCORE: 1237.61

## 2019-09-16 NOTE — PATIENT INSTRUCTIONS
Your BMI is Body mass index is 29.81 kg/m .  Weight management is a personal decision.  If you are interested in exploring weight loss strategies, the following discussion covers the approaches that may be successful. Body mass index (BMI) is one way to tell whether you are at a healthy weight, overweight, or obese. It measures your weight in relation to your height.  A BMI of 18.5 to 24.9 is in the healthy range. A person with a BMI of 25 to 29.9 is considered overweight, and someone with a BMI of 30 or greater is considered obese. More than two-thirds of American adults are considered overweight or obese.  Being overweight or obese increases the risk for further weight gain. Excess weight may lead to heart disease and diabetes.  Creating and following plans for healthy eating and physical activity may help you improve your health.  Weight control is part of healthy lifestyle and includes exercise, emotional health, and healthy eating habits. Careful eating habits lifelong are the mainstay of weight control. Though there are significant health benefits from weight loss, long-term weight loss with diet alone may be very difficult to achieve- studies show long-term success with dietary management in less than 10% of people. Attaining a healthy weight may be especially difficult to achieve in those with severe obesity. In some cases, medications, devices and surgical management might be considered.  What can you do?  If you are overweight or obese and are interested in methods for weight loss, you should discuss this with your provider.     Consider reducing daily calorie intake by 500 calories.     Keep a food journal.     Avoiding skipping meals, consider cutting portions instead.    Diet combined with exercise helps maintain muscle while optimizing fat loss. Strength training is particularly important for building and maintaining muscle mass. Exercise helps reduce stress, increase energy, and improves fitness.  Increasing exercise without diet control, however, may not burn enough calories to loose weight.       Start walking three days a week 10-20 minutes at a time    Work towards walking thirty minutes five days a week     Eventually, increase the speed of your walking for 1-2 minutes at time    In addition, we recommend that you review healthy lifestyles and methods for weight loss available through the National Institutes of Health patient information sites:  http://win.niddk.nih.gov/publications/index.htm    And look into health and wellness programs that may be available through your health insurance provider, employer, local community center, or hailey club.    Weight management plan: Patient was referred to their PCP to discuss a diet and exercise plan.

## 2019-09-16 NOTE — PROGRESS NOTES
"Obstructive Sleep Apnea - PAP Follow-Up Visit:    Chief Complaint   Patient presents with     CPAP Follow Up       Kenna Colindres comes in today for follow-up of their mild sleep apnea, managed with CPAP.     Initial diagnosis 2003  HSAT 7/2/2018 Mild AHI 6.0 wt 194    Overall, she rates the experience with PAP as 6 (0 poor, 10 great). The mask is not comfortable.  The mask is uncomfortable because of \"too noisey\".  The mask is leaking   She is not snoring with the mask on. She is having gasp arousals.  She is not having significant oral/nasal dryness. The pressure is comfortable.     Her PAP interface is Nasal Pillows.    Bedtime is typically 2300. Usually it takes about 30 min minutes to fall asleep with the mask on. Wake time is typically 0700.  Patient is using PAP therapy 5 hours per night. The patient is usually getting 5 hours of sleep per night.    She does not feel rested in the morning.    Total score - [place]: 10 (9/16/2019 12:00 PM)      EMMY Total Score: 20      ResMed   Auto-PAP 5.0 - 15.0 cmH2O 30 day usage data:    100% of days with > 4 hours of use. 0/30 days with no use.   Average use 496 minutes per day.   95%ile Leak 19.43 L/min.   CPAP 95% pressure 11.1 cm.   AHI 1.03 events per hour.         Past medical/surgical history, family history, social history, medications and allergies were reviewed.      Problem List:  Patient Active Problem List    Diagnosis Date Noted     Dehydration 06/12/2019     Priority: Medium     Nausea 06/12/2019     Priority: Medium     Diarrhea 06/12/2019     Priority: Medium     Type 2 diabetes mellitus without complication, without long-term current use of insulin (H) 12/16/2018     Priority: Medium     MICHEAL (obstructive sleep apnea) 07/18/2018     Priority: Medium     Initial diagnosis 2003  HSAT 7/2/2018 Mild AHI 6.0 wt 194       Dyslipidemia 09/07/2016     Priority: Medium     Essential hypertension with goal blood pressure less than 140/90 08/24/2016     Priority: " "Medium     Hypothyroidism, unspecified type 08/24/2016     Priority: Medium     Insomnia, unspecified insomnia 12/03/2015     Priority: Medium     History of gastric bypass 07/15/2014     Priority: Medium     In 2004. Had some complications of stenosis and obstruction problems        Postmenopausal bleeding 09/23/2013     Priority: Medium     She had a negative endometrial biopsy in 2013, see notes with Dr. Alvarado, [facility] electronic medical records        Olecranon bursitis of left elbow 08/28/2013     Priority: Medium     Chronic rhinitis 08/22/2012     Priority: Medium     Insomnia 08/22/2012     Priority: Medium     Declined medication for depression       Hiatal hernia 08/17/2012     Priority: Medium     Fibromyalgia syndrome 08/17/2012     Priority: Medium     Since 1992       RA (rheumatoid arthritis) (H) 08/17/2012     Priority: Medium     Stopped methotrexate in Jan ( was on for 17 yrs)  3yrs ago stopped prednisone ( was on for 14yrs)  Now on Enbrel: takes irregularly       BMI 36-39 08/17/2012     Priority: Medium     Advanced directives, counseling/discussion 08/14/2012     Priority: Medium     Patient states has Advance Directive and will bring in a copy to clinic. 8/14/2012 JOANN Harden MA            /70   Pulse 80   Ht 1.575 m (5' 2\")   Wt 73.9 kg (163 lb)   SpO2 96%   BMI 29.81 kg/m      Impression/Plan:     1. Mild Sleep apnea-  Tolerating PAP well. Daytime symptoms are improved.   Continue current treatment.  Comprehensive DME    2. Chronic insomnia-  Overactive mind all night.  She is going to start Tizanidine per her rheumatologist. If that does not help, she will see Dr. Johnson for CBT-I.      Follow up in about a year or sooner if any concerns.    Twenty minutes spent with patient, all of which were spent face-to-face counseling, consulting, coordinating plan of care regarding MICHEAL and insomnia.      Margarita Mg PA-C    CC:  Bienvenido Colvin    "

## 2019-09-16 NOTE — NURSING NOTE
"Chief Complaint   Patient presents with     CPAP Follow Up       Initial /70   Pulse 80   Ht 1.575 m (5' 2\")   Wt 73.9 kg (163 lb)   SpO2 96%   BMI 29.81 kg/m   Estimated body mass index is 29.81 kg/m  as calculated from the following:    Height as of this encounter: 1.575 m (5' 2\").    Weight as of this encounter: 73.9 kg (163 lb).    Medication Reconciliation: complete      "

## 2019-11-08 ENCOUNTER — TRANSFERRED RECORDS (OUTPATIENT)
Dept: HEALTH INFORMATION MANAGEMENT | Facility: CLINIC | Age: 64
End: 2019-11-08

## 2019-11-21 ENCOUNTER — COMMUNICATION - HEALTHEAST (OUTPATIENT)
Dept: SURGERY | Facility: CLINIC | Age: 64
End: 2019-11-21

## 2019-11-21 DIAGNOSIS — K91.2 POSTSURGICAL MALABSORPTION: ICD-10-CM

## 2019-11-21 DIAGNOSIS — Z98.84 S/P GASTRIC BYPASS: ICD-10-CM

## 2019-11-21 DIAGNOSIS — K90.9 INTESTINAL MALABSORPTION, UNSPECIFIED: ICD-10-CM

## 2019-12-03 DIAGNOSIS — E03.9 HYPOTHYROIDISM, UNSPECIFIED TYPE: ICD-10-CM

## 2019-12-03 RX ORDER — LEVOTHYROXINE SODIUM 175 UG/1
TABLET ORAL
Qty: 90 TABLET | Refills: 0 | Status: SHIPPED | OUTPATIENT
Start: 2019-12-03 | End: 2020-03-16

## 2020-02-03 ENCOUNTER — TELEPHONE (OUTPATIENT)
Dept: FAMILY MEDICINE | Facility: CLINIC | Age: 65
End: 2020-02-03

## 2020-03-02 ENCOUNTER — HEALTH MAINTENANCE LETTER (OUTPATIENT)
Age: 65
End: 2020-03-02

## 2020-03-08 DIAGNOSIS — I10 ESSENTIAL HYPERTENSION WITH GOAL BLOOD PRESSURE LESS THAN 140/90: ICD-10-CM

## 2020-03-08 DIAGNOSIS — E03.9 HYPOTHYROIDISM, UNSPECIFIED TYPE: ICD-10-CM

## 2020-03-08 NOTE — LETTER
Dear Kenna,       Your provider has sent a 30 day leslie refill of  lisinopril (ZESTRIL) 10 MG tablet . You are due for an appointment for further refills. Appointment options could include: an in person office visit, telephone visit or Evisit through [facility]. Please contact the clinic to schedule an appointment for further refills.     Sincerely,     [facility]/t

## 2020-03-11 RX ORDER — LISINOPRIL 10 MG/1
TABLET ORAL
Qty: 90 TABLET | Refills: 0 | Status: SHIPPED | OUTPATIENT
Start: 2020-03-11 | End: 2020-03-16

## 2020-03-16 RX ORDER — LEVOTHYROXINE SODIUM 175 UG/1
175 TABLET ORAL DAILY
Qty: 90 TABLET | Refills: 0 | Status: SHIPPED | OUTPATIENT
Start: 2020-03-16 | End: 2020-06-16

## 2020-03-16 RX ORDER — LISINOPRIL 10 MG/1
10 TABLET ORAL DAILY
Qty: 90 TABLET | Refills: 0 | Status: SHIPPED | OUTPATIENT
Start: 2020-03-16 | End: 2020-06-16

## 2020-03-16 NOTE — TELEPHONE ENCOUNTER
Reason for Call:  Other prescription    Detailed comments: Patient would like both of her medications for lisinopril and levothyroxine to be sent to a [facility] in [place]. Did not specify where.    Phone Number Patient can be reached at: Home number on file [phone] (home)    Best Time: any    Can we leave a detailed message on this number? YES    Call taken on 3/16/2020 at 11:31 AM by Deven Sotomayor

## 2020-03-16 NOTE — TELEPHONE ENCOUNTER
Patient is returning call regarding previous message . She will contact the pharmacy [facility] in [place] and call back with their information.

## 2020-03-16 NOTE — TELEPHONE ENCOUNTER
Please send to [facility] in [place], [place].    Phone [phone]    Fax [phone]

## 2020-03-24 ENCOUNTER — COMMUNICATION - HEALTHEAST (OUTPATIENT)
Dept: SURGERY | Facility: CLINIC | Age: 65
End: 2020-03-24

## 2020-03-25 ENCOUNTER — OFFICE VISIT - HEALTHEAST (OUTPATIENT)
Dept: SURGERY | Facility: CLINIC | Age: 65
End: 2020-03-25

## 2020-03-25 DIAGNOSIS — K91.2 POSTOPERATIVE MALABSORPTION: ICD-10-CM

## 2020-03-25 ASSESSMENT — MIFFLIN-ST. JEOR: SCORE: 1236.69

## 2020-05-06 ENCOUNTER — OFFICE VISIT - HEALTHEAST (OUTPATIENT)
Dept: SURGERY | Facility: CLINIC | Age: 65
End: 2020-05-06

## 2020-05-06 DIAGNOSIS — E66.811 OBESITY, CLASS I, BMI 30.0-34.9 (SEE ACTUAL BMI): ICD-10-CM

## 2020-05-06 DIAGNOSIS — Z71.3 NUTRITIONAL COUNSELING: ICD-10-CM

## 2020-05-06 DIAGNOSIS — Z98.84 BARIATRIC SURGERY STATUS: ICD-10-CM

## 2020-05-06 ASSESSMENT — MIFFLIN-ST. JEOR: SCORE: 1236.69

## 2020-05-28 ENCOUNTER — COMMUNICATION - HEALTHEAST (OUTPATIENT)
Dept: ADMINISTRATIVE | Facility: CLINIC | Age: 65
End: 2020-05-28

## 2020-06-05 ENCOUNTER — OFFICE VISIT - HEALTHEAST (OUTPATIENT)
Dept: SURGERY | Facility: CLINIC | Age: 65
End: 2020-06-05

## 2020-06-05 ENCOUNTER — COMMUNICATION - HEALTHEAST (OUTPATIENT)
Dept: SURGERY | Facility: CLINIC | Age: 65
End: 2020-06-05

## 2020-06-05 DIAGNOSIS — E11.9 TYPE 2 DIABETES MELLITUS WITHOUT COMPLICATION, WITHOUT LONG-TERM CURRENT USE OF INSULIN (H): ICD-10-CM

## 2020-06-05 DIAGNOSIS — K91.2 POSTOPERATIVE MALABSORPTION: ICD-10-CM

## 2020-06-05 DIAGNOSIS — E78.5 DYSLIPIDEMIA: ICD-10-CM

## 2020-06-05 ASSESSMENT — MIFFLIN-ST. JEOR: SCORE: 1236.69

## 2020-06-15 ENCOUNTER — TELEPHONE (OUTPATIENT)
Dept: FAMILY MEDICINE | Facility: CLINIC | Age: 65
End: 2020-06-15

## 2020-06-15 NOTE — PROGRESS NOTES
"Kenna Colindres is a 65 year old female who is being evaluated via a billable telephone visit.      The patient has been notified of following:     \"This telephone visit will be conducted via a call between you and your physician/provider. We have found that certain health care needs can be provided without the need for a physical exam.  This service lets us provide the care you need with a short phone conversation.  If a prescription is necessary we can send it directly to your pharmacy.  If lab work is needed we can place an order for that and you can then stop by our lab to have the test done at a later time.    Telephone visits are billed at different rates depending on your insurance coverage. During this emergency period, for some insurers they may be billed the same as an in-person visit.  Please reach out to your insurance provider with any questions.    If during the course of the call the physician/provider feels a telephone visit is not appropriate, you will not be charged for this service.\"    Patient has given verbal consent for Telephone visit?  Yes    What phone number would you like to be contacted at? [phone]    How would you like to obtain your AVS? Mail a copy    Subjective     Kenna Colindres is a 65 year old female who presents via phone visit today for the following health issues:    HPI  Hypertension Follow-up  Patient has no concerns needing refills, did have blood work done by rheumatology for creatinine, will check BMP.    Do you check your blood pressure regularly outside of the clinic? No     Are you following a low salt diet? Yes    Are your blood pressures ever more than 140 on the top number (systolic) OR more   than 90 on the bottom number (diastolic), for example 140/90? No    Hypothyroidism Follow-up     No new symptoms, needing refills, not had TSH done in a while..    Since last visit, patient describes the following symptoms: Weight stable, no hair loss, no skin changes, no " constipation, no loose stools      How many servings of fruits and vegetables do you eat daily?  2-3    On average, how many sweetened beverages do you drink each day (Examples: soda, juice, sweet tea, etc.  Do NOT count diet or artificially sweetened beverages)?   1    How many days per week do you exercise enough to make your heart beat faster? 7    How many minutes a day do you exercise enough to make your heart beat faster? 30 - 60    How many days per week do you miss taking your medication? 0    Due:    Wellness   FIT, A1c, BMP. LDL, Foot Exam  Has follow-up at [facility]; this coming week        Assessment/Plan:  1. Essential hypertension with goal blood pressure less than 140/90     BMP and refill medication     - **Basic metabolic panel FUTURE anytime; Future     - lisinopril (ZESTRIL) 10 MG tablet; Take 1 tablet (10 mg) by mouth daily  Dispense: 90 tablet; Refill: 1    2. Hypothyroidism, unspecified type   TSH     - **TSH with free T4 reflex FUTURE anytime; Future     - levothyroxine (SYNTHROID/LEVOTHROID) 175 MCG tablet; Take 1 tablet (175 mcg) by mouth daily  Dispense: 90 tablet; Refill: 1  )TSH level: 1.02 6/16/20([facility])    3. Hyperlipidemia LDL goal <100       - Lipid panel reflex to direct LDL Fasting; Future    4. Rheumatoid arthritis involving multiple sites, unspecified rheumatoid factor presence (H)      -   Following with rheumatologist    Return in about 1 month (around 7/16/2020) for Wellness follow up.    Phone call duration: 12 minutes    Perry Kumar MD

## 2020-06-15 NOTE — TELEPHONE ENCOUNTER
She is overdue for a visit  Has not been seen for over 1 year  A leslie refill had already been given to her last time  She needs a visit to address any further refills    Please schedule a virtual visit with one of our providers to discuss    Brittney Hyde RN

## 2020-06-15 NOTE — TELEPHONE ENCOUNTER
Reason for Call: Request for an order or referral:    Order or referral being requested: labs    Date needed: as soon as possible    Has the patient been seen by the PCP for this problem? YES    Additional comments: [facility]. NEEDS BY Thursday 6/18/20. Refill for Levothyroxine   sodium, lisinopril       Phone number Patient can be reached at:  Home number on file [phone] (home)    Best Time:  any    Can we leave a detailed message on this number?  YES    Call taken on 6/15/2020 at 11:08 AM by John Hawley

## 2020-06-15 NOTE — TELEPHONE ENCOUNTER
Spoke to patient and made appointment for 6/16/2020.  Shanna CHAN CMA ([facility])

## 2020-06-16 ENCOUNTER — VIRTUAL VISIT (OUTPATIENT)
Dept: FAMILY MEDICINE | Facility: CLINIC | Age: 65
End: 2020-06-16
Payer: COMMERCIAL

## 2020-06-16 DIAGNOSIS — E78.5 HYPERLIPIDEMIA LDL GOAL <100: ICD-10-CM

## 2020-06-16 DIAGNOSIS — E03.9 HYPOTHYROIDISM, UNSPECIFIED TYPE: ICD-10-CM

## 2020-06-16 DIAGNOSIS — E66.01 MORBID (SEVERE) OBESITY DUE TO EXCESS CALORIES (H): ICD-10-CM

## 2020-06-16 DIAGNOSIS — M06.9 RHEUMATOID ARTHRITIS INVOLVING MULTIPLE SITES, UNSPECIFIED RHEUMATOID FACTOR PRESENCE: ICD-10-CM

## 2020-06-16 DIAGNOSIS — E11.65 TYPE 2 DIABETES MELLITUS WITH HYPERGLYCEMIA, WITHOUT LONG-TERM CURRENT USE OF INSULIN (H): ICD-10-CM

## 2020-06-16 DIAGNOSIS — I10 ESSENTIAL HYPERTENSION WITH GOAL BLOOD PRESSURE LESS THAN 140/90: Primary | ICD-10-CM

## 2020-06-16 PROCEDURE — 99214 OFFICE O/P EST MOD 30 MIN: CPT | Mod: 95 | Performed by: FAMILY MEDICINE

## 2020-06-16 RX ORDER — LEVOTHYROXINE SODIUM 175 UG/1
175 TABLET ORAL DAILY
Qty: 90 TABLET | Refills: 1 | Status: SHIPPED | OUTPATIENT
Start: 2020-06-16 | End: 2020-12-15

## 2020-06-16 RX ORDER — LISINOPRIL 10 MG/1
10 TABLET ORAL DAILY
Qty: 90 TABLET | Refills: 1 | Status: SHIPPED | OUTPATIENT
Start: 2020-06-16 | End: 2020-12-15

## 2020-06-18 ENCOUNTER — TRANSFERRED RECORDS (OUTPATIENT)
Dept: HEALTH INFORMATION MANAGEMENT | Facility: CLINIC | Age: 65
End: 2020-06-18

## 2020-06-18 ENCOUNTER — AMBULATORY - HEALTHEAST (OUTPATIENT)
Dept: LAB | Facility: HOSPITAL | Age: 65
End: 2020-06-18

## 2020-06-18 DIAGNOSIS — E03.9 HYPOTHYROIDISM, ADULT: ICD-10-CM

## 2020-06-18 DIAGNOSIS — K91.2 POSTSURGICAL MALABSORPTION: ICD-10-CM

## 2020-06-18 DIAGNOSIS — K90.9 INTESTINAL MALABSORPTION, UNSPECIFIED: ICD-10-CM

## 2020-06-18 DIAGNOSIS — Z98.84 S/P GASTRIC BYPASS: ICD-10-CM

## 2020-06-18 LAB
ALBUMIN SERPL-MCNC: 3.9 G/DL (ref 3.5–5)
ALP SERPL-CCNC: 96 U/L (ref 45–120)
ALT SERPL W P-5'-P-CCNC: 19 U/L (ref 0–45)
ANION GAP SERPL CALCULATED.3IONS-SCNC: 10 MMOL/L (ref 5–18)
AST SERPL W P-5'-P-CCNC: 17 U/L (ref 0–40)
BILIRUB SERPL-MCNC: 0.6 MG/DL (ref 0–1)
BUN SERPL-MCNC: 11 MG/DL (ref 8–22)
CALCIUM SERPL-MCNC: 9.1 MG/DL (ref 8.5–10.5)
CHLORIDE BLD-SCNC: 103 MMOL/L (ref 98–107)
CHOLEST SERPL-MCNC: 254 MG/DL
CO2 SERPL-SCNC: 27 MMOL/L (ref 22–31)
CREAT SERPL-MCNC: 0.84 MG/DL (ref 0.6–1.1)
ERYTHROCYTE [DISTWIDTH] IN BLOOD BY AUTOMATED COUNT: 12.7 % (ref 11–14.5)
FASTING STATUS PATIENT QL REPORTED: YES
FERRITIN SERPL-MCNC: 35 NG/ML (ref 10–130)
FOLATE SERPL-MCNC: 18.9 NG/ML
GFR SERPL CREATININE-BSD FRML MDRD: >60 ML/MIN/1.73M2
GLUCOSE BLD-MCNC: 201 MG/DL (ref 70–125)
HBA1C MFR BLD: 8.7 %
HCT VFR BLD AUTO: 41.1 % (ref 35–47)
HDLC SERPL-MCNC: 52 MG/DL
HGB BLD-MCNC: 13.6 G/DL (ref 12–16)
LDLC SERPL CALC-MCNC: 168 MG/DL
MCH RBC QN AUTO: 30 PG (ref 27–34)
MCHC RBC AUTO-ENTMCNC: 33.1 G/DL (ref 32–36)
MCV RBC AUTO: 91 FL (ref 80–100)
PLATELET # BLD AUTO: 186 THOU/UL (ref 140–440)
PMV BLD AUTO: 11 FL (ref 8.5–12.5)
POTASSIUM BLD-SCNC: 4.1 MMOL/L (ref 3.5–5)
PROT SERPL-MCNC: 6.9 G/DL (ref 6–8)
PTH-INTACT SERPL-MCNC: 67 PG/ML (ref 10–86)
RBC # BLD AUTO: 4.53 MILL/UL (ref 3.8–5.4)
SODIUM SERPL-SCNC: 140 MMOL/L (ref 136–145)
TRIGL SERPL-MCNC: 172 MG/DL
TSH SERPL DL<=0.005 MIU/L-ACNC: 1.02 UIU/ML (ref 0.3–5)
TSH SERPL-ACNC: 1.02 UIU/ML (ref 0.3–5)
VIT B12 SERPL-MCNC: >2000 PG/ML (ref 213–816)
WBC: 5.2 THOU/UL (ref 4–11)

## 2020-06-19 LAB — 25(OH)D3 SERPL-MCNC: 27.7 NG/ML (ref 30–80)

## 2020-06-20 LAB — ZINC SERPL-MCNC: 85.1 UG/DL (ref 60–120)

## 2020-06-21 LAB
ANNOTATION COMMENT IMP: NORMAL
VIT A SERPL-MCNC: 0.74 MG/L (ref 0.3–1.2)
VIT B1 PYROPHOSHATE BLD-SCNC: 110 NMOL/L (ref 70–180)
VITAMIN A (RETINYL PALMITATE): 0.04 MG/L (ref 0–0.1)

## 2020-06-29 ENCOUNTER — COMMUNICATION - HEALTHEAST (OUTPATIENT)
Dept: SURGERY | Facility: CLINIC | Age: 65
End: 2020-06-29

## 2020-11-05 ENCOUNTER — VIRTUAL VISIT (OUTPATIENT)
Dept: FAMILY MEDICINE | Facility: CLINIC | Age: 65
End: 2020-11-05
Payer: COMMERCIAL

## 2020-11-05 DIAGNOSIS — Z98.84 HISTORY OF GASTRIC BYPASS: ICD-10-CM

## 2020-11-05 DIAGNOSIS — R30.0 DYSURIA: Primary | ICD-10-CM

## 2020-11-05 DIAGNOSIS — E11.65 TYPE 2 DIABETES MELLITUS WITH HYPERGLYCEMIA, WITHOUT LONG-TERM CURRENT USE OF INSULIN (H): ICD-10-CM

## 2020-11-05 PROCEDURE — 99214 OFFICE O/P EST MOD 30 MIN: CPT | Mod: 95 | Performed by: NURSE PRACTITIONER

## 2020-11-05 NOTE — PROGRESS NOTES
"Kenna Colindres is a 65 year old female who is being evaluated via a billable telephone visit.      The patient has been notified of following:     \"This telephone visit will be conducted via a call between you and your physician/provider. We have found that certain health care needs can be provided without the need for a physical exam.  This service lets us provide the care you need with a short phone conversation.  If a prescription is necessary we can send it directly to your pharmacy.  If lab work is needed we can place an order for that and you can then stop by our lab to have the test done at a later time.    Telephone visits are billed at different rates depending on your insurance coverage. During this emergency period, for some insurers they may be billed the same as an in-person visit.  Please reach out to your insurance provider with any questions.    If during the course of the call the physician/provider feels a telephone visit is not appropriate, you will not be charged for this service.\"    Patient has given verbal consent for Telephone visit?  Yes    What phone number would you like to be contacted at? [phone]    How would you like to obtain your AVS? Andreyt    Subjective     Kenna Colindres is a 65 year old female who presents via phone visit today for the following health issues:    HPI     Genitourinary - Female  Onset/Duration: 1 day  Description:   Painful urination (Dysuria): YES           Frequency: YES  Blood in urine (Hematuria): no  Delay in urine (Hesitency): no  Intensity: moderate  Progression of Symptoms:  worsening  Accompanying Signs & Symptoms:  Fever/chills: no  Flank pain: no  Nausea and vomiting: no  Vaginal symptoms: none  Abdominal/Pelvic Pain: no  History:   History of frequent UTI s: YES- will get UTI every 1.5 years  History of kidney stones: no  Sexually Active: no  Possibility of pregnancy: No  Precipitating or alleviating factors: None  Therapies tried and outcome: " Medication Refilled per verbal order Dr. Oviedo, prescription escribed to Patient's Pharmacy via computer.        none         Diabetes Follow-up      How often are you checking your blood sugar? Not at all    What concerns do you have today about your diabetes? None     Do you have any of these symptoms? (Select all that apply) refused    Have you had a diabetic eye exam in the last 12 months?unknown        BP Readings from Last 2 Encounters:   09/16/19 104/70   06/12/19 123/76     Hemoglobin A1C (%)   Date Value   05/09/2019 6.9 (H)   12/07/2018 6.9 (H)     LDL Cholesterol Calculated (mg/dL)   Date Value   05/09/2019 102 (H)   10/29/2018 96           How many servings of fruits and vegetables do you eat daily?  0-1    On average, how many sweetened beverages do you drink each day (Examples: soda, juice, sweet tea, etc.  Do NOT count diet or artificially sweetened beverages)?   0    How many days per week do you exercise enough to make your heart beat faster? 7    How many minutes a day do you exercise enough to make your heart beat faster? 30 - 60    How many days per week do you miss taking your medication? 0      Review of Systems   Constitutional, HEENT, cardiovascular, pulmonary, gi and gu systems are negative, except as otherwise noted.       Objective          Vitals:  No vitals were obtained today due to virtual visit.    healthy, alert and no distress  PSYCH: Alert and oriented times 3; coherent speech, normal   rate and volume, able to articulate logical thoughts, able   to abstract reason, no tangential thoughts, no hallucinations   or delusions  Her affect is normal and pleasant  RESP: No cough, no audible wheezing, able to talk in full sentences  Remainder of exam unable to be completed due to telephone visits    No results found for this or any previous visit (from the past 24 hour(s)).        Assessment/Plan:    Assessment & Plan     Dysuria  getting UA, treat as indicated.  - *UA reflex to Microscopic and Culture ([facility] (except [place] and [place])    Type 2 diabetes mellitus with  "hyperglycemia, without long-term current use of insulin (H)  Patient is due for A1c but refuses to have blood drawn, refuses to check sugars. States she\" had gastric bipass for a 2nd time to avoid having to poke herself.\" she has reportedluy lost 100+ lbs. She has appt with CDE in early December    History of gastric bypass            Work on weight loss  Regular exercise  See Patient Instructions    No follow-ups on file.    HELENA Elizalde [facility]    Phone call duration:  17 minutes              "

## 2020-11-05 NOTE — PATIENT INSTRUCTIONS
Patient Education     Dysuria  Dysuria is when you have pain during urination. It is often described as a burning feeling. Learn more about this problem and how it can be treated.      Painful urination (dysuria) is often caused by a problem in the urinary tract.   What causes dysuria?  Possible causes include:    Infection with a bacteria or virus such as a urinary tract infection (UTI) or a sexually transmitted infection (STI)    Sensitivity or allergy to chemicals such as those found in lotions and other products    Prostate or bladder problems    Radiation therapy to the pelvic area  How is dysuria diagnosed?  Your healthcare provider will examine you. He or she will ask about your symptoms and health. After talking with you and doing a physical exam, your healthcare provider may know what is causing your dysuria. You will often have to give a urine sample. Tests of your urine (urinalysis) are done. A urinalysis may include:     Looking at the urine sample (visual exam)    Checking for substances (chemical exam)    Looking at a small amount of the urine under a microscope (microscopic exam)  Some parts of the urinalysis may be done in the provider's office and some in a lab. The urine sample may also be checked for bacteria and yeast (urine culture). Your healthcare provider will tell you more about these tests if they are needed.   How is dysuria treated?  Treatment depends on the cause. If you have a bacterial infection, you may need antibiotics. You may be given medicines to make it easier for you to urinate and help ease pain. Your healthcare provider can tell you more about your treatment options. If not treated, symptoms may get worse.   When to call your healthcare provider  Call the healthcare provider right away if you have any of the following:     Fever of  100.4  F ( 38 C) or higher, or as directed by your provider    No improvement after 3 days of treatment    Trouble urinating because of  pain    New or increased discharge from the vagina or penis    Rash or joint pain    Increased back or belly pain    Enlarged painful lymph nodes (lumps) in the groin  [employer] last reviewed this educational content on 4/1/2019 2000-2020 [employer] [address] [place] [address]. All rights reserved. This information is not intended as a substitute for professional medical care. Always follow your healthcare professional's instructions.

## 2020-11-05 NOTE — PROGRESS NOTES
Mailing AVS to patient's address listed in the chart. This will go out in tomorrow's mail.  [facility]  2nd Floor  Primary Care

## 2020-11-10 ENCOUNTER — TELEPHONE (OUTPATIENT)
Dept: FAMILY MEDICINE | Facility: CLINIC | Age: 65
End: 2020-11-10

## 2020-11-10 DIAGNOSIS — N39.0 UTI (URINARY TRACT INFECTION), BACTERIAL: Primary | ICD-10-CM

## 2020-11-10 DIAGNOSIS — A49.9 UTI (URINARY TRACT INFECTION), BACTERIAL: Primary | ICD-10-CM

## 2020-11-10 NOTE — TELEPHONE ENCOUNTER
Patient is calling about her virtual visit with you for UTI  She was waiting for some medication to be called in    Unsure of direction to take, she is feeling worse with symptoms  Back pain and blood in urine    Call to advise on medication asap   [phone] ok to leave message

## 2020-11-11 DIAGNOSIS — R30.0 DYSURIA: ICD-10-CM

## 2020-11-11 DIAGNOSIS — R82.90 NONSPECIFIC FINDING ON EXAMINATION OF URINE: Primary | ICD-10-CM

## 2020-11-11 LAB
ALBUMIN UR-MCNC: NEGATIVE MG/DL
APPEARANCE UR: ABNORMAL
BACTERIA #/AREA URNS HPF: ABNORMAL /HPF
BILIRUB UR QL STRIP: NEGATIVE
COLOR UR AUTO: YELLOW
GLUCOSE UR STRIP-MCNC: >=1000 MG/DL
HGB UR QL STRIP: ABNORMAL
KETONES UR STRIP-MCNC: NEGATIVE MG/DL
LEUKOCYTE ESTERASE UR QL STRIP: ABNORMAL
NITRATE UR QL: NEGATIVE
PH UR STRIP: 5.5 PH (ref 5–7)
RBC #/AREA URNS AUTO: ABNORMAL /HPF
SOURCE: ABNORMAL
SP GR UR STRIP: 1.01 (ref 1–1.03)
UROBILINOGEN UR STRIP-ACNC: 0.2 EU/DL (ref 0.2–1)
WBC #/AREA URNS AUTO: >100 /HPF
WBC CLUMPS #/AREA URNS HPF: PRESENT /HPF

## 2020-11-11 PROCEDURE — 87086 URINE CULTURE/COLONY COUNT: CPT | Performed by: NURSE PRACTITIONER

## 2020-11-11 PROCEDURE — 81001 URINALYSIS AUTO W/SCOPE: CPT | Performed by: NURSE PRACTITIONER

## 2020-11-11 RX ORDER — CEPHALEXIN 500 MG/1
500 CAPSULE ORAL 3 TIMES DAILY
Qty: 21 CAPSULE | Refills: 0 | Status: SHIPPED | OUTPATIENT
Start: 2020-11-11 | End: 2020-11-18

## 2020-11-11 NOTE — TELEPHONE ENCOUNTER
Patient here for lab appt today, 11/11/2020,  at 9:30 am.  [facility]  2nd Floor  Primary Care

## 2020-11-11 NOTE — TELEPHONE ENCOUNTER
S-(situation): increased pain with urination and blood in urine, some chills, lower and mid back pain.    B-(background): virtual appointment on 11/5. No medication given. Patient notes that she held for 40 minutes trying to schedule lab and ran out of minutes.    A-(assessment): stable. Talking and not noting extreme pain.    R-(recommendations): scheduled at 9:30 for the UA that was ordered on 11/5/2020.    Sent to providers to address as patient wants addressed ASAP and visit provider MARILUZ.    Jen Mcguire RN, [facility] Triage

## 2020-11-11 NOTE — TELEPHONE ENCOUNTER
Antibiotic sent to the pharmacy. If you could schedule her a lab visit so she can drop off a urine sample before she starts the antibiotic,  that would be ideal. But if not the antibiotic is at the pharmacy.     Virgil Berry PA-C

## 2020-11-11 NOTE — TELEPHONE ENCOUNTER
Patient needs another provider to order her medication for UTI  Her visit was on 11/5 - almost a week    She is not feeling better at all    Call her to advise as soon as possible  [phone]  Leave a message

## 2020-11-11 NOTE — TELEPHONE ENCOUNTER
Patient UA/UC already done. She was prescheduled before sending note.     Patient informed of the antibiotic sent and to which pharmacy. She was also informed if not the correct antibiotic per culture that she will receive a call in around 72 business hours.    Jen Mcguire RN, [facility] Triage

## 2020-11-12 LAB
BACTERIA SPEC CULT: NO GROWTH
Lab: NORMAL
SPECIMEN SOURCE: NORMAL

## 2020-12-04 ENCOUNTER — OFFICE VISIT - HEALTHEAST (OUTPATIENT)
Dept: SURGERY | Facility: CLINIC | Age: 65
End: 2020-12-04

## 2020-12-04 DIAGNOSIS — E66.811 OBESITY, CLASS I, BMI 30.0-34.9 (SEE ACTUAL BMI): ICD-10-CM

## 2020-12-04 DIAGNOSIS — Z98.84 BARIATRIC SURGERY STATUS: ICD-10-CM

## 2020-12-04 DIAGNOSIS — E11.9 TYPE 2 DIABETES MELLITUS WITHOUT COMPLICATION, WITHOUT LONG-TERM CURRENT USE OF INSULIN (H): ICD-10-CM

## 2020-12-04 DIAGNOSIS — Z71.3 NUTRITIONAL COUNSELING: ICD-10-CM

## 2020-12-04 ASSESSMENT — MIFFLIN-ST. JEOR: SCORE: 1236.69

## 2020-12-12 DIAGNOSIS — I10 ESSENTIAL HYPERTENSION WITH GOAL BLOOD PRESSURE LESS THAN 140/90: ICD-10-CM

## 2020-12-12 DIAGNOSIS — E03.9 HYPOTHYROIDISM, UNSPECIFIED TYPE: ICD-10-CM

## 2020-12-14 ENCOUNTER — HEALTH MAINTENANCE LETTER (OUTPATIENT)
Age: 65
End: 2020-12-14

## 2020-12-15 RX ORDER — LEVOTHYROXINE SODIUM 175 UG/1
TABLET ORAL
Qty: 90 TABLET | Refills: 0 | Status: SHIPPED | OUTPATIENT
Start: 2020-12-15 | End: 2020-12-22

## 2020-12-15 RX ORDER — LISINOPRIL 10 MG/1
TABLET ORAL
Qty: 90 TABLET | Refills: 0 | Status: SHIPPED | OUTPATIENT
Start: 2020-12-15 | End: 2020-12-22

## 2020-12-16 NOTE — TELEPHONE ENCOUNTER
Prescription approved per [facility].  Diane Rushing RN

## 2020-12-19 DIAGNOSIS — E03.9 HYPOTHYROIDISM, UNSPECIFIED TYPE: ICD-10-CM

## 2020-12-19 DIAGNOSIS — I10 ESSENTIAL HYPERTENSION WITH GOAL BLOOD PRESSURE LESS THAN 140/90: ICD-10-CM

## 2020-12-22 RX ORDER — LISINOPRIL 10 MG/1
TABLET ORAL
Qty: 90 TABLET | Refills: 0 | Status: SHIPPED | OUTPATIENT
Start: 2020-12-22 | End: 2021-07-23

## 2020-12-22 RX ORDER — LEVOTHYROXINE SODIUM 175 UG/1
TABLET ORAL
Qty: 90 TABLET | Refills: 0 | Status: SHIPPED | OUTPATIENT
Start: 2020-12-22 | End: 2021-07-02

## 2021-02-16 ENCOUNTER — VIRTUAL VISIT (OUTPATIENT)
Dept: URGENT CARE | Facility: CLINIC | Age: 66
End: 2021-02-16
Payer: COMMERCIAL

## 2021-02-16 DIAGNOSIS — Z71.84 TRAVEL ADVICE ENCOUNTER: Primary | ICD-10-CM

## 2021-02-16 PROCEDURE — U0003 INFECTIOUS AGENT DETECTION BY NUCLEIC ACID (DNA OR RNA); SEVERE ACUTE RESPIRATORY SYNDROME CORONAVIRUS 2 (SARS-COV-2) (CORONAVIRUS DISEASE [COVID-19]), AMPLIFIED PROBE TECHNIQUE, MAKING USE OF HIGH THROUGHPUT TECHNOLOGIES AS DESCRIBED BY CMS-2020-01-R: HCPCS | Performed by: EMERGENCY MEDICINE

## 2021-02-16 PROCEDURE — U0005 INFEC AGEN DETEC AMPLI PROBE: HCPCS | Performed by: EMERGENCY MEDICINE

## 2021-02-16 PROCEDURE — 99212 OFFICE O/P EST SF 10 MIN: CPT | Mod: TEL | Performed by: EMERGENCY MEDICINE

## 2021-02-16 NOTE — PROGRESS NOTES
"HPI: Patient is a 65-year-old female who is flying on Saturday morning, i.e. 4 days, and needs a negative Covid test in order to fly.  She has had no symptoms such as cough, fever, shortness of breath.  She has had no Covid exposure.      ROS: See HPI otherwise normal.    Allergies   Allergen Reactions     Metrizamide      Other reaction(s): Other (See Comments)  \"almost  on the table\"     Sulfa Drugs Hives     Pt received sore in mouth  Pt received sore in mouth       Contrast Dye       Current Outpatient Medications   Medication Sig Dispense Refill     gabapentin (NEURONTIN) 300 MG capsule Take 1 capsule (300 mg) by mouth 3 times daily 270 capsule 1     levothyroxine (SYNTHROID/LEVOTHROID) 175 MCG tablet TAKE 1 TABLET BY MOUTH ONCE DAILY 90 tablet 0     lisinopril (ZESTRIL) 10 MG tablet TAKE 1 TABLET BY MOUTH ONCE DAILY 90 tablet 0     Multiple Vitamin (MULTIVITAMINS PO) Take by mouth daily       omeprazole (PRILOSEC OTC) 20 MG tablet Take 20 mg by mouth daily       pilocarpine (SALAGEN) 5 MG tablet Take 5 mg by mouth       tofacitinib (XELJANZ) 5 MG tablet 1/d with supper for 2 weeks and then 1 bid with food.           PE: No acute distress.  Alert.  Nondyspneic sounding.        TREATMENT: None.      ASSESSMENT: Covid test needed as clearance to fly.  Patient is asymptomatic with no history of exposure.      DIAGNOSIS: Covid testing needed to fly      PLAN: Covid PCR ordered.  Testing team to follow and get patient results.    Time: 10 minutes    "

## 2021-02-17 ENCOUNTER — AMBULATORY - HEALTHEAST (OUTPATIENT)
Dept: PHARMACY | Facility: HOSPITAL | Age: 66
End: 2021-02-17

## 2021-02-18 ENCOUNTER — TELEPHONE (OUTPATIENT)
Dept: FAMILY MEDICINE | Facility: CLINIC | Age: 66
End: 2021-02-18

## 2021-02-18 NOTE — TELEPHONE ENCOUNTER
Patient was transferred to triage.    She will be traveling and needs to bring her COVID test results with her  Please print COVID results from 2/16/2021  She would like to pick it up at the  at Talisha Mckay RN  [facility]

## 2021-02-18 NOTE — TELEPHONE ENCOUNTER
Printed results and brought downstairs. Called a spoke with  he is aware that this has been completed.   Jen HERNANDEZ MA

## 2021-04-18 ENCOUNTER — HEALTH MAINTENANCE LETTER (OUTPATIENT)
Age: 66
End: 2021-04-18

## 2021-05-01 ENCOUNTER — TRANSFERRED RECORDS (OUTPATIENT)
Dept: MULTI SPECIALTY CLINIC | Facility: CLINIC | Age: 66
End: 2021-05-01

## 2021-05-01 LAB — RETINOPATHY: NORMAL

## 2021-05-20 ENCOUNTER — COMMUNICATION - HEALTHEAST (OUTPATIENT)
Dept: SURGERY | Facility: CLINIC | Age: 66
End: 2021-05-20

## 2021-05-20 DIAGNOSIS — E11.9 TYPE 2 DIABETES MELLITUS WITHOUT COMPLICATION, WITHOUT LONG-TERM CURRENT USE OF INSULIN (H): ICD-10-CM

## 2021-05-20 DIAGNOSIS — K90.9 INTESTINAL MALABSORPTION, UNSPECIFIED: ICD-10-CM

## 2021-05-20 DIAGNOSIS — K91.2 POSTSURGICAL MALABSORPTION: ICD-10-CM

## 2021-05-20 DIAGNOSIS — E78.5 DYSLIPIDEMIA: ICD-10-CM

## 2021-05-20 DIAGNOSIS — Z98.84 S/P GASTRIC BYPASS: ICD-10-CM

## 2021-05-28 NOTE — PATIENT INSTRUCTIONS - HE
Your surgery is scheduled for 6/5/2019 at 9:30 am.  Please arrive at 7:30 am.      Medication Recommendations    Acetaminophen (Brand Name: Tylenol or MPAP)   You will likely be sent home on Tylenol to go with your other pain medications after surgery.  It is ok to cut/crush regular or extra strength tablets.  Make sure tablet is not long acting or extended release.  Do not take more than 3000mg in 24 hours.  If you decide to use liquid Tylenol at home, we recommend you use Adult strength because you will have to take less liquid to get the same effect.  Dilute the medication 1:1 with water before taking the liquid version to prevent dumping or stomach upset.    Albuterol Inhaler or Nebulizer (Brand Name: Pro-Air Inhaler/Ventolin)   Ok to use.  You are encouraged to bring to the hospital on the day of surgery.    Aspirin   Stop 7 days before date of surgery.  Max of 81mg enteric coated aspirin per day after surgery is ok. Usually may restart 48 hours after surgery.  Swallow enteric coated aspirin whole.    Empagliflozin (Brand Name:Jardiance)  See instructions on the  [facility] Bariatric Care: Diabetes Management for the Bariatric Surgery Patient  form    Gabapentin (Brand Name: Neurontin)   Ok to cut/crush if a tablet, or ok to open capsule.    Levothyroxine (Brand Name: Synthroid)  Small enough to swallow whole.  May cut/crush if needed.  *Take a.m. of surgery with a sip of water*    Liraglutide (Brand Name: Victoza)  Refer to instructions from the dietitian on when to take and stop prior to surgery.  See instructions on the  [facility] Bariatric Care: Diabetes Management for the Bariatric Surgery Patient  form    Lisinopril (Brand Name: Prinivil)   Ok to cut/crush.  Ask primary doctor how often to monitor your blood pressure after surgery, and if you need to change your dosage.  Do not take the morning of surgery.    Melatonin   Stop taking 2 weeks before surgery.  Usually can start taking 48 hours after surgery  if able to cut or crush the tablet, or may be able to open capsule.    Metformin XR (Brand Name: Glucophage XR)  Cannot be cut or crushed. Stop 2 days before surgery.  Please have primary doctor switch over to immediate release for after surgery.  See instructions on the  [facility]ariatric Care: Diabetes Management for the Bariatric Surgery Patient  form    Multivitamin with Iron   If not already taking, start chewable MVI with at least 18mg of iron and 10-15 mg of zinc twice a day at least 2 weeks prior to surgery and resume the day after surgery for life. Do not take the morning of surgery.    Omeprazole (Brand Name: Prilosec)   If not already taking, you will get a prescription to start when you get home from the hospital.  Tablets cannot be cut or crushed.  If a capsule, entire capsule contents may be sprinkled on a spoonful of applesauce and taken immediately without chewing.  If only on a full liquid diet, dump capsule contents into a spoonful of liquid and take without chewing.  May swallow whole again starting 6 weeks after surgery but can try swallowing sooner if having issues with opening into food.  Continue after surgery for at least 3 months.    Ursodiol (Brand name: Actigall)  Start two weeks after surgery.  Swallow whole with warm water, do not cut, crush, or open capsule up.  This is a medication that will help to prevent gallstones from forming during the first 6 months post-op of rapid weight loss.  Prescription was sent to your pharmacy.    Pilocarpine (Brand Name: Salagen)  Check with pharmacy if you can cut or crush if bigger than a quarter of an inch in size.    Pravastatin (Brand Name: Pravachol)   Ok to cut/crush if needed.    Sitagliptin (Brand Name: Januvia)   Ok to cut/crush. See instructions on the  [facility] Bariatric Care: Diabetes Management for the Bariatric Surgery Patient  form    Tocilizumad (Brand Name: Acetmra)  Stop injections two weeks prior to surgery per   Sravanthi.    Vitamin B12   If not already taking, start sublingual (under the tongue) vitamin B12 1,000 mcg at least 2 weeks prior to surgery and resume the day after surgery for life. Do not take the morning of surgery.    Vitamin D3 5000 IU   Ok to cut or crush tablet; if a softgel will likely need to swallow whole if small enough.  If capsule is too large, may need to take tablet form until able to swallow larger pills again.  Do not take the morning of surgery and don t restart again until instructed by the dietitian.      [facility]   Laparoscopic Lexi-en-Y Gastric Bypass, Gastric Sleeve & Single Anastomosis Duodenal Switch  Home Discharge Instructions      Coughing and Deep Breathing   Use your incentive spirometer frequently after you get home. Continued coughing and deep breathing help prevent complications such as fevers and pneumonia. If you are fever free after one week, stop using it, and discard it.    Incision and Dressing   All incisional coverings can get wet so you may continue to shower at home.  If you have Band-Aids, they should come off while in the hospital.  Remove all steri-strips one week from your surgery date unless told otherwise by the surgeon.  If you have gauze covered by a clear dressing, remove 2-3 days after surgery as directed by the surgeon.     Notify the clinic or your surgeon if:  You develop a fever orally of 101.5 or greater, see any unusual bright red or green infection-like drainage; see redness or swelling around the incision; have left shoulder pain or pain that does not go away with your narcotic; increasing anxiety or feeling that something is just  not right;  a persistent rapid heart rate (greater than or equal to 120 beats per minute) lasting longer than 15 minutes; progressive rapid breathing; increasing shortness of breath; continuous (non-stop) hiccups lasting longer than 15 minutes; persistent nausea; if you are unable to keep liquids down; have frothy  vomiting; difficulty swallowing; excessive bloating; swelling, warmth, discoloration or pain in your lower legs; dark, bright red, black, or tarry bowel movements; or anything you are concerned might be an urgent problem or need reassurance about.    Dehydration/Nausea/Vomiting  Vomiting is not normal after surgery and can be caused by drinking with meals, eating large portions, not chewing thoroughly and drinking large sips.  If you continue to have nausea and vomiting despite following our recommendations, call the clinic.  Nausea can be caused by dehydration so make sure you are drinking the suggested amount of fluids.  Symptoms of dehydration include dark colored urine, lack of energy, nausea, dizziness, headache and a bad taste in your mouth.  If you notice any of these symptoms, please don t delay in calling the clinic.  Early identification gives us more options for treatment.    Bowel Movements  Consider that your stools might be loose since you are currently only taking in fluids. Diarrhea may be caused by dumping syndrome if you had Gastric Bypass, so make sure you aren t drinking liquids containing excess sugar.  Otherwise, call the clinic if you have 3 or more diarrhea stools per day. If constipation is a problem, it is ok to use a Dulcolax  rectal suppository, Miralax or Milk of Magnesia . Other helpful ways to avoid constipation include: increasing your fluids; stop taking narcotic medications; and increasing your activity. Adding Benefiber  daily to your liquids once you have had a stool may help keep you regular until there is more natural fiber in your diet. You may also have foul smelling gas.    Risk for Blood Clots  For the next 6 weeks, if you are in any vehicle longer than 30 minutes, stop every 30 minutes, and walk for at least 3 minutes before continuing your journey. Traveling and/or flying are not recommended for 6 weeks.  If leaving the country within the first 3-6 months after surgery,  check with your surgeon first.    Activity  Do not lift greater than 20 lbs. for 2 weeks unless told differently by your surgeon. After two weeks, let your body be your guide. You may drive only after you have been completely off of narcotics for a minimum of 24 hours. Continue to shower as you have in the hospital. NO BATHING IN THE BATHTUB, SWIMMING, etc. for 2-4 weeks.  (You need to be sure your incisions are well healed to prevent infection.)    Pain Control  You will go home with a prescription for pain medication. If your pain does not go away with this medication, please notify your surgeon. If the pain requires medication, but not something as strong as the prescription, you may try Tylenol  (acetaminophen) cut  <    inch or crushed.   DO NOT USE PRESCRIBED OR OVER THE COUNTER NONSTEROIDAL ANTI-  INFLAMMATORY MEDICATION LIKE MOTRIN, ADVIL, IBUPROFEN OR ASPIRIN.    Acid Reducer and Gallbladder Medication  It is important to reduce the amount of acid in your new stomach for a couple months after surgery while it is healing.  We will prescribe an acid reducer that you should take daily.  It is important for you to let us know if you have any symptoms of heartburn or reflux.      For those of you who still have a gallbladder, we will also prescribe a medication called Actigall (ursodiol) and we recommend taking it twice a day for 6 months.  It is only effective if you take it twice a day.  You will start taking this two weeks after surgery.    ER Needs  If you need to go to the Emergency Room, we prefer you go to the [facility] ER.  Be sure to inform the ER staff that you are a bariatric surgery patient, and ask them to notify your surgeon that you are there.    Remember to refer to your bariatric program handbook and keep follow up appointments for long-term success. You will need regular labs to check your nutritional status and it is very important to take ALL your supplements and protein  religiously,    For urgent concerns on evenings, weekends & holidays, call the clinic and the message will direct you to the surgeon on call.  [facility]: [phone]

## 2021-05-28 NOTE — TELEPHONE ENCOUNTER
PT WOULD LIKE HER RECENT LAB VISIT FROM 5/20 RESULTS SENT OVER TO HER PCP DR YEH HIS FAX NUMBER [phone]

## 2021-05-28 NOTE — TELEPHONE ENCOUNTER
Pt called back and will have her A1C drawn at [facility] next week prior to her visit with  unless her PCP does it tomorrow at her appointment.     Nicki Núñez RN, [facility] Bariatric [facility]  P [phone]  F [phone]

## 2021-05-28 NOTE — TELEPHONE ENCOUNTER
Pt needs her A1C rechecked either before or after her visit with  next week.  Order placed and called pt to let her know.  Left a message asking her to call back.    Nicki Núñez RN, N  [facility] and Bariatric [facility]  P [phone]  F [phone]

## 2021-05-28 NOTE — PROGRESS NOTES
Patient attended group class to review pre-op instructions and dietary plan for upcoming surgery.    Pt will begin 2-week pre-op liquid diet 5/22/2019, will do clear liquids the day before surgery. Pt is scheduled for revision to RNY on 6/5/2019, and will then follow 1 week post-op liquid diet. Pt will f/u with RD 1-week post-op for further diet advancement.    Educated pt on 2-week pre-op and 1-week post-op liquid diets. Discussed appropriate liquids and demonstrated portions for each of the food groupings during each diet phase. Reviewed appropriate calories/protein/fluid goals during 2-week pre-op liquid diet. Educated on correct vitamins/minerals to take after surgery in correct dosage and frequency. Provided grocery list and sample menu plan for each diet stage, as well as unflavored protein powder samples.       Discussed admission process and hospital course.  Pharmacy information packet given and explained. Patient was given exercises to work on post-op for maintaining muscle mass and strengthening that was created by [facility].  Bariatric quiz given to patient for a review on their own.  Discharge instructions, information card and follow up appointments given and reviewed with pt at this time and patient verbalized understanding. She will have her H&P with her primary which she still needs to schedule. Orders placed for her to take with her.    Kasia Goldberg, RN, CBN

## 2021-05-28 NOTE — TELEPHONE ENCOUNTER
Result routed to pt's PCP.    Nicki Núñez RN, CBN  [facility] and Bariat[facility]  P [phone]  F [phone]

## 2021-05-28 NOTE — PROGRESS NOTES
"HPI: Pt is here today for follow-up of her vertical banded gastroplasty.  She has a known gastro-gastric fistula.  She is also type II diabetic and that has been a little bit out of control.  She seen Dr. costa for that.  She states part of the issue is that some of her medications are not covered by insurance so she has not been taking them regularly.  She has not been checking her blood sugars.  She has just gotten back from spending the winter in [place].  She is being seen by Dr. costa for management of her diabetes and wishes to move forward with converting her VBG to a Lexi-en-Y gastric bypass.  Please see my previous notes.  She has not had any significant changes to her past medical history other than her diabetes is somewhat worse.  She does have a history of a ventral hernia repair with mesh in place as well as an open cholecystectomy in the past.  These may complicate converting a Lexi-en-Y to a gastric bypass.    CURRENT MEDS:  Current Outpatient Medications   Medication Sig Dispense Refill     albuterol (PROAIR HFA;PROVENTIL HFA;VENTOLIN HFA) 90 mcg/actuation inhaler Inhale 2 puffs daily as needed.       aspirin 81 MG EC tablet Take 1 tablet by mouth daily.       empagliflozin 25 mg Tab Take 25 mg by mouth daily.       gabapentin (NEURONTIN) 300 MG capsule Take 300 mg by mouth 3 (three) times a day.       levothyroxine (SYNTHROID, LEVOTHROID) 150 MCG tablet Take 150 mcg by mouth daily.       liraglutide (VICTOZA) 0.6 mg/0.1 mL (18 mg/3 mL) PnIj injection Inject 1.8 mg under the skin daily. 27 mL prn     lisinopril (PRINIVIL,ZESTRIL) 10 MG tablet Take 10 mg by mouth daily.       melatonin 5 mg Subl Place 1 tablet under the tongue at bedtime.       metFORMIN (GLUCOPHAGE-XR) 500 MG 24 hr tablet Take 1,000 mg by mouth 2 (two) times a day.       MULTIVITAMIN WITH IRON ORAL Take 1 tablet by mouth daily.       omeprazole 20 mg TbEC Take 20 mg by mouth daily.       pen needle, diabetic 32 gauge x 5/32\" Ndle Use a new " "pen needle for each injection. 90 each prn     pilocarpine (SALAGEN) 5 MG tablet Take 5 mg by mouth daily.       pravastatin (PRAVACHOL) 40 MG tablet Take 1 tablet by mouth daily.       sitaGLIPtin (JANUVIA) 100 MG tablet Take 100 mg by mouth daily.       tocilizumab (ACTEMRA) 162 mg/0.9 mL Syrg subcutaneous injection Inject 162 mg under the skin once a week.       No current facility-administered medications for this visit.          /68   Pulse 75   Resp 18   Ht 5' 2\" (1.575 m)   Wt 198 lb (89.8 kg)   SpO2 100%   BMI 36.21 kg/m    Wt Readings from Last 1 Encounters:   05/14/19 198 lb (89.8 kg)     Body mass index is 36.21 kg/m .    EXAM:  GENERAL: Appears well  ABDOMEN: Soft, nontender.  No evidence of a recurrent ventral hernia    Assessment/Plan: Patient with a previous vertical banded gastroplasty now with a gastro-gastric fistula.  She also has diabetes that is a bit out of control at this point and being managed by Dr. costa.  We will plan on getting her diabetes better managed.  I think that proceeding with a conversion to a Lexi-en-Y gastric bypass would be her best option.  She is in agreement with that.  I went over the operation itself and discussed the surgery and some of the more long-term side effects of a Lexi-en-Y gastric bypass including malnutrition, dumping syndrome and marginal ulcer.  I went over the surgery itself with her and discussed some of the risk of surgery including but not limited to DVT, pulmonary embolus, pneumonia, bleeding, wound infection, staple line leak and possible death.  I discussed with her this is revisional surgery which carries higher risk involved with that than the first time around.  She and her  both understand and wish to proceed with surgery.  Will send this forward for prior authorization and is okay to schedule after we get that approved.  I spent 30 minutes with the patient outside of the exam and counseling.  Counseling was majority of this " visit.     Perry Fishman MD  [facility] Department of Surgery

## 2021-05-29 NOTE — ANESTHESIA POSTPROCEDURE EVALUATION
Patient: Nydia Colindres  LAPAROSCOPIC REVISION OF VERTICAL BANDED GASTROPLASTY TO JOSH EN Y GASTRIC BYPASS  Anesthesia type: general    Patient location: PACU  Last vitals:   Vitals Value Taken Time   /70 6/5/2019  1:15 PM   Temp 36.4  C (97.5  F) 6/5/2019 12:27 PM   Pulse 62 6/5/2019  1:18 PM   Resp 9 6/5/2019  1:18 PM   SpO2 96 % 6/5/2019  1:18 PM   Vitals shown include unvalidated device data.  Post vital signs: stable  Level of consciousness: awake and responds to simple questions  Post-anesthesia pain: pain controlled  Post-anesthesia nausea and vomiting: no  Pulmonary: unassisted, return to baseline  Cardiovascular: stable and blood pressure at baseline  Hydration: adequate  Anesthetic events: no    QCDR Measures:  ASA# 11 - Lynne-op Cardiac Arrest: ASA11B - Patient did NOT experience unanticipated cardiac arrest  ASA# 12 - Lynne-op Mortality Rate: ASA12B - Patient did NOT die  ASA# 13 - PACU Re-Intubation Rate: ASA13B - Patient did NOT require a new airway mgmt  ASA# 10 - Composite Anes Safety: ASA10A - No serious adverse event    Additional Notes:

## 2021-05-29 NOTE — TELEPHONE ENCOUNTER
Called patient to check in with her because they got a flat tire on their way in for her appointment with Dr. Fishman and had to turn back home.  She states she feels really nauseated which seems to be the part that is bothering her the most.  She also had two bouts of diarrhea yesterday.  Her pain is tolerable for the most part but she also hasn't been taking her gabapentin.  She was able to get in her metformin and had forgot to take the omeprazole again today because she didn't think it was necessary not having any reflux symptoms.  We discussed how important it was to continue with this for the next 3 months post-op.  Discussed with Dr. Fishman and he would like to get her 2 Liters of LR with 100mg Thiamine in one of the Liters IV via Home Health.  He would also like her to have some Zofran to help with the nausea which we sent to the pharmacy.  Patient verbalized understanding of the plan.  Kasia Goldberg RN

## 2021-05-29 NOTE — TELEPHONE ENCOUNTER
Post-Op Phone Call  [facility]ariat[facility]    Surgeon: Perry Fishman M.D.  Date of Surgery: 6/5/2019  Discharge Date: 6/7/2019    Date/Time Called:   Date: 6/10/2019 Time: 2:55 PM   Attempt: First    Spoke to the patient's  and he said that she is currently sleeping and didn't wake her because she had a tough morning.  He said that she has been drinking as much as she can but it has been slow going.  She has also had some diarrhea and got up to go to the bathroom this morning and then passed out as she was getting ready to come in to the post-op class this morning so she ended up missing that.  I suggested that she come in early to meet with Dr. Fishman tomorrow in clinic instead of waiting until next week and he seemed hesitant due to their long distant drive in but he said he would talk to her when she woke up from her nap and then get back to us.  I told him that we closed at 4 today and to try to call back before that so that we can see what else is going on if anything.  Stalin verbalized understanding of this plan.     Patient called back and said that she was doing well until she threw up twice on Saturday after eating her soup too fast and hasn't felt good since then. She has had the diarrhea a couple times and then passed out in [facility] once and again this morning.  She said that she slept for 20 hours straight yesterday.  She has also struggled to get her metformin in until today for the first time.  Her blood sugars have been in the 180's.  She is pushing to try to get one bottle of water in a day but that is sometimes difficult.  We discussed taking her sips as best as she can, taking her omeprazole daily, and then having her come in to see Dr. Fishman tomorrow in clinic to be further evaluated. She was encouraged to go slow when she is changing positions and to be sure she has her bearings before any movements.  She agreed on this plan.  Kasia Goldberg RN

## 2021-05-29 NOTE — TELEPHONE ENCOUNTER
Called the patient to check in with her after her fluids yesterday and she states that she is feeling better overall.  She isn't dizzy anymore, urinating frequently and the nausea has subsided now to just here and there.  She still has some of the zofran that she can use if need be but she has been able to get fluids and protein shake.  She has been able to get her medications in, including her metformin which she is also happy about.  Blood sugars have been running 110-125.  She did state that she ate a little bit of an egg and chewed it really well because she was super hungry yesterday after getting her fluids.  Cautioned her to stick to the full liquids as we don't want her irritating the pouch or causing any other issues.  She also stated that her incisions were oozing, especially where the drain was located in the hospital, but they have seemed to slow to the point of almost stopping as well.  This was never mentioned prior to now to me.  Encouraged patient to continue with her sips and protein and reminded her of the signs and symptoms to give us a call about in the future.  Patient verbalized understanding of the plan.  Kasia Goldberg RN

## 2021-05-29 NOTE — ANESTHESIA CARE TRANSFER NOTE
Last vitals:   Vitals:    06/05/19 1227   BP: 110/64   Pulse: 66   Resp: 18   Temp: 36.4  C (97.5  F)   SpO2: 97%     Patient's level of consciousness is drowsy  Spontaneous respirations: yes  Maintains airway independently: yes  Dentition unchanged: yes  Oropharynx: oropharynx clear of all foreign objects    QCDR Measures:  ASA# 20 - Surgical Safety Checklist: WHO surgical safety checklist completed prior to induction    PQRS# 430 - Adult PONV Prevention: 4558F - Pt received => 2 anti-emetic agents (different classes) preop & intraop  ASA# 8 - Peds PONV Prevention: NA - Not pediatric patient, not GA or 2 or more risk factors NOT present  PQRS# 424 - Lynne-op Temp Management: 4559F - At least one body temp DOCUMENTED => 35.5C or 95.9F within required timeframe  PQRS# 426 - PACU Transfer Protocol: - Transfer of care checklist used  ASA# 14 - Acute Post-op Pain: ASA14B - Patient did NOT experience pain >= 7 out of 10

## 2021-05-29 NOTE — TELEPHONE ENCOUNTER
"Pt called in because she got a call from the infusion center at [facility] asking about orders for infusion.  I let her know that the infusion that was ordered last week was just a one time thing.  She says that she still has some nausea but is getting food and fluids in.  She is urinating \"a lot\" and having 1 loose stool per day.  She also says that she is taking her vitamins and having pureed foods such as cottage cheese.  She feels that she could do better if she had more Zofran so I asked  about refilling that and he said that would be fine. She will work on increasing her fluids with the zofran on board and I will call her on Monday to re-evaluate.  She will be coming into clinic on Tuesday to see .  Pt verbalized understanding.    Nicki Núñez RN, N  [facility] Surgery and Bariatric Care  P [phone]  F [phone]    "

## 2021-05-29 NOTE — ANESTHESIA PROCEDURE NOTES
Spinal Block    Patient location during procedure: pre-op  Start time: 6/5/2019 9:07 AM  End time: 6/5/2019 9:14 AM  Reason for block: at surgeon's request, post-op pain management and primary anesthetic    Staffing:  Performing  Anesthesiologist: Chantale Chauhan MD    Preanesthetic Checklist  Completed: patient identified, risks, benefits, and alternatives discussed, timeout performed, consent obtained, airway assessed, oxygen available, suction available, emergency drugs available and hand hygiene performed  Spinal Block  Patient position: sitting  Prep: ChloraPrep and site prepped and draped  Patient monitoring: heart rate, continuous pulse ox, blood pressure and cardiac monitor  Approach: midline  Location: L3-4  Injection technique: single-shot  Needle type: Quincke   Needle gauge: 25 G

## 2021-05-29 NOTE — TELEPHONE ENCOUNTER
Called Nydia to see what her sugars have been running and if she is taking her liraglutide and metformin as she showed 500mg/dl glucose on her pre op UA. Her A1c was 7.5. I LM for her to call us back so we can get this information. If sugars are >200 then would be sure to take the liraglutide and if already taking this then take metformin until 48 hours before surgery and follow liquid diet which should not make her sugars go up that high.

## 2021-05-29 NOTE — ANESTHESIA PREPROCEDURE EVALUATION
Anesthesia Evaluation      Patient summary reviewed   No history of anesthetic complications     Airway   Mallampati: II   Pulmonary - normal exam   (+) asthma  moderate, sleep apnea on CPAP, ,   (-) rales    ROS comment: Allergies (pollen/pets)                         Cardiovascular - negative ROS and normal exam  Exercise tolerance: > or = 4 METS  (+) hypertension well controlled, ,     ECG reviewed (NSR possible IMI?)  Rhythm: regular  Rate: normal,         Neuro/Psych    (+) neuromuscular disease,  anxiety/panic attacks,     Endo/Other    (+) diabetes mellitus (A1c=7.5) type 2 well controlled, hypothyroidism, arthritis, obesity,      GI/Hepatic/Renal    (+) GERD well controlled,             Dental - normal exam                        Anesthesia Plan  Planned anesthetic: general endotracheal  GAETT  glidescope on standby  precedex  Pre op intrathecal duramorph  Soft bite block  Ketamine after induction  ASA 3   Induction: intravenous   Anesthetic plan and risks discussed with: patient    Post-op plan: routine recovery

## 2021-05-29 NOTE — PROGRESS NOTES
Time in: 10:00a   /Time out: 11:00a     BMI: There is no height or weight on file to calculate BMI.   Pt presents for 1 week post op dietitian follow up. Reports tolerating full liquids well. Denies n/v, constipation/diarrhea, or significant pain. Taking MVI and SL B12 appropriately. Taking 20-30 oz fluid/day. Educated pt on pureed and soft to bariatric regular diets. Provided grocery list and sample meal plan for each diet stage.  Pt will begin pureed diet on 6/12/2019 and advance as tolerated to softs/bariatric regular on 6/26/2019. Instructed pt to begin 500 mg calcium citrate BID and 5000IU Vitamin D3 on 6/26/2019. Pt to follow up with RD at 3 months post op.

## 2021-05-29 NOTE — TELEPHONE ENCOUNTER
Patient unable to have [facility] come to her because her insurance won't cover it.  We will try to get her in to an infusion center.  Kasia Goldberg RN

## 2021-05-30 NOTE — PROGRESS NOTES
Time in: 4:00 pm Time out: 5:00 pm  .     Pt presents for 1 month post op dietitian follow up class . Reports tolerating soft food diet well. Denies n/v, constipation/diarrhea, or significant pain. Taking MVI and SL B12 appropriately.Instructed pt to begin taking 500 mg calcium citrate BID and 5000 IU Vitamin D3. Educated pt on soft to bariatric regular diets, medications, developing an exercise regimen, and other dietary points of care. Provided  Education handout on items discussed . Pt to follow up with RD at 3 months post op.

## 2021-05-30 NOTE — TELEPHONE ENCOUNTER
Nydia Caal was concerned about her blood sugar of 170. She is taking her metformin and is wondering if she should start her victoza. I encouraged her to watch her sugars as they are at an OK level for now for healing. She may be having a stress response. I encouraged her to do her best to rest, hydrate and get her 60gm protein. She will let us know if she has questions or concerns and anticipate ups and downs for the first 3 months after surgery with more ups than downs as time goes by.

## 2021-05-30 NOTE — PROGRESS NOTES
"HPI: Pt is here for follow up of a conversion of a VBG to a Lexi-en-Y gastric bypass. She is doing okay but not great.  She is having difficulty with dysphasia.  She states that when she eats she feels like there is a bubble in her stomach.  She has been able to get down some liquids as well as food such as hard-boiled eggs that smashed up and puréed pork.  She is little bit concerned about her fluid intake if she is only getting about 20 to 30 ounces of water in per day.  She states that the Zofran helps with some of the chronic nausea that she is been experiencing.  She was also a little bit concerned today that her blood sugars were elevated this morning up in the 170 range whereas they had been running more in the 1 10-1 20 range.  She denies any fevers or chills.  She does state that she tires easily.    Current Outpatient Medications   Medication Sig Dispense Refill     cyanocobalamin, vitamin B-12, 1,000 mcg Subl Place 1,000 mcg under the tongue daily.       gabapentin (NEURONTIN) 300 MG capsule Take 300-600 mg by mouth see administration instructions. 300 mg every morning, 600 mg every evening             levothyroxine (SYNTHROID, LEVOTHROID) 175 MCG tablet Take 175 mcg by mouth Daily at 6:00 am.       lisinopril (PRINIVIL,ZESTRIL) 10 MG tablet Take 10 mg by mouth daily.       metFORMIN (GLUCOPHAGE) 500 MG tablet Take 2 tablets (1,000 mg total) by mouth 2 (two) times a day with meals. 120 tablet 0     ondansetron (ZOFRAN) 4 MG tablet Take 1 tablet (4 mg total) by mouth every 6 (six) hours as needed for nausea. 12 tablet 0     pediatric multivitamin-iron (POLY-VI-SOL WITH IRON) chewable tablet Chew 1 tablet 2 (two) times a day.       tocilizumab (ACTEMRA) 162 mg/0.9 mL Syrg subcutaneous injection Inject 162 mg under the skin once a week. On Sundays             No current facility-administered medications for this visit.          /71   Pulse 94   Resp 18   Ht 5' 2\" (1.575 m)   Wt 182 lb (82.6 kg)   " SpO2 99%   BMI 33.29 kg/m    Wt Readings from Last 3 Encounters:   06/25/19 182 lb (82.6 kg)   06/05/19 193 lb (87.5 kg)   05/21/19 200 lb 9.6 oz (91 kg)     Body mass index is 33.29 kg/m .    EXAM:  GENERAL:Appears well  LUNGS: Clear  COR: Regular rhythm and rate  ABDOMEN: Incisions healing well.  Soft, nontender.      Assessment/Plan: Pt s/p revision of VBG to Lexi-en-Y gastric bypass.  She is moving along somewhat slowly with some low-grade chronic nausea and some dysphasia.  However there is foods that she is getting down and go down fairly easily such as the puréed pork and mashed up hard-boiled eggs.  I think that her diet should progress and I discussed this with her and her .  I also discussed with her that sometimes patients can experience some low-grade nausea for the first couple of months after surgery that typically resolves on its own.  She felt better after a conversation and will plan on trying to increase her fluid intake, use the Zofran as needed.  In regards to her elevated blood sugars and when I have Dr. Alise Irby look those over and contact the patient. She will f[facility] with [facility] in 3 months.    Perry Fishman MD  [facility] Department of Surgery

## 2021-05-30 NOTE — PATIENT INSTRUCTIONS - HE
Still having some post op nausea, though getting some foods down. Exam looks good today. Discussed activity and diet moving forward. Expect nausea to gradually resolve

## 2021-05-31 NOTE — TELEPHONE ENCOUNTER
"Spoke to the patient while she was attending pre-op education class with her  who will be having surgery soon.  She has some concerns about her eating that continues to be a struggle similar to what she was experiencing before her revision.  She states that she continues to vomit almost daily and has some underlying nausea as well.  She has a hard time with any of the protein drinks because they are \"too sweet\" for her now and make her nauseated.  She also is having trouble getting her Vitamin B12 SL because of the sweetness and she states it gives her dumping symptoms shortly afterward.  She is due for her 3 month post-op visit soon with the RD next month but I also told her that it isn't expected for her to continue to vomit.  She states she is eating the recommended portion sizes and still taking her omeprazole.  Will discuss with Dr. Fishman and get his thoughts on it.  Kasia Goldberg RN  "

## 2021-05-31 NOTE — ANESTHESIA CARE TRANSFER NOTE
Last vitals:   Vitals:    08/27/19 1028   BP: 117/68   Pulse: 78   Resp: 16   Temp: 36.4  C (97.6  F)   SpO2: 97%     Patient's level of consciousness is awake  Spontaneous respirations: yes  Maintains airway independently: yes  Dentition unchanged: yes  Oropharynx: oropharynx clear of all foreign objects    QCDR Measures:  ASA# 20 - Surgical Safety Checklist: WHO surgical safety checklist completed prior to induction    PQRS# 430 - Adult PONV Prevention: NA - Not adult patient, not GA or 3 or more risk factors NOT present  ASA# 8 - Peds PONV Prevention: NA - Not pediatric patient, not GA or 2 or more risk factors NOT present  PQRS# 424 - Lynne-op Temp Management: NA - MAC anesthesia or case < 60 minutes  PQRS# 426 - PACU Transfer Protocol: - Transfer of care checklist used  ASA# 14 - Acute Post-op Pain: ASA14B - Patient did NOT experience pain >= 7 out of 10

## 2021-05-31 NOTE — ANESTHESIA POSTPROCEDURE EVALUATION
Patient: Nydia Colindres  UPPER ENDOSCOPY  Anesthesia type: MAC    Patient location: Phase II Recovery  Last vitals:   Vitals Value Taken Time   /65 8/27/2019 10:53 AM   Temp 36.4  C (97.6  F) 8/27/2019 10:28 AM   Pulse 65 8/27/2019 10:54 AM   Resp 16 8/27/2019 10:45 AM   SpO2 99 % 8/27/2019 10:54 AM   Vitals shown include unvalidated device data.  Post vital signs: stable  Level of consciousness: awake and responds to simple questions  Post-anesthesia pain: pain controlled  Post-anesthesia nausea and vomiting: no  Pulmonary: unassisted, return to baseline  Cardiovascular: stable and blood pressure at baseline  Hydration: adequate  Anesthetic events: no    QCDR Measures:  ASA# 11 - Lynne-op Cardiac Arrest: ASA11B - Patient did NOT experience unanticipated cardiac arrest  ASA# 12 - Lynne-op Mortality Rate: ASA12B - Patient did NOT die  ASA# 13 - PACU Re-Intubation Rate: NA - No ETT / LMA used for case  ASA# 10 - Composite Anes Safety: ASA10A - No serious adverse event    Additional Notes:

## 2021-05-31 NOTE — TELEPHONE ENCOUNTER
Discussed with Dr. Fishman and he would prefer that the patient has an EGD to see if there is an restriction or ulcer forming in the pouch.  Kasia Goldberg RN

## 2021-05-31 NOTE — ANESTHESIA PREPROCEDURE EVALUATION
Anesthesia Evaluation      Patient summary reviewed   No history of anesthetic complications     Airway   Mallampati: II  Neck ROM: full   Pulmonary - normal exam   (+) asthma  mild,  well controlled, sleep apnea on CPAP, ,                          Cardiovascular - normal exam  Exercise tolerance: > or = 4 METS  (+) hypertension, ,      Neuro/Psych    (+) neuromuscular disease,  depression, chronic pain    Endo/Other    (+) diabetes mellitus type 2 using insulin, hypothyroidism, arthritis, obesity,      GI/Hepatic/Renal    (+) GERD well controlled,   impaired hepatic function          Dental - normal exam                        Anesthesia Plan  Planned anesthetic: MAC    ASA 3     Anesthetic plan and risks discussed with: patient and spouse    Post-op plan: routine recovery

## 2021-05-31 NOTE — TELEPHONE ENCOUNTER
Spoke to Nydia Caal today. She is scheduled for an EGD with DR. KENDELL Mann on 8/27/19 at the [facility]. Nydia Caal understands that the prep is NPO after midnight, and need a . She is also aware that this procedure will be at the surgery center in  [place].     Carolina Hyde, [facility]  Surgery Scheduling  [phone]

## 2021-06-01 VITALS — BODY MASS INDEX: 36.07 KG/M2 | WEIGHT: 196 LBS | HEIGHT: 62 IN

## 2021-06-01 VITALS — HEIGHT: 62 IN | WEIGHT: 193 LBS | BODY MASS INDEX: 35.51 KG/M2

## 2021-06-01 VITALS — HEIGHT: 62 IN | WEIGHT: 197.5 LBS | BODY MASS INDEX: 36.34 KG/M2

## 2021-06-01 NOTE — PROGRESS NOTES
"Post-op Surgical Weight Loss Diet Evaluation     Assessment:  Pt presents for 3 months post-op RD visit, s/p Revision of lap band to RYGB on 6-7-19 with Dr. Fishman. Today we reviewed current eating habits and level of physical activity, and instructed on the changes that are required for successful bariatric outcomes.    Patient Progress: dizzy, trouble with protein and water, patient states her sense of smell and taste are more prevalent, can't tolerate protein shakes    Pt's Initial Weight: 286 lbs  Weight: 164 lb (74.4 kg)  Weight loss from initial: 122  % Weight loss: 42.66 %      Body mass index is 30 kg/m .     Concerns: inadequate protein intake and water intake     Diabetes  Testing Blood Sugars: yes  If so, how often? Morning- this morning was 140    Vitamins   Multi Vit with Iron: yes  Calcium Citrate: yes  B12: yes  D3: yes    Do you experience hunger? no  Do you have \"dumping\" syndrome? no   Do you experience any reflux or discomfort with eating? yes  -Are you still taking omeprazole? yes  Nausea: no  Vomiting: yes- trying not to eat foods that cause this  Diarrhea: no  Constipation: yes- stool softener  Hair loss: unknown- lost hair back in 2006    Diet Recall/Time:   Breakfast: 1/2 egg and 1/2 english muffin (couple bites  Am Snack: none  Lunch: tomato soup  Pm snack:none  Dinner: spaghetti sauce with a small grilled cheese sandwich  HS Snack: none    Typical Snacks: none    Proteins/Veg/Fruits/CHO (NOT well tolerated): spaghetti      Estimated portion size per meals:1/4 cup/meal    Incorporation of vegetables, fruits, carbohydrates into diet/meals using   Bariatric \"Plate Method\"   The patient and I discussed the importance of including lean/low fat protein at each meal, including a vegetable/fruit, and limiting carbohydrate intake to less than 25% of plate volume. Always keeping within approved perimeters of post op meal portion sizes according to 3 months post op guidelines.    Meal Duration:30 " "minutes  Encouraged slowing meal times down, 20-30 minutes, chewing to applesauce consistency.     Fluid-meal separation:  Fluids are  30min before and 30 minutes after meals.  The patient and I reviewed the anatomy of the bypass and why  fluids from a meal is so important.    Fluid Intake  Water: working on getting 64oz    Discussed the importance of adequate hydration after surgery and the goal of 64+ oz of fluid daily.   The patient understands the importance of avoiding all carbonated, caffeinated, and sweetened drinks; and instead choosing 64oz plain water.    Exercise  Pt's understands that 30-60 minutes of daily activity is an important part of bariatric surgery success.   Encouraged pt to incorporate strength training exercise along with cardiovascular exercise as well, most days of the week.      PES statement:    1. (NI-5.7.1) Inadequate protein intake related to Gastric bypass causing increased nutrient needs due to malabsorption/ Decreased ability to consume sufficient protein as evidenced by Edema, poor musculature, dull skin, thin and fragile hair; and Estimated intake of protein insufficient to meet requirements     2. (NC-1.4) Altered GI Function related to Alteration in gastrointestinal tract structure and/or function/ Decreased functional length of the GI tract as evidenced by Weight loss of 42% initial body weight; Gastric bypass surgery     Intervention    Discussion  1. Discussed 3 months  Post-Op Nutritional Guidelines for RYGB  2. Recommended to consume 15-20gm protein at 3 meals daily, along with protein supplement/\"planned protein containing snack\" of 15-30gm protein, to reach goal of 60-80 gm protein daily.  3. Educated on post-op vitamin regimen: Multi Vit + iron 2x/day, calcium citrate 400-600 mg 2x/day, 0355-5849 mcg of Sublingual B-12 daily, and 5000 IU Vitamin D3 daily (MVI and calcium can be taken at the same time BID)  4. Reviewed lean protein sources  5. Bariatric " "Plate Method-  including lean/low fat protein at each meal, including a vegetable/fruit, and limiting carbohydrate intake to less than 25% of plate volume. Approved perimeters of post op meal portion sizes according to 3 months post op guidelines.  Instructions  1. Include 15-20gm protein at each meal, along with protein supplement/\"planned protein containing snack\" of 15-30gm protein, to reach goal of 60-80 gm protein daily.  2. Increase fluid intake to 64oz daily: choose plain or calorie/carbonation-free beverages.  3. Incorporate daily structured activity, 30-60 minutes most days of the week  4. Recommended pt to start taking: Multi Vit + iron 2x/day, calcium citrate 400-600 mg 2x/day, 8635-7621 mcg of Sublingual B-12 daily, and 5000 IU Vitamin D3 daily. (MVI and calcium can be taken at the same time)  5. Read food labels more consistently: keeping total fat grams <10, total sugar grams <10, fiber >3gm per serving.  6. Increase vegetable/fruit intake, by having a vegetable or fruit with each meal daily.  7. Practice plate method: 1/2 plate lean/low fat protein source, vegetable/fruit, <25% of plate complex carbohydrates.  8. Separate fluids 30 minutes before/after meal times.  9. Practice eating off of smaller plates/bowls, chewing to applesauce consistency, taking 20-30 minutes to eat in a calm/relaxed environment without distractions of tv/email/cell phone.    Handouts provided:  3 months  Post-Op Nutritional Guidelines for RYGB    Monitor/Evaluation    Pt to follow up for 6 months  post-op visit with bariatrician       Time In: 10:05a  Time Out: 10:45a      ABN signed: Yes    "

## 2021-06-02 ENCOUNTER — AMBULATORY - HEALTHEAST (OUTPATIENT)
Dept: LAB | Facility: HOSPITAL | Age: 66
End: 2021-06-02

## 2021-06-02 VITALS — HEIGHT: 62 IN | WEIGHT: 194 LBS | BODY MASS INDEX: 35.7 KG/M2

## 2021-06-02 VITALS — BODY MASS INDEX: 34.96 KG/M2 | HEIGHT: 62 IN | WEIGHT: 190 LBS

## 2021-06-02 DIAGNOSIS — K91.2 POSTSURGICAL MALABSORPTION: ICD-10-CM

## 2021-06-02 DIAGNOSIS — K90.9 INTESTINAL MALABSORPTION, UNSPECIFIED: ICD-10-CM

## 2021-06-02 DIAGNOSIS — Z98.84 S/P GASTRIC BYPASS: ICD-10-CM

## 2021-06-02 DIAGNOSIS — E11.9 TYPE 2 DIABETES MELLITUS WITHOUT COMPLICATION, WITHOUT LONG-TERM CURRENT USE OF INSULIN (H): ICD-10-CM

## 2021-06-02 LAB
25(OH)D3 SERPL-MCNC: 30 NG/ML (ref 30–80)
ALBUMIN SERPL-MCNC: 3.8 G/DL (ref 3.5–5)
ALP SERPL-CCNC: 100 U/L (ref 45–120)
ALT SERPL W P-5'-P-CCNC: 14 U/L (ref 0–45)
ANION GAP SERPL CALCULATED.3IONS-SCNC: 5 MMOL/L (ref 5–18)
AST SERPL W P-5'-P-CCNC: 12 U/L (ref 0–40)
BILIRUB SERPL-MCNC: 0.6 MG/DL (ref 0–1)
BUN SERPL-MCNC: 11 MG/DL (ref 8–22)
CALCIUM SERPL-MCNC: 9.4 MG/DL (ref 8.5–10.5)
CHLORIDE BLD-SCNC: 104 MMOL/L (ref 98–107)
CO2 SERPL-SCNC: 28 MMOL/L (ref 22–31)
CREAT SERPL-MCNC: 0.75 MG/DL (ref 0.6–1.1)
ERYTHROCYTE [DISTWIDTH] IN BLOOD BY AUTOMATED COUNT: 12 % (ref 11–14.5)
FERRITIN SERPL-MCNC: 86 NG/ML (ref 10–130)
FOLATE SERPL-MCNC: 16.1 NG/ML
GFR SERPL CREATININE-BSD FRML MDRD: >60 ML/MIN/1.73M2
GLUCOSE BLD-MCNC: 190 MG/DL (ref 70–125)
HBA1C MFR BLD: 9.1 %
HCT VFR BLD AUTO: 41 % (ref 35–47)
HGB BLD-MCNC: 13.5 G/DL (ref 12–16)
MCH RBC QN AUTO: 29.2 PG (ref 27–34)
MCHC RBC AUTO-ENTMCNC: 32.9 G/DL (ref 32–36)
MCV RBC AUTO: 89 FL (ref 80–100)
PLATELET # BLD AUTO: 229 THOU/UL (ref 140–440)
PMV BLD AUTO: 9.8 FL (ref 8.5–12.5)
POTASSIUM BLD-SCNC: 4 MMOL/L (ref 3.5–5)
PROT SERPL-MCNC: 6.9 G/DL (ref 6–8)
PTH-INTACT SERPL-MCNC: 63 PG/ML (ref 10–86)
RBC # BLD AUTO: 4.63 MILL/UL (ref 3.8–5.4)
SODIUM SERPL-SCNC: 137 MMOL/L (ref 136–145)
VIT B12 SERPL-MCNC: 1413 PG/ML (ref 213–816)
WBC: 4.7 THOU/UL (ref 4–11)

## 2021-06-03 VITALS — BODY MASS INDEX: 30.36 KG/M2 | HEIGHT: 62 IN | WEIGHT: 165 LBS

## 2021-06-03 VITALS — BODY MASS INDEX: 30.18 KG/M2 | HEIGHT: 62 IN | WEIGHT: 164 LBS

## 2021-06-03 VITALS — BODY MASS INDEX: 36.69 KG/M2 | WEIGHT: 200.6 LBS

## 2021-06-03 VITALS — HEIGHT: 62 IN | WEIGHT: 193 LBS | BODY MASS INDEX: 35.51 KG/M2

## 2021-06-03 VITALS — BODY MASS INDEX: 33.49 KG/M2 | HEIGHT: 62 IN | WEIGHT: 182 LBS

## 2021-06-03 VITALS — HEIGHT: 62 IN | BODY MASS INDEX: 36.44 KG/M2 | WEIGHT: 198 LBS

## 2021-06-04 ENCOUNTER — COMMUNICATION - HEALTHEAST (OUTPATIENT)
Dept: SURGERY | Facility: CLINIC | Age: 66
End: 2021-06-04
Payer: COMMERCIAL

## 2021-06-04 ENCOUNTER — AMBULATORY - HEALTHEAST (OUTPATIENT)
Dept: SURGERY | Facility: CLINIC | Age: 66
End: 2021-06-04

## 2021-06-04 ENCOUNTER — TRANSFERRED RECORDS (OUTPATIENT)
Dept: HEALTH INFORMATION MANAGEMENT | Facility: CLINIC | Age: 66
End: 2021-06-04

## 2021-06-04 ENCOUNTER — SURGERY - HEALTHEAST (OUTPATIENT)
Dept: SURGERY | Facility: CLINIC | Age: 66
End: 2021-06-04

## 2021-06-04 ENCOUNTER — OFFICE VISIT - HEALTHEAST (OUTPATIENT)
Dept: SURGERY | Facility: CLINIC | Age: 66
End: 2021-06-04

## 2021-06-04 DIAGNOSIS — Z11.59 ENCOUNTER FOR SCREENING FOR OTHER VIRAL DISEASES: ICD-10-CM

## 2021-06-04 DIAGNOSIS — E66.01 MORBID OBESITY (H): ICD-10-CM

## 2021-06-04 DIAGNOSIS — R11.10 VOMITING, INTRACTABILITY OF VOMITING NOT SPECIFIED, PRESENCE OF NAUSEA NOT SPECIFIED, UNSPECIFIED VOMITING TYPE: ICD-10-CM

## 2021-06-04 DIAGNOSIS — R11.10 VOMITING: ICD-10-CM

## 2021-06-04 DIAGNOSIS — K91.2 POSTOPERATIVE MALABSORPTION: ICD-10-CM

## 2021-06-04 DIAGNOSIS — R63.4 WEIGHT LOSS, ABNORMAL: ICD-10-CM

## 2021-06-04 DIAGNOSIS — M06.9 RHEUMATOID ARTHRITIS, INVOLVING UNSPECIFIED SITE, UNSPECIFIED WHETHER RHEUMATOID FACTOR PRESENT (H): ICD-10-CM

## 2021-06-04 DIAGNOSIS — Z98.84 HISTORY OF BARIATRIC SURGERY: ICD-10-CM

## 2021-06-04 DIAGNOSIS — R21 RASH: ICD-10-CM

## 2021-06-04 DIAGNOSIS — E11.65 TYPE 2 DIABETES MELLITUS WITH HYPERGLYCEMIA, WITHOUT LONG-TERM CURRENT USE OF INSULIN (H): ICD-10-CM

## 2021-06-04 LAB
ANNOTATION COMMENT IMP: NORMAL
VIT A SERPL-MCNC: 0.64 MG/L (ref 0.3–1.2)
VITAMIN A (RETINYL PALMITATE): 0.04 MG/L (ref 0–0.1)
ZINC SERPL-MCNC: 66.5 UG/DL (ref 60–120)

## 2021-06-04 ASSESSMENT — MIFFLIN-ST. JEOR: SCORE: 1214.01

## 2021-06-05 ENCOUNTER — AMBULATORY - HEALTHEAST (OUTPATIENT)
Dept: FAMILY MEDICINE | Facility: CLINIC | Age: 66
End: 2021-06-05

## 2021-06-05 DIAGNOSIS — Z11.59 ENCOUNTER FOR SCREENING FOR OTHER VIRAL DISEASES: ICD-10-CM

## 2021-06-05 LAB
SARS-COV-2 PCR COMMENT: NORMAL
SARS-COV-2 RNA SPEC QL NAA+PROBE: NEGATIVE
SARS-COV-2 VIRUS SPECIMEN SOURCE: NORMAL

## 2021-06-06 ENCOUNTER — COMMUNICATION - HEALTHEAST (OUTPATIENT)
Dept: SCHEDULING | Facility: CLINIC | Age: 66
End: 2021-06-06

## 2021-06-06 LAB — VIT B1 PYROPHOSHATE BLD-SCNC: 110 NMOL/L (ref 70–180)

## 2021-06-07 NOTE — PROGRESS NOTES
"Nydia Colindres is a 64 y.o. female who is being evaluated via a billable telephone visit.      The patient has been notified of following:     \"This telephone visit will be conducted via a call between you and your physician/provider. We have found that certain health care needs can be provided without the need for a physical exam.  This service lets us provide the care you need with a short phone conversation.  If a prescription is necessary we can send it directly to your pharmacy.  If lab work is needed we can place an order for that and you can then stop by our lab to have the test done at a later time.    If during the course of the call the physician/provider feels a telephone visit is not appropriate, you will not be charged for this service.\"     Nydia Colindres complains of    Chief Complaint   Patient presents with     Abdominal Pain     VBG to RYGB Dr. Fishman 6/5/2019       I have reviewed and updated the patient's Past Medical History, Social History, Family History and Medication List.    ALLERGIES  Metrizamide; Nsaids (non-steroidal anti-inflammatory drug); Sulfa (sulfonamide antibiotics); and Diatrizoate meglumine    Bariatric Follow Up Visit with a History of Previous Bariatric Surgery     Date of visit: 3/25/2020  Physician: Alise Irby MD  Primary Care Provider:  Charanjit Carty MD  Nydia Colindres   64 y.o.  female    Date of Surgery: 6/5/2019  Initial Weight: 196# was 315# prior to her VBG  Initial BMI: 35.85 prior to RYGB revision  Today's Weight:   Wt Readings from Last 1 Encounters:   03/25/20 165 lb (74.8 kg)     Body mass index is 30.18 kg/m .      Assessment and Plan     Assessment: Nydia is a 64 y.o. year old female who is 9 monhts s/p  Lexi en Y Gastric Bypass with Dr. iFshman. This was a revision from a VBG in 2002  Nydia Colindres feels as if she has achieved the goals she hoped to accomplish through bariatric surgery and weight loss.    Encounter Diagnosis   Name Primary?     " Postoperative malabsorption Yes         Current Outpatient Medications:      cyanocobalamin, vitamin B-12, 1,000 mcg Subl, Place 1,000 mcg under the tongue daily., Disp: , Rfl:      gabapentin (NEURONTIN) 300 MG capsule, Take 300-600 mg by mouth see administration instructions. 300 mg every morning, 600 mg every evening   , Disp: , Rfl:      levothyroxine (SYNTHROID, LEVOTHROID) 175 MCG tablet, Take 175 mcg by mouth Daily at 6:00 am., Disp: , Rfl:      lisinopril (PRINIVIL,ZESTRIL) 10 MG tablet, Take 10 mg by mouth daily., Disp: , Rfl:      pediatric multivitamin-iron (POLY-VI-SOL WITH IRON) chewable tablet, Chew 1 tablet 2 (two) times a day., Disp: , Rfl:      tofacitinib (XELJANZ) 5 mg tablet, Take 2 tablets by mouth daily., Disp: , Rfl:      gabapentin (NEURONTIN) 250 mg/5 mL solution, Take 6 mL (300 mg total) by mouth at bedtime., Disp: 250 mL, Rfl: 3     metFORMIN (GLUCOPHAGE) 500 MG tablet, Take 2 tablets (1,000 mg total) by mouth 2 (two) times a day with meals., Disp: 120 tablet, Rfl: 0     ondansetron (ZOFRAN) 4 MG tablet, Take 1 tablet (4 mg total) by mouth every 6 (six) hours as needed for nausea., Disp: 12 tablet, Rfl: 0     tocilizumab (ACTEMRA) 162 mg/0.9 mL Syrg subcutaneous injection, Inject 162 mg under the skin once a week. On Sundays   , Disp: , Rfl:     Plan: continue omeprazole. Continue vitamins with consistency. Avoid GI insults    Return in about 2 months (around 5/25/2020), or dietitian and annual bariatrician.    Bariatric Surgery Review     Interim History/LifeChanges: Abdominal pain    Patient Concerns: epigastric abdominal pain  Appetite (1-10): OK  GERD: on PPI now    Medication changes: omeprazole    Vitamin Intake:   B-12   SL   MVI  2/d   Vitamin D  sun   Calcium   citrate 1/d     Other                LABS: ordered  She is in [place]  Nausea no  Vomiting no  Constipation no black stools with Pepto Bismol  Diarrhea no  Rashes no  Hair Loss no  Calf tenderness no  Breathing difficulty  no  Reactive Hypoglycemia no  Light Headedness no   Moods Ok    12 point ROS as above and otherwise negative      Habits:  Alcohol: no  Tobacco: none 1984 from 3ppd  Caffeine coffee (going down)  NSAIDS occ for back aches  Exercise Routine: swimming daily in [place]-when in [place] walks  3 meals/day yes  Protein first yes  60 grams/day  Water Separate from meals yes  Calorie Containing Beverages no  Restaurant eating/wk 3X/wk-less now d/t COVID 19  Sleeping well on new mattress  Stress doing well  CPAP: NA  Contraception: PM  DEXA: at 2 yrs post op.     Social History     Social History     Socioeconomic History     Marital status:      Spouse name: Not on file     Number of children: Not on file     Years of education: Not on file     Highest education level: Not on file   Occupational History     Not on file   Social Needs     Financial resource strain: Not on file     Food insecurity     Worry: Not on file     Inability: Not on file     Transportation needs     Medical: Not on file     Non-medical: Not on file   Tobacco Use     Smoking status: Former Smoker     Packs/day: 3.00     Years: 14.00     Pack years: 42.00     Smokeless tobacco: Never Used     Tobacco comment: age 15 to 29 up to 3ppd   Substance and Sexual Activity     Alcohol use: Not Currently     Drug use: No     Sexual activity: Yes     Partners: Male     Birth control/protection: Post-menopausal   Lifestyle     Physical activity     Days per week: Not on file     Minutes per session: Not on file     Stress: Not on file   Relationships     Social connections     Talks on phone: Not on file     Gets together: Not on file     Attends Evangelical service: Not on file     Active member of club or organization: Not on file     Attends meetings of clubs or organizations: Not on file     Relationship status: Not on file     Intimate partner violence     Fear of current or ex partner: Not on file     Emotionally abused: Not on file     Physically abused: Not on  file     Forced sexual activity: Not on file   Other Topics Concern     Not on file   Social History Narrative     2 daughters 1 granddaughter    Retired. Worked for [employer]. Working at [employer] in [employer] 2-3d/wk       Past Medical History     Past Medical History:   Diagnosis Date     Asthma      Depression      Diabetes mellitus (H) 2002     Diabetic neuropathy (H)      Disease of thyroid gland     hypothyroid     Elevated liver enzymes     ?d/t anti rheumatic injectables     Fibromyalgia, primary      Foot pain      GERD (gastroesophageal reflux disease)      Hypertension      Lumbar back pain      Metabolic syndrome      Morbid obesity (H)      Rheumatoid arthritis (H)     age 39     Sleep apnea     CPAP     Urgency incontinence      Problem List     Patient Active Problem List   Diagnosis     Uncontrolled type 2 diabetes mellitus without complication, without long-term current use of insulin (H)     Dyslipidemia     Fibromyalgia     History of gastric bypass     Hypothyroidism, unspecified type     Insomnia     Olecranon bursitis of left elbow     Poorly controlled type 2 diabetes mellitus (H)     Depression     Fibromyalgia, primary     GERD (gastroesophageal reflux disease)     Diabetes mellitus (H)     Rheumatoid arthritis (H)     Elevated liver enzymes     Sleep apnea     Disease of thyroid gland     Urgency incontinence     Asthma     Hypertension     Metabolic syndrome     Morbid obesity (H)     Lumbar back pain     Foot pain     Diabetic neuropathy (H)     Screening-pulmonary TB     Encounter for long-term (current) use of high-risk medication     S/P gastric bypass     Dehydration     Nausea     Diarrhea     Luetscher's syndrome     Type 2 diabetes mellitus without complication, without long-term current use of insulin (H)     Intractable vomiting with nausea, unspecified vomiting type     Other dysphagia     Medications     Current Outpatient Medications   Medication Sig Note      "cyanocobalamin, vitamin B-12, 1,000 mcg Subl Place 1,000 mcg under the tongue daily.      gabapentin (NEURONTIN) 300 MG capsule Take 300-600 mg by mouth see administration instructions. 300 mg every morning, 600 mg every evening            levothyroxine (SYNTHROID, LEVOTHROID) 175 MCG tablet Take 175 mcg by mouth Daily at 6:00 am. 6/5/2019: Hasn't been taking recently due to insurance issues     lisinopril (PRINIVIL,ZESTRIL) 10 MG tablet Take 10 mg by mouth daily. 6/5/2019: Hasn't been taking recently due to insurance issues     pediatric multivitamin-iron (POLY-VI-SOL WITH IRON) chewable tablet Chew 1 tablet 2 (two) times a day.      tofacitinib (XELJANZ) 5 mg tablet Take 2 tablets by mouth daily.      gabapentin (NEURONTIN) 250 mg/5 mL solution Take 6 mL (300 mg total) by mouth at bedtime.      metFORMIN (GLUCOPHAGE) 500 MG tablet Take 2 tablets (1,000 mg total) by mouth 2 (two) times a day with meals.      ondansetron (ZOFRAN) 4 MG tablet Take 1 tablet (4 mg total) by mouth every 6 (six) hours as needed for nausea.      tocilizumab (ACTEMRA) 162 mg/0.9 mL Syrg subcutaneous injection Inject 162 mg under the skin once a week. On Sundays            Surgical History     Past Surgical History  She has a past surgical history that includes Hiatal hernia repair (2005); Gastroplasty vertical banded (11/22/2004); Cholecystectomy; Tympanostomy tube placement; Myringotomy; Appendectomy; Gastroplasty (N/A, 6/5/2019); and pr esophagogastroduodenoscopy transoral diagnostic (N/A, 8/27/2019).    Objective-Exam     Constitutional:  Ht 5' 2\" (1.575 m)   Wt 165 lb (74.8 kg)   BMI 30.18 kg/m    Height: 5' 2\" (1.575 m) (3/25/2020  9:37 AM)  Initial Weight: 286 lbs (9/6/2019 10:00 AM)  Weight: 165 lb (74.8 kg) (3/25/2020  9:37 AM)  Weight loss from initial: 122 (9/6/2019 10:00 AM)  % Weight loss: 42.66 % (9/6/2019 10:00 AM)  BMI (Calculated): 30.2 (3/25/2020  9:37 AM)  SpO2: 98 % (8/27/2019 10:50 AM)    General:  Pleasant and in no " acute distress       Counseling     We reviewed the important post op bariatric recommendations:  -eating 3 meals daily  -eating protein first, getting >60gm protein daily  -eating slowly, chewing food well  -avoiding/limiting calorie containing beverages  -drinking water 15-30 minutes before or after meals  -choosing wheat, not white with breads, crackers, pastas, juan, bagels, tortillas, rice  -limiting restaurant or cafeteria eating to twice a week or less    We discussed the importance of restorative sleep and stress management in maintaining a healthy weight.  We discussed the National Weight Control Registry healthy weight maintenance strategies and ways to optimize metabolism.  We discussed the importance of physical activity including cardiovascular and strength training in maintaining a healthier weight.    We discussed the importance of life-long vitamin supplementation and life-long  follow-up.    Nydia was reminded that, to avoid marginal ulcers she should avoid tobacco at all, alcohol in excess, caffeine in excess, and NSAIDS (unless indicated for cardioprotection or othewise and opposed by a PPI).    Alise Irby MD, FAAFP  [facility].  3/25/2020  9:36 AM      No images are attached to the encounter.      Assessment/Plan:    1. Postoperative malabsorption        Epigastric pain from ASA, ibuprofen, coffee resolved with omeprazole.  Continue to avoid insults. Continue omeprazole.  Dietitian f/u in May when returns to [place] and Annual labs and f/u in May.    I have reviewed the note as documented above.  This accurately captures the substance of my conversation with the patient.    Phone call contact time    Call Started at: 9:30  Call Ended at: 10:00      Signature:  Alise Irby MD, FAAFP

## 2021-06-07 NOTE — PATIENT INSTRUCTIONS - HE
[facility]  Nutritional Guidelines  Gastric Bypass 9 Months Post Op    General Guidelines and Helpful Hints:    Eat 3 meals per day + protein supplement(s). No snacks between meals.  o Do not skip meals.  This can cause overeating at the next meal and will prevent adequate protein and nutritional intake.    Aim for 60-80 grams of protein per day.  o Always eat your protein first. This assists with optimal nutrition and helps you stay full longer.  o Depending on your portion size, you may need to drink approved protein supplement between meals to achieve protein goals. Follow recommendations of your Dietitian.     Follow appropriate portion size: Use measuring cups to be accurate.    Months Post Op Portion Size per Month   9 months 3/4 cup   10 - 12 months 3/4 - 1 cup   12 months 1 cup maximum       Continue to use saucer/salad plates, infant/toddler silverware to keep portion sizes small and take small bites.    Eat S-L-O-W-L-Y to make each meal last 20-30 minutes. Always stop eating when satisfied.    Continue to use caution with foods containing skins, peels or membranes. Chew well!    Aim for 64 oz. of calorie-free fluids daily.  o Continue to avoid caffeine, carbonation and alcohol.  o Remember to avoid drinking during meals, 15-30 minutes before and 30 minutes after.    Aim for 30-60 minutes of physical activity most days of the week.    If having trouble tolerating meat, try using a crock-pot, tinfoil tent, steamer or other moist cooking method to create tender meats. Add broth or low-fat gravy to help meat stay moist.     Avoid high sugar and high fat foods to prevent dumping syndrome.  o Check nutrition labels for less than 10 grams of sugar and less than 10 grams of fat per serving.    Continue Taking Vitamins/Minerals:  o 7073-9316 mcg of Sublingual B-12 daily  o 1 Multivitamin with Iron twice daily (chewable or swallow tabs)  o 500-600 mg Calcium Citrate twice daily (chewable or swallow  tabs)  o 5000 IU Vitamin D3 daily    Sample Grocery List    Protein:    Fat free Greek or light yogurt (less than 10 grams sugar)    Fat free or low-fat cottage cheese    String cheese or reduced fat cheese slices    Tuna, salmon, crab, egg, or chicken salad made with light or fat free mayonnaise    Egg or Egg Substitute    Lean/extra lean turkey, beef, bison, venison (ground, sirloin, round, flank)    Pork loin or tenderloin (grilled, baked, broiled)    Fish such as salmon, tuna, trout, tilapia, etc. (grilled, baked, broiled)    Tender cuts of lean (skinless) turkey or chicken    Lean deli meats: turkey, lean ham, chicken, lean roast beef    Beans such as kidney, garbanzo, black, avery, or low-fat/fat free refried beans    Peanut butter (natural preferred). Limit to 1 Tbsp. per day.    Low-fat meatloaf (made with lean ground beef or turkey)    Sloppy Joes made with low-sugar ketchup and lean ground beef or turkey    Soy or vegetable protein (i.e. vegan crumbles, soy/veggie burger, tofu)    Hummus    Vegetables:    Fresh: cooked or raw (as tolerated)    Frozen vegetables    Canned vegetables (low sodium or no salt added, rinse before cooking/eating)    (Ok to have skins/peels/membranes/seeds - just chew well)    Fruits:    Fresh fruit    Frozen fruit (no sugar added)    Canned fruit (packed in its own juice, NOT syrup)    (Ok to have skins/peels/membranes/seeds - just chew well)    Starch:    Unsweetened whole-grain hot cereal (or high fiber cold cereal, dry)    Toasted whole wheat bread or [place] Thins    Whole grain crackers    Baked/boiled/mashed potato/sweet potato    Cooked whole grain pasta, brown rice, or other cooked whole grains    Starchy vegetables: corn, peas, winter squash    Protein Supplement:     Ready to drink protein shake with:  o 15-30 grams protein per serving  o Less than 10 grams total carbohydrate per serving     Protein powder mixed with:  o  Skim or 1% milk  o Low fat or fat free Lactaid  milk, plain or no sugar added soymilk  o Water   Fats: (use in moderation)    1 teaspoon of soft tub margarine    1 teaspoon olive oil, canola oil, or peanut oil    1 tablespoon of low-fat laughlin or salad dressing    Sample Menu for 9 months after Gastric Bypass    You do NOT need to eat/drink the full portion sizes listed below  Always stop when you are satisfied    Breakfast 3  diameter whole-wheat waffle with 1 Tbsp. of peanut butter  1 string cheese    Lunch   cup low-fat tuna salad (made with light mayonnaise)  3-4 small strips red pepper   Supplement Approved Protein Supplement  (Have between meals throughout the day)   Dinner   cup low-fat chicken stew      Breakfast   cup egg substitute, scrambled     cup sautéed vegetables in olive oil   Lunch   cup low fat or fat free cottage cheese  4-5  whole wheat crackers   Supplement Approved Protein Supplement   (Have between meals throughout the day)   Dinner 2 oz  grilled, broiled, or baked lemon pepper salmon    cup grilled asparagus     Breakfast   cup diced lean ham    cup mandarin oranges (canned in light juice)    Lunch 2 oz lean turkey lunchmeat    cup fresh tomatoes   Dinner 2 oz pork loin made in a crock pot seasoned with a spice rub    cup unsweetened applesauce   Supplement Approved Protein Supplement  (Have between meals throughout the day)   Breakfast   cup breakfast casserole made with egg substitute & turkey sausage    cup seedless melon   Lunch 2 oz  skinless chicken sautéed in 1 tsp. olive oil and herb seasonings    cup sliced zucchini   Dinner   cup chili made with ground turkey/sirloin or veggie crumbles and beans   3 whole-wheat saltine crackers   Supplement Approved Protein Supplement  (Have between meals throughout the day)     Breakfast 2 Tbsp. whole-grain cereal with   cup plain Greek yogurt   2 Tbsp. fresh berries   Lunch   cup crab mixed with 1-2 tsp. low-fat mayonnaise  4-5 Wheat Thins   Supplement Approved Protein Supplement   (Have between  meals throughout the day)   Dinner 2 oz. tender turkey breast (made in crock pot for extra moisture)  2 Tbsp. green beans  2 Tbsp. sweet potato     Breakfast   whole wheat English muffin, toasted  1 Tbsp. natural peanut butter    cup flavored light Greek yogurt   Lunch 3/4 cup black bean soup   Dinner 2 oz low-fat turkey meat loaf   2 Tbsp. mashed potato   2 Tbsp. cooked carrots   Supplement Approved Protein Supplement   (Have between meals throughout the day)     Breakfast   cup egg substitute  2 Tbsp. chopped bell pepper or mushrooms  2 Tbsp. kiwi   Lunch   cup canned chicken (in water) mixed with light mayonnaise  4-5 Wheat Thins    Supplement Approved Protein Supplement   (Have between meals throughout the day)   Dinner 6 medium shrimp  1 Tbsp. cocktail sauce  3 Tbsp. green beans

## 2021-06-07 NOTE — TELEPHONE ENCOUNTER
"Patient called from [place] today and said that she has been having some intense pain in her abdomen that feels like she is really bloated, mostly on the right side.  She hasn't been able to burp or pas much gas. Although, she had a BM on Saturday and Sunday but not since then.  She hasn't been taking any NSAIDS, not much stress, no smoking, but she has been drinking a pot of coffee daily until about a week ago when this started.  She states that she has been taking a lot of pepto bismal since it started and something called \"immatrol\" that a friend recommended.  She said that she has been eating Prego recently which seems to help a bit and floating in her pool today for three hours.  I encouraged her to take her omeprazole that she has at home and to stop the Pepto.  She hasn't had any vomiting or nausea and still struggles with getting in all of her fluids and protein at times.  We made a phone visit with Dr. Irby for tomorrow while she is driving up to [place] for another appointment and they can discuss further.  She never ended up having her 6 month post-op visit in December and never got the labs.  Kasia Goldberg RN  "

## 2021-06-08 ENCOUNTER — COMMUNICATION - HEALTHEAST (OUTPATIENT)
Dept: SURGERY | Facility: CLINIC | Age: 66
End: 2021-06-08

## 2021-06-08 ENCOUNTER — ANESTHESIA - HEALTHEAST (OUTPATIENT)
Dept: SURGERY | Facility: AMBULATORY SURGERY CENTER | Age: 66
End: 2021-06-08

## 2021-06-08 NOTE — PROGRESS NOTES
"Nydia Colindres is a 65 y.o. female who is being evaluated via a billable telephone visit.      The patient has been notified of following:     \"This telephone visit will be conducted via a call between you and your physician/provider. We have found that certain health care needs can be provided without the need for a physical exam.  This service lets us provide the care you need with a short phone conversation.  If a prescription is necessary we can send it directly to your pharmacy.  If lab work is needed we can place an order for that and you can then stop by our lab to have the test done at a later time.    If during the course of the call the physician/provider feels a telephone visit is not appropriate, you will not be charged for this service.\"     Nydia Colindres complains of    Chief Complaint   Patient presents with     Nutrition Counseling       I have reviewed and updated the patient's Past Medical History, Social History, Family History and Medication List.    ALLERGIES  Metrizamide; Nsaids (non-steroidal anti-inflammatory drug); Sulfa (sulfonamide antibiotics); and Diatrizoate meglumine    Additional provider notes:     Post-op Surgical Weight Loss Diet Evaluation     Assessment:  Pt presents for 1 year post-op RD visit, s/p RYGB reviision on 6-5-19 with Dr. Fishman. Today we reviewed current eating habits and level of physical activity, and instructed on the changes that are required for successful bariatric outcomes.    Patient Progress: patient is unable to do exercise due to pool in [place] being closed and feels as though it is too cold in [place] to walk    Pt's Initial Weight: 286 lbs  Weight: 165 lb (74.8 kg) (about a month ago)  Weight loss from initial: 121  % Weight loss: 42.31 %      Body mass index is 30.18 kg/m .     Concerns: patient is unable to tolerate much protein, inadequate water intake     Vitamins   Multi Vit with Iron: yes  Calcium Citrate: yes  B12: yes  D3: yes    Do you experience " "hunger? yes  Do you have \"dumping\" syndrome? yes    With what foods: patient can't recall  Do you experience any reflux or discomfort with eating? yes  -Are you still taking omeprazole? Yes, when needing it  Nausea: yes  Vomiting: yes  Diarrhea: yes  Constipation: no  Hair loss:patient is unsure- patient lost hair with first surgery, is doing the [employer]    Diet Recall/Time:   Breakfast: 1/2 small croissant, egg and cheese and a piece of centeno  Am Snack: none  Lunch: none  Pm snack:none  Dinner: taco salad  HS Snack: cup of popcorn    Proteins/Veg/Fruits/CHO (NOT well tolerated): struggles with tolerating meat    Estimated portion size per meals:2 cup/meal    Fluid Intake  Water: 20 oz  Caffeine: 2 cups regular    Exercise: walking very little      PES statement:      (NB-1.7) Undesirable food choices related to Failure to adjust for lifestyle changes as evidenced by low protein intake at meals; large portions; skipping meals; frequent grazing;  mindless eating ; drinking with meals, not chewing each bite to applesauce consistency; consuming caffeine/carbonation daily, frequent restaurant intake; and no structured activity regimen  (NI-5.7.1) Inadequate protein intake related to Gastric bypass causing increased nutrient needs due to malabsorption/ Decreased ability to consume sufficient protein as evidenced by Edema, poor musculature, dull skin, thin and fragile hair; and Estimated intake of protein insufficient to meet requirements    Intervention    Discussion  1. Discussed 1 year Post-Op Nutritional Guidelines for RYGB  2. Recommended to consume 15-20gm protein at 3 meals daily, along with protein supplement/\"planned protein containing snack\" of 15-30gm protein, to reach goal of 60-80 gm protein daily.  3. Educated on post-op vitamin regimen: Multi Vit + iron 2x/day, calcium citrate 400-600 mg 2x/day, 8075-4032 mcg of Sublingual B-12 daily, and 5000 IU Vitamin D3 daily (MVI and calcium can be taken at the same " "time BID)  4. Reviewed lean protein sources  5. Bariatric Plate Method-  including lean/low fat protein at each meal, including a vegetable/fruit, and limiting carbohydrate intake to less than 25% of plate volume. Approved perimeters of post op meal portion sizes according to 12 months post op guidelines.  Instructions  1. Include 15-20gm protein at each meal, along with protein supplement/\"planned protein containing snack\" of 15-30gm protein, to reach goal of 60-80 gm protein daily.  2. Increase fluid intake to 64oz daily: choose plain or calorie/carbonation-free beverages.  3. Incorporate daily structured activity, 30-60 minutes most days of the week  4. Recommended pt to start taking: Multi Vit + iron 2x/day, calcium citrate 400-600 mg 2x/day, 8140-6693 mcg of Sublingual B-12 daily, and 5000 IU Vitamin D3 daily. (MVI and calcium can be taken at the same time)  5. Read food labels more consistently: keeping total fat grams <10, total sugar grams <10, fiber >3gm per serving.  6. Increase vegetable/fruit intake, by having a vegetable or fruit with each meal daily.  7. Practice plate method: 1/2 plate lean/low fat protein source, vegetable/fruit, <25% of plate complex carbohydrates.  8. Separate fluids 30 minutes before/after meal times.  9. Practice eating off of smaller plates/bowls, chewing to applesauce consistency, taking 20-30 minutes to eat in a calm/relaxed environment without distractions of tv/email/cell phone.    Personal Goals:  1. Encouraged patient to make a list of protein foods that she can tolerate and make sure she eats one of those at each meal    Assessment/Plan:    Pt to follow up for 1 year  post-op visit with bariatrician   Labs in house Yes (for 3 and 9 month visit only)    Phone call duration: 22 minutes    Gabriela Tejeda RD    "

## 2021-06-08 NOTE — PROGRESS NOTES
"Nydia Colindres is a 65 y.o. female who is being evaluated via a billable telephone visit.      The patient has been notified of following:     \"This telephone visit will be conducted via a call between you and your physician/provider. We have found that certain health care needs can be provided without the need for a physical exam.  This service lets us provide the care you need with a short phone conversation.  If a prescription is necessary we can send it directly to your pharmacy.  If lab work is needed we can place an order for that and you can then stop by our lab to have the test done at a later time.    Telephone visits are billed at different rates depending on your insurance coverage. During this emergency period, for some insurers they may be billed the same as an in-person visit.  Please reach out to your insurance provider with any questions.    If during the course of the call the physician/provider feels a telephone visit is not appropriate, you will not be charged for this service.\"    Patient has given verbal consent to a Telephone visit? Yes    What phone number would you like to be contacted at? [phone]    Patient would like to receive their AVS by AVS Preference: Mail a copy.    Additional provider notes: see below    Bariatric Follow Up Visit with a History of Previous Bariatric Surgery     Date of visit: 6/5/2020  Physician: Alise Irby MD  Primary Care Provider:  Charanjit Carty MD  Nydia Colindres   65 y.o.  female    Date of Surgery: 6/5/2019  Initial Weight: 315# was her high before her VBG in 2004 (196# prior to her revision)  Initial BMI: 35.85  Today's Weight:   Wt Readings from Last 1 Encounters:   06/05/20 165 lb (74.8 kg)     Body mass index is 30.18 kg/m .      Assessment and Plan     Assessment: Nydia is a 65 y.o. year old female who is 1 yr s/p  Lexi en Y Gastric Bypass with Dr. Sravanthi Stafford Afua Colindres feels as if she has achieved the goals she hoped to accomplish through " bariatric surgery and weight loss.  This was a revision from her VBG which caused vomiting and dys    Encounter Diagnoses   Name Primary?     Postoperative malabsorption Yes     Dyslipidemia      Uncontrolled type 2 diabetes mellitus without complication, without long-term current use of insulin (H)      Type 2 diabetes mellitus without complication, without long-term current use of insulin (H)          Current Outpatient Medications:      calcium citrate-vitamin D3 (CITRACAL + D) 315 mg- 250 unit per tablet, Take 1 tablet by mouth 2 (two) times a day., Disp: , Rfl:      cyanocobalamin, vitamin B-12, 1,000 mcg Subl, Place 1,000 mcg under the tongue daily., Disp: , Rfl:      levothyroxine (SYNTHROID, LEVOTHROID) 175 MCG tablet, Take 175 mcg by mouth Daily at 6:00 am., Disp: , Rfl:      lisinopril (PRINIVIL,ZESTRIL) 10 MG tablet, Take 10 mg by mouth daily., Disp: , Rfl:      multivitamin with minerals (HAIR,SKIN AND NAILS ORAL), Take 1 tablet by mouth daily., Disp: , Rfl:      pediatric multivitamin (FLINTSTONES/EXTRA C) Chew chewable tablet, Chew 1 tablet daily., Disp: , Rfl:      pediatric multivitamin-iron (POLY-VI-SOL WITH IRON) chewable tablet, Chew 1 tablet 2 (two) times a day., Disp: , Rfl:      TiZANidine (ZANAFLEX) 2 MG capsule, Take 2 mg by mouth daily., Disp: , Rfl:      tocilizumab (ACTEMRA) 162 mg/0.9 mL Syrg subcutaneous injection, Inject 162 mg under the skin once a week. On Sundays   , Disp: , Rfl:      tofacitinib (XELJANZ) 5 mg tablet, Take 2 tablets by mouth daily., Disp: , Rfl:      metFORMIN (GLUCOPHAGE) 500 MG tablet, Take 2 tablets (1,000 mg total) by mouth 2 (two) times a day with meals., Disp: 120 tablet, Rfl: 0    Plan: Labs ordered    Return in about 6 months (around 12/5/2020).    Bariatric Surgery Review     Interim History/LifeChanges: a lot of aches and pains. ?FM ?arthritis. Happy with her RYGB. Much better than VBG. Feels healthy. Would like to lose more weight. Her kids think she is  "too thin. Had been on a statin in the past. Not checking her sugars    Patient Concerns: see above  Appetite (1-10): OK  GERD: no    Medication changes: no longer on prednisone after 17 years. No methotrexate    Vitamin Intake:   B-12   SL   MVI  3/d ([place]'s with iron and extra C immune   Vitamin D  5,000   Calcium   citrate     Other  C              LABS: ordered    Nausea no  Vomiting if too dry-rare  Constipation no (stool softener)  Diarrhea no  Rashes no  Hair Loss no  Calf tenderness no  Breathing difficulty no  Reactive Hypoglycemia avoids  Light Headedness no   Moods stable and OK    12 point ROS as above and otherwise negative      Habits:  Alcohol: no  Tobacco: no  Caffeine 2c/d  NSAIDS no  Exercise Routine: [employer]. Walking 2X/d  3 meals/day yes  Protein first yes  60 grams/day  Water Separate from meals yes  Calorie Containing Beverages maybe 4 ounces regular  Restaurant eating/wk rare  Sleeping 4 hours even with tizanidine \"my brain doesn't shut off\"-taking care of others  Stress always high with her mom  CPAP: NA  Contraception: PM  DEXA:ordered    Social History     Social History     Socioeconomic History     Marital status:      Spouse name: Not on file     Number of children: Not on file     Years of education: Not on file     Highest education level: Not on file   Occupational History     Not on file   Social Needs     Financial resource strain: Not on file     Food insecurity     Worry: Not on file     Inability: Not on file     Transportation needs     Medical: Not on file     Non-medical: Not on file   Tobacco Use     Smoking status: Former Smoker     Packs/day: 3.00     Years: 14.00     Pack years: 42.00     Smokeless tobacco: Never Used     Tobacco comment: age 15 to 29 up to 3ppd   Substance and Sexual Activity     Alcohol use: Not Currently     Drug use: No     Sexual activity: Yes     Partners: Male     Birth control/protection: Post-menopausal   Lifestyle     " Physical activity     Days per week: Not on file     Minutes per session: Not on file     Stress: Not on file   Relationships     Social connections     Talks on phone: Not on file     Gets together: Not on file     Attends Jain service: Not on file     Active member of club or organization: Not on file     Attends meetings of clubs or organizations: Not on file     Relationship status: Not on file     Intimate partner violence     Fear of current or ex partner: Not on file     Emotionally abused: Not on file     Physically abused: Not on file     Forced sexual activity: Not on file   Other Topics Concern     Not on file   Social History Narrative     2 daughters 1 granddaughter    Retired. Worked for [employer]. Working at [employer] in [place] 2-3d/wk       Past Medical History     Past Medical History:   Diagnosis Date     Asthma      Depression      Diabetes mellitus (H) 2002     Diabetic neuropathy (H)      Disease of thyroid gland     hypothyroid     Elevated liver enzymes     ?d/t anti rheumatic injectables     Fibromyalgia, primary      Foot pain      GERD (gastroesophageal reflux disease)      Hypertension      Lumbar back pain      Metabolic syndrome      Morbid obesity (H)      Rheumatoid arthritis (H)     age 39     Sleep apnea     CPAP     Urgency incontinence      Problem List     Patient Active Problem List   Diagnosis     Uncontrolled type 2 diabetes mellitus without complication, without long-term current use of insulin (H)     Dyslipidemia     Fibromyalgia     History of gastric bypass     Hypothyroidism, unspecified type     Insomnia     Olecranon bursitis of left elbow     Poorly controlled type 2 diabetes mellitus (H)     Depression     Fibromyalgia, primary     GERD (gastroesophageal reflux disease)     Diabetes mellitus (H)     Rheumatoid arthritis (H)     Elevated liver enzymes     Sleep apnea     Disease of thyroid gland     Urgency incontinence     Asthma     Hypertension      Metabolic syndrome     Morbid obesity (H)     Lumbar back pain     Foot pain     Diabetic neuropathy (H)     Screening-pulmonary TB     Encounter for long-term (current) use of high-risk medication     S/P gastric bypass     Dehydration     Nausea     Diarrhea     Luetscher's syndrome     Type 2 diabetes mellitus without complication, without long-term current use of insulin (H)     Intractable vomiting with nausea, unspecified vomiting type     Other dysphagia     Sicca (H)     Medications     Current Outpatient Medications   Medication Sig Note     calcium citrate-vitamin D3 (CITRACAL + D) 315 mg- 250 unit per tablet Take 1 tablet by mouth 2 (two) times a day.      cyanocobalamin, vitamin B-12, 1,000 mcg Subl Place 1,000 mcg under the tongue daily.      levothyroxine (SYNTHROID, LEVOTHROID) 175 MCG tablet Take 175 mcg by mouth Daily at 6:00 am. 6/5/2019: Hasn't been taking recently due to insurance issues     lisinopril (PRINIVIL,ZESTRIL) 10 MG tablet Take 10 mg by mouth daily. 6/5/2019: Hasn't been taking recently due to insurance issues     multivitamin with minerals (HAIR,SKIN AND NAILS ORAL) Take 1 tablet by mouth daily.      pediatric multivitamin (FLINTSTONES/EXTRA C) Chew chewable tablet Chew 1 tablet daily.      pediatric multivitamin-iron (POLY-VI-SOL WITH IRON) chewable tablet Chew 1 tablet 2 (two) times a day.      TiZANidine (ZANAFLEX) 2 MG capsule Take 2 mg by mouth daily.      tocilizumab (ACTEMRA) 162 mg/0.9 mL Syrg subcutaneous injection Inject 162 mg under the skin once a week. On Sundays            tofacitinib (XELJANZ) 5 mg tablet Take 2 tablets by mouth daily.      metFORMIN (GLUCOPHAGE) 500 MG tablet Take 2 tablets (1,000 mg total) by mouth 2 (two) times a day with meals.      Surgical History     Past Surgical History  She has a past surgical history that includes Hiatal hernia repair (2005); Gastroplasty vertical banded (11/22/2004); Cholecystectomy; Tympanostomy tube placement; Myringotomy;  "Appendectomy; Gastroplasty (N/A, 6/5/2019); and pr esophagogastroduodenoscopy transoral diagnostic (N/A, 8/27/2019).    Objective-Exam     Constitutional:  Ht 5' 2\" (1.575 m)   Wt 165 lb (74.8 kg) Comment: pt reported- 6 months ago  BMI 30.18 kg/m    Height: 5' 2\" (1.575 m) (6/5/2020  9:31 AM)  Initial Weight: 286 lbs (6/5/2020  9:31 AM)  Weight: 165 lb (74.8 kg) (pt reported- 6 months ago) (6/5/2020  9:31 AM)  Weight loss from initial: 121 (6/5/2020  9:31 AM)  % Weight loss: 42.31 % (6/5/2020  9:31 AM)  BMI (Calculated): 30.2 (6/5/2020  9:31 AM)  SpO2: 98 % (8/27/2019 10:50 AM)    General:  Pleasant and in no acute distress   Psychiatric: alert and oriented X3, mood and affect normal    Counseling     We reviewed the important post op bariatric recommendations:  -eating 3 meals daily  -eating protein first, getting >60gm protein daily  -eating slowly, chewing food well  -avoiding/limiting calorie containing beverages  -drinking water 15-30 minutes before or after meals  -choosing wheat, not white with breads, crackers, pastas, juan, bagels, tortillas, rice  -limiting restaurant or cafeteria eating to twice a week or less    We discussed the importance of restorative sleep and stress management in maintaining a healthy weight.  We discussed the National Weight Control Registry healthy weight maintenance strategies and ways to optimize metabolism.  We discussed the importance of physical activity including cardiovascular and strength training in maintaining a healthier weight.    We discussed the importance of life-long vitamin supplementation and life-long  follow-up.    Nydia was reminded that, to avoid marginal ulcers she should avoid tobacco at all, alcohol in excess, caffeine in excess, and NSAIDS (unless indicated for cardioprotection or othewise and opposed by a PPI).    Alise Irby MD, [facility].  6/5/2020  9:43 AM      10:13    Phone call duration: 28 minutes    Alise" Everton Irby MD

## 2021-06-08 NOTE — PATIENT INSTRUCTIONS - HE
[facility]  Nutritional Guidelines  Gastric Bypass 12 Months Post Op    General Guidelines and Helpful Hints:    Eat 3 meals per day + protein supplement(s). No snacks between meals.  o Do not skip meals.  This can cause overeating at the next meal and will prevent adequate protein and nutritional intake.    Aim for 60-80 grams of protein per day.  o Always eat your protein first. This assists with optimal nutrition and helps you stay full longer.  o Depending on your portion size, you may need to drink approved protein supplement between meals to achieve protein goals. Follow recommendations of your Dietitian.     Eat your protein first, and then follow with fiber.   o It is not necessary to count your fiber, but 15-20 grams per day is recommended.    o Add fiber by including fruits, vegetables, whole grains, and beans.     Portions should be about 1 cup per meal. Use measuring cups to be accurate.    Continue to use saucer/salad plates, infant/toddler silverware to keep portion sizes small and take small bites.    Eat S-L-O-W-L-Y to make each meal last 20-30 minutes. Always stop eating when satisfied.    Continue to use caution with foods containing skins, peels or membranes. Chew well!    Aim for 64 oz. of calorie-free fluids daily.  o Continue to avoid caffeine and carbonation. If you choose to drink alcohol, do so in moderation.   o Remember to avoid drinking during meals, 15-30 minutes before and 30 minutes after.    Exercise is trotter for continued weight loss and weight maintenance. Aim for 30-60 minutes of physical activity most days of the week. Include cardiovascular and strength training.    If having trouble tolerating meat, try using a crock-pot, tinfoil tent, steamer or other moist cooking method to create tender meats. Add broth or low-fat gravy to help meat stay moist.     Avoid high sugar and high fat foods to prevent dumping syndrome.  o Check nutrition labels for less than 10 grams of  sugar and less than 10 grams of fat per serving.    Continue Taking Vitamins/Minerals:  o 2450-7059 mcg of Sublingual B-12 daily  o 1 Multivitamin with Iron twice daily (chewable or swallow tabs)  o 500-600 mg Calcium Citrate twice daily (chewable or swallow tabs)  o 5000 IU Vitamin D3 daily    Sample Grocery List    Protein:    Fat free Greek or light yogurt (less than 10 grams sugar)    Fat free or low-fat cottage cheese    String cheese or reduced fat cheese slices    Tuna, salmon, crab, egg, or chicken salad made with light or fat free mayonnaise    Egg or Egg Substitute    Lean/extra lean turkey, beef, bison, venison (ground, sirloin, round, flank)    Pork loin or tenderloin (grilled, baked, broiled)    Fish such as salmon, tuna, trout, tilapia, etc. (grilled, baked, broiled)    Tender cuts of lean (skinless) turkey or chicken    Lean deli meats: turkey, lean ham, chicken, lean roast beef    Beans such as kidney, garbanzo, black, avery, or low-fat/fat free refried beans    Peanut butter (natural preferred). Limit to 1 Tbsp. per day.    Low-fat meatloaf (made with lean ground beef or turkey)    Sloppy Joes made with low-sugar ketchup and lean ground beef or turkey    Soy or vegetable protein (i.e. vegan crumbles, soy/veggie burger, tofu)    Hummus    Vegetables:    Fresh: cooked or raw (as tolerated)    Frozen vegetables    Canned vegetables (low sodium or no salt added, rinse before cooking/eating)    (Ok to have skins/peels/membranes/seeds - just chew well)    Fruits:    Fresh fruit    Frozen fruit (no sugar added)    Canned fruit (packed in its own juice, NOT syrup)    (Ok to have skins/peels/membranes/seeds - just chew well)    Starch:    Unsweetened whole-grain hot cereal (or high fiber cold cereal, dry)    Toasted whole wheat bread or [place] Thins    Whole grain crackers    Baked/boiled/mashed potato/sweet potato    Cooked whole grain pasta, brown rice, or other cooked whole grains    Starchy vegetables:  corn, peas, winter squash      Protein Supplement:     Ready to drink protein shake with:  o 15-30 grams protein per serving  o Less than 10 grams total carbohydrate per serving     Protein powder mixed with:  o  Skim or 1% milk  o Low fat or fat free Lactaid milk, plain or no sugar added soymilk  o Water     Fats: (use in moderation)    1 teaspoon of soft tub margarine    1 teaspoon olive oil, canola oil, or peanut oil    1 tablespoon of low-fat laughlin or salad dressing     Sample Menu for 12 months after Gastric Bypass    You do NOT need to eat/drink the full portion sizes listed below  Always stop when you are satisfied    Breakfast   cup 1% cottage cheese     cup diced peaches   Lunch   slice whole grain bread/toast with 1 tsp. light laughlin  2 oz. sliced lean turkey, ham, or chicken    cup carrots   Supplement Approved protein supplement (as needed between meals)   Dinner   cup 93% lean ground beef mixed with 2 Tbsp. marinara sauce     cup green beans    cup whole grain pasta     Breakfast   cup egg substitute or 2 egg whites, scrambled   1 oz low fat shredded cheese    cup sautéed chopped vegetables mixed in   Lunch 1 cup chili made with lean ground beef or turkey   Supplement 6 oz light Greek yogurt (as needed)   Dinner 3 oz  grilled, broiled, or baked lemon pepper salmon  2 Tbsp. grilled asparagus  2 Tbsp. baked sweet potato     Breakfast   cup whole grain oatmeal made with skim milk and unflavored protein powder added    cup blueberries       Lunch 3 oz  meatloaf made with lean ground turkey    cup steamed broccoli   Dinner 3 oz pork loin made in a crock pot seasoned with a spice rub    cup cooked carrots   Supplement Approved protein supplement (as needed between meals)     Breakfast   cup egg scramble made with egg substitute and turkey sausage    whole grain English muffin with 1 teaspoon low sugar jelly   Lunch 3 oz seasoned, skinless grilled chicken     cup grilled vegetables   Dinner 2 oz lean beef    cup  brown rice    cup strawberries   Supplement 1 string cheese (as needed)     Breakfast 6 ounces light Greek yogurt    cup unsweetened mixed berries   Lunch 3 oz shrimp, with low-sugar cocktail sauce for dipping    cup pea pods   Supplement   cup fat free cottage cheese (as needed)   Dinner 3 oz tender turkey breast (made in crock pot for extra moisture)    of a small whole wheat dinner roll     Breakfast     cup low fat cottage cheese    cup strawberries   Lunch   cup black bean soup  4 whole grain crackers   Supplement 1 cup skim milk with scoop of protein powder added (as needed)   Dinner 3 oz. grilled tilapia with lemon pepper seasoning    cup grilled bell peppers     Breakfast 2 ounces turkey sausage jada    whole wheat English muffin   Supplement Approved protein supplement (as needed between meals)   Lunch 3 oz lean ham, turkey, or chicken     cup tomatoes   Dinner 2 oz. sirloin steak    cup mixed vegetables    cup brown rice     [facility]  Nutritional Guidelines  Gastric Bypass 3 Months Post Op    General Guidelines and Helpful Hints:    Eat 3 meals per day + protein supplement(s). No snacks between meals.  o Do not skip meals.  This can cause overeating at the next meal and will prevent adequate protein and nutritional intake.    Aim for 60-80 grams of protein per day.   o Always eat your protein first. This assists with optimal nutrition and helps you stay full longer.  o To achieve daily protein goals, it is recommended to drink approved protein supplement between meals.    Follow appropriate portion size: Use measuring cups to be accurate.    Months Post Op Portion Size per Meal   3 months 1/4 cup   4-5 months 1/3 cup   5-6 months 1/2 cup       Continue to use saucer/salad plates, infant/toddler silverware to keep portion sizes small and take small bites.    Eat S-L-O-W-L-Y to make each meal last 20-30 minutes. Always stop eating when satisfied.    Use caution with foods containing skins,  peels or membranes. Chew well!    Aim for 64 oz. of calorie-free fluids daily.  o Continue to avoid caffeine, carbonation and alcohol.  o Remember to avoid drinking during meals, 15-30 minutes before and 30 minutes after.    Aim for 30-60 minutes of physical activity most days of the week.    If having trouble tolerating meat, try using a crock-pot, tinfoil tent, steamer or other moist cooking method to create tender meats. Add broth or low-fat gravy to help meat stay moist.     Avoid high sugar and high fat foods to prevent dumping syndrome.  o Check nutrition labels for less than 10 grams of sugar and less than 10 grams of fat per serving.    Continue Taking Vitamins/Minerals:  o 9812-7553 mcg of Sublingual B-12 daily  o 1 Multivitamin with Iron twice daily (chewable or swallow tabs)  o 500-600 mg Calcium Citrate twice daily (chewable or swallow tabs)  o 5000 IU Vitamin D3 daily    Sample Grocery List    Protein:    Fat free Greek or light yogurt (less than 10 grams sugar)    Fat free or low-fat cottage cheese    String cheese or reduced fat cheese slices    Tuna, salmon, crab, egg, or chicken salad made with light or fat free mayonnaise    Egg or Egg Substitute    Lean/extra lean turkey, beef, bison, venison (ground, sirloin, round, flank)    Pork loin or tenderloin (grilled, baked, broiled)    Fish such as salmon, tuna, trout, tilapia, etc. (grilled, baked, broiled)    Tender cuts of lean (skinless) turkey or chicken    Lean deli meats: turkey, lean ham, chicken, lean roast beef    Beans such as kidney, garbanzo, black, avery, or low-fat/fat free refried beans    Peanut butter (natural preferred). Limit to 1 Tbsp. per day.    Low-fat meatloaf (made with lean ground beef or turkey)    Sloppy Joes made with low-sugar ketchup and lean ground beef or turkey    Soy or vegetable protein (i.e. vegan crumbles, soy/veggie burger, tofu)    Hummus    Vegetables:    Fresh: cooked or raw (as tolerated)    Frozen  vegetables    Canned vegetables (low sodium or no salt added, rinse before cooking/eating)    (Ok to have skins/peels/membranes/seeds - just chew well)    Fruits:    Fresh fruit    Frozen fruit (no sugar added)    Canned fruit (packed in its own juice, NOT syrup)    (Ok to have skins/peels/membranes/seeds - just chew well)    Starch:    Unsweetened whole-grain hot cereal (or high fiber cold cereal, dry)    Toasted whole wheat bread or [place] Thins    Whole grain crackers    Baked/boiled/mashed potato/sweet potato    Cooked whole grain pasta, brown rice, or other cooked whole grains    Starchy vegetables: corn, peas, winter squash    Protein Supplement:     Ready to drink protein shake with:  o 15-30 grams protein per serving  o Less than 10 grams total carbohydrate per serving     Protein powder mixed with:  o  Skim or 1% milk  o Low fat or fat free Lactaid milk, plain or no sugar added soymilk  o Water     Fats: (use in moderation)    1 teaspoon of soft tub margarine    1 teaspoon olive oil, canola oil, or peanut oil    1 tablespoon of low-fat laughlin or salad dressing     Sample Menu for 3 months after Gastric Bypass    You do NOT need to eat/drink the full portion sizes listed below  Always stop when you are satisfied    Breakfast   cup of 1% cottage cheese    Lunch 3 Tbsp. tuna salad made with 1 tsp. light laughlin  1 Tbsp. green beans   Supplement Approved Protein Shake   (Have between meals throughout the day)   Dinner 3 Tbsp. moist cooked chicken breast   1 Tbsp. canned peaches in light syrup     Breakfast 1 egg or   cup egg substitute, scrambled    Lunch 3 Tbsp. sirloin steak  1 Tbsp. baked potato   Supplement Approved Protein Shake   (Have between meals throughout the day)   Dinner 3 Tbsp. grilled, broiled, or baked lemon pepper salmon  1 Tbsp. asparagus     Breakfast   cup light yogurt with 1-2 tsp. whole grain cereal   Lunch   cup thick lentil soup   Dinner 3 Tbsp. pork loin made in a crock pot for added  moisture  1 Tbsp. cooked broccoli   Supplement Approved Protein Shake   (Have between meals throughout the day)       Breakfast 1 oz. turkey or low fat breakfast sausage   Lunch 3 Tbsp. 1% cottage cheese  1 Tbsp. peaches   Dinner 3 Tbsp. chili made with ground turkey breast and beans  1 Tbsp. cooked zucchini   Supplement Approved Protein Shake   (Have between meals throughout the day)       Breakfast 1 egg, scrambled with 1-2 tsp. shredded low fat cheese   Lunch 3 Tbsp. low fat lunch meat  2-3 Wheat Thins   Supplement Approved Protein Shake   (Have between meals throughout the day)   Dinner 1 oz. tender skinless/boneless chicken breast       Breakfast   piece whole wheat toast with 1 Tbsp. natural peanut butter   Lunch   cup lean hamburger mixed with marinara sauce (10 grams of sugar or less per serving)   Dinner 3 Tbsp. low fat turkey meat loaf   1 Tbsp. cooked sweet potato   Supplement Approved Protein Shake   (Have between meals throughout the day)       Breakfast 3 Tbsp. scrambled egg or   cup of egg substitute  1 Tbsp. sautéed vegetables   Lunch 3 Tbsp. canned (in water) chicken made with light mayonnaise  3  Wheat  Thins   Supplement Approved Protein Shake   (Have between meals throughout the day)   Dinner 3 Tbsp. low-fat sloppy joes (made with low sugar ketchup)  1 Tbsp. squash

## 2021-06-09 ENCOUNTER — TRANSFERRED RECORDS (OUTPATIENT)
Dept: HEALTH INFORMATION MANAGEMENT | Facility: CLINIC | Age: 66
End: 2021-06-09

## 2021-06-09 ENCOUNTER — RECORDS - HEALTHEAST (OUTPATIENT)
Dept: ADMINISTRATIVE | Facility: OTHER | Age: 66
End: 2021-06-09

## 2021-06-09 ENCOUNTER — SURGERY - HEALTHEAST (OUTPATIENT)
Dept: SURGERY | Facility: AMBULATORY SURGERY CENTER | Age: 66
End: 2021-06-09
Payer: COMMERCIAL

## 2021-06-09 ASSESSMENT — MIFFLIN-ST. JEOR: SCORE: 1214.01

## 2021-06-10 ENCOUNTER — OFFICE VISIT - HEALTHEAST (OUTPATIENT)
Dept: SURGERY | Facility: CLINIC | Age: 66
End: 2021-06-10

## 2021-06-10 DIAGNOSIS — E66.3 OVERWEIGHT: ICD-10-CM

## 2021-06-10 DIAGNOSIS — Z98.84 BARIATRIC SURGERY STATUS: ICD-10-CM

## 2021-06-10 DIAGNOSIS — Z71.3 NUTRITIONAL COUNSELING: ICD-10-CM

## 2021-06-10 DIAGNOSIS — E11.9 TYPE 2 DIABETES MELLITUS WITHOUT COMPLICATION, WITHOUT LONG-TERM CURRENT USE OF INSULIN (H): ICD-10-CM

## 2021-06-13 NOTE — PATIENT INSTRUCTIONS - HE
[facility]  Nutritional Guidelines  Gastric Bypass 18 Months Post Op and Beyond    General Guidelines and Helpful Hints:    Eat 3 meals per day + protein supplement(s). No snacks between meals.  o Do not skip meals.  This can cause overeating at the next meal and will prevent adequate protein and nutritional intake.    Aim for 60-80 grams of protein per day.  o Always eat your protein first. This assists with optimal nutrition and helps you stay full longer.  o Depending on your portion size, you may need to drink approved protein supplement between meals to achieve protein goals. Follow recommendations of your Dietitian.     Eat your protein first, and then follow with fiber.   o It is not necessary to count your fiber, but 15-20 grams per day is recommended.    o Add fiber by including fruits, vegetables, whole grains, and beans.     Portions should remain about 1 cup per meal. Use measuring cups to be accurate.    Continue to use saucer/salad plates, infant/toddler silverware to keep portion sizes small and take small bites.    Eat S-L-O-W-L-Y to make each meal last 20-30 minutes. Always stop eating when satisfied.    Continue to use caution with foods containing skins, peels or membranes. Chew well!    Aim for 64 oz. of calorie-free fluids daily.  o Continue to avoid caffeine and carbonation. If you choose to drink alcohol, do so in moderation.   o Remember to avoid drinking during meals, 15-30 minutes before and 30 minutes after.    Exercise is trotter for continued weight loss and weight maintenance. Aim for 30-60 minutes of physical activity most days of the week. Include cardiovascular and strength training.    If having trouble tolerating meat, try using a crock-pot, tinfoil tent, steamer or other moist cooking method to create tender meats. Add broth or low-fat gravy to help meat stay moist.     Avoid high sugar and high fat foods to prevent dumping syndrome.  o Check nutrition labels for less  than 10 grams of sugar and less than 10 grams of fat per serving.    Continue Taking Vitamins/Minerals:  o 2045-1108 mcg of Sublingual B-12 daily  o 1 Multivitamin with Iron twice daily (chewable or swallow tabs)  o 500-600 mg Calcium Citrate twice daily (chewable or swallow tabs)  o 5000 IU Vitamin D3 daily  [facility]  Grocery List  Protein:    Fat free Greek or light yogurt (less than 10 grams sugar)    Fat free or low-fat cottage cheese    String cheese or reduced fat cheese slices    Tuna, salmon, crab, egg, or chicken salad made with light or fat free mayonnaise    Egg or Egg Substitute    Lean/extra lean turkey, beef, bison, venison (ground, sirloin, round, flank)    Pork loin or tenderloin (grilled, baked, broiled)    Fish such as salmon, tuna, trout, tilapia, etc. (grilled, baked, broiled)    Tender cuts of lean (skinless) turkey or chicken    Lean deli meats: turkey, lean ham, chicken, lean roast beef    Beans such as kidney, garbanzo, black, avery, or low-fat/fat free refried beans    Peanut butter (natural preferred). Limit to 1 Tbsp. per day.    Low-fat meatloaf (made with lean ground beef or turkey)    Sloppy Joes made with low-sugar ketchup and lean ground beef or turkey    Soy or vegetable protein (i.e. vegan crumbles, soy/veggie burger, tofu)    Hummus    Vegetables:    Fresh: cooked or raw (as tolerated)    Frozen vegetables    Canned vegetables (low sodium or no salt added, rinse before cooking/eating)    (Ok to have skins/peels/membranes/seeds - just chew well)    Fruits:    Fresh fruit    Frozen fruit (no sugar added)    Canned fruit (packed in its own juice, NOT syrup)    (Ok to have skins/peels/membranes/seeds - just chew well)    Starch:    Unsweetened whole-grain hot cereal (or high fiber cold cereal, dry)    Toasted whole wheat bread or [place] Thins    Whole grain crackers    Baked /boiled/mashed potato/sweet potato    Cooked whole grain pasta, brown rice, or other cooked  whole grains    Starchy vegetables: corn, peas, winter squash        Protein Supplement:     Ready to drink protein shake with:  o 15-30 grams protein per serving  o Less than 10 grams total carbohydrate per serving     Protein powder mixed with:  o  Skim or 1% milk  o Low fat or fat free Lactaid milk, plain or no sugar added soymilk  o Water     Fats: (use in moderation)    1 teaspoon of soft tub margarine    1 teaspoon olive oil, canola oil, or peanut oil    1 tablespoon of low-fat laughlin or salad dressing   -------------------------------------------------------------------------------------------------------------------------------  Sample Menu for 18+ months after Gastric Bypass    You do NOT need to eat/drink the full portion sizes listed below  Always stop when you are satisfied    Breakfast   cup 1% cottage cheese     cup mixed berries   Lunch 2 oz lean roast beef on   [place] Thin with 1 tsp. light laughlin    small tomato, chopped, mixed with 1 tsp. light vinaigrette dressing   Supplement Approved protein supplement (if needed between meals)   Dinner 2 oz grilled salmon    cup salad greens with 1 tsp. light salad dressing and 1 tsp. ground flax seed    cup quinoa or brown rice     Breakfast   cup egg substitute with   cup sautéed chopped vegetables  2 light [place] Krisp crackers   Lunch Tuna Melt:   cup tuna mixed with 1 tsp. light laughlin over   [place] Thin. Top with 2-3 slices cucumber and 1 oz slice of low fat cheese   Supplement 1 cup skim milk (if needed between meals)   Dinner 3 oz  grilled, broiled, or baked seasoned skinless chicken breast    cup asparagus     Breakfast   cup plain oatmeal made with skim or 1% milk with 1 Tbsp. flavored/unflavored protein powder added  1 mozzarella string cheese   Lunch 2 oz deli turkey breast  1/3 cup salad with 1 tsp. light salad dressing, 1/8 of a whole avocado and 1 Tbsp. sunflower seeds   Dinner 3 oz. pork loin made in a crock pot, seasoned with a spice rub    cup  cooked carrots   Supplement Approved protein supplement (if needed between meals)     Breakfast 1 cup breakfast casserole made with egg substitute, turkey sausage,  and steamed, chopped bell peppers   Supplement  1 cup light Greek yogurt (if needed between meals)   Lunch 2 oz. teriyaki turkey    cup mashed sweet potato with 1-2 spritzes of spray butter (like [place])    cup fresh pineapple   Dinner 3 oz low fat meatloaf    cup roasted garlic zucchini     Breakfast   cup leftover breakfast casserole    cup no sugar added applesauce with 1 Tbsp. unflavored protein powder and a sprinkle of cinnamon    Lunch 3 oz shrimp with 1-2 Tbsp. low-sugar cocktail sauce for dipping    c. whole wheat pasta drizzled with   tsp. olive oil   Supplement 1 cup skim/1% milk with scoop of protein powder (if needed between meals)   Dinner Grilled, seasoned kebob with 2 oz lean beef and   cup vegetables     Breakfast Breakfast pizza:   [place] Thin spread with 1 Tbsp. low sugar spaghetti sauce,   cup shredded low fat cheese, melted and 1 slice of [place] centeno     cup fresh fruit mixed with chopped almonds   Lunch   cup black bean soup  4-5 whole grain crackers   Dinner 3 oz  tilapia with lemon pepper seasoning    cup stewed tomatoes   Supplement 1 string cheese (if needed between meals)     Breakfast 2 hard boiled eggs (discard 1 egg yolk)    whole wheat English Muffin with 1 tsp. low sugar jelly   Lunch   cup leftover black bean soup topped with 1-2 Tbsp. low fat cheese  2-3 light [place] Krisp crackers   Supplement Approved protein supplement (if needed between meals)   Dinner 3 oz sirloin steak    cup steamed broccoli

## 2021-06-13 NOTE — PROGRESS NOTES
"Nydia Colindres is a 65 y.o. female who is being evaluated via a billable telephone visit.      The patient has been notified of following:     \"This telephone visit will be conducted via a call between you and your physician/provider. We have found that certain health care needs can be provided without the need for a physical exam.  This service lets us provide the care you need with a short phone conversation.  If a prescription is necessary we can send it directly to your pharmacy.  If lab work is needed we can place an order for that and you can then stop by our lab to have the test done at a later time.    Telephone visits are billed at different rates depending on your insurance coverage. During this emergency period, for some insurers they may be billed the same as an in-person visit.  Please reach out to your insurance provider with any questions.    If during the course of the call the physician/provider feels a telephone visit is not appropriate, you will not be charged for this service.\"    Patient has given verbal consent to a Telephone visit? Yes    What phone number would you like to be contacted at? [phone]    Patient would like to receive their AVS by AVS Preference: Dyan.    Phone call duration: 21 minutes    Gabriela Tejeda RD        Post-op Surgical Weight Loss Diet Evaluation     Assessment:  Pt presents for 18 months post-op RD visit, s/p RYGB on 6-5-19 with Dr. Fishman. Today we reviewed current eating habits and level of physical activity, and instructed on the changes that are required for successful bariatric outcomes.    Patient Progress: patient is very stressed and upset about her mother's health deteriorating, states she feels fine otherwise, not walking, has some pain with food. States she is struggling with water intake, is going to start adding flavoring to help intake.     Pt's Initial Weight: 286 lbs  Weight: 165 lb (74.8 kg) (no new weight)  Weight loss from initial: 121  % " Weight loss: 42.31 %      Body mass index is 30.18 kg/m .    Patient Active Problem List   Diagnosis     Uncontrolled type 2 diabetes mellitus without complication, without long-term current use of insulin     Dyslipidemia     Fibromyalgia     History of gastric bypass     Hypothyroidism, unspecified type     Insomnia     Olecranon bursitis of left elbow     Poorly controlled type 2 diabetes mellitus (H)     Depression     Fibromyalgia, primary     GERD (gastroesophageal reflux disease)     Diabetes mellitus (H)     Rheumatoid arthritis (H)     Elevated liver enzymes     Sleep apnea     Disease of thyroid gland     Urgency incontinence     Asthma     Hypertension     Metabolic syndrome     Morbid obesity (H)     Lumbar back pain     Foot pain     Diabetic neuropathy (H)     Screening-pulmonary TB     Encounter for long-term (current) use of high-risk medication     S/P gastric bypass     Dehydration     Nausea     Diarrhea     Luetscher's syndrome     Type 2 diabetes mellitus without complication, without long-term current use of insulin (H)     Intractable vomiting with nausea, unspecified vomiting type     Other dysphagia     Sicca (H)       Diabetes: yes    Vitamins   Multi Vit with Iron: yes  Calcium Citrate: yes  B12: yes  D3: yes    Nausea: no  Vomiting: only once every 2-3 months  Diarrhea: no  Constipation: no  Hair loss:no      Fluid Intake  Water: having a hard time getting water down- had started adding lemonade     Exercise: is not going on daily 2-3 walks due to cold weather      PES statement:      (NC-1.4) Altered GI Function related to Alteration in gastrointestinal tract structure and/or function/ Decreased functional length of the GI tract as evidenced by Weight loss of 42.3% initial body weight; Gastric bypass surgery    Intervention    Discussion  1. Discussed 18 months and beyond Post-Op Nutritional Guidelines for RYGB  2. Recommended to consume 15-20gm protein at 3 meals daily, along with  "protein supplement/\"planned protein containing snack\" of 15-30gm protein, to reach goal of 60-80 gm protein daily.  3. Educated on post-op vitamin regimen: Multi Vit + iron 2x/day, calcium citrate 400-600 mg 2x/day, 5951-5855 mcg of Sublingual B-12 daily, and 5000 IU Vitamin D3 daily (MVI and calcium can be taken at the same time BID)  4. Reviewed lean protein sources  5. Bariatric Plate Method-  including lean/low fat protein at each meal, including a vegetable/fruit, and limiting carbohydrate intake to less than 25% of plate volume.   Instructions  1. Include 15-20gm protein at each meal, along with protein supplement/\"planned protein containing snack\" of 15-30gm protein, to reach goal of 60-80 gm protein daily.  2. Increase fluid intake to 64oz daily: choose plain or calorie/carbonation-free beverages.  3. Incorporate daily structured activity, 30-60 minutes most days of the week  4. Recommended pt to start taking: Multi Vit + iron 2x/day, calcium citrate 400-600 mg 2x/day, 3616-9193 mcg of Sublingual B-12 daily, and 5000 IU Vitamin D3 daily. (MVI and calcium can be taken at the same time)  5. Read food labels more consistently: keeping total fat grams <10, total sugar grams <10, fiber >3gm per serving.  6. Increase vegetable/fruit intake, by having a vegetable or fruit with each meal daily.  7. Practice plate method: 1/2 plate lean/low fat protein source, vegetable/fruit, <25% of plate complex carbohydrates.  8. Separate fluids 30 minutes before/after meal times.  9. Practice eating off of smaller plates/bowls, chewing to applesauce consistency, taking 20-30 minutes to eat in a calm/relaxed environment without distractions of tv/email/cell phone.    Handouts provided:  18 months and beyond Post-Op Nutritional Guidelines for RYGB    Assessment/Plan:    Pt to follow up for 2 year  post-op visit with bariatrician     Gabriela Tejeda RD    "

## 2021-06-17 ENCOUNTER — COMMUNICATION - HEALTHEAST (OUTPATIENT)
Dept: SURGERY | Facility: CLINIC | Age: 66
End: 2021-06-17
Payer: COMMERCIAL

## 2021-06-17 NOTE — TELEPHONE ENCOUNTER
----- Message from Victorina Alonzo CMA sent at 5/11/2021  9:46 AM CDT -----  Regarding: INTERNAL LAB ORDERS  RNY in 2019 w/PK. Appt on 06/04/21 w/ Sund for annual. Pt will go to [place].    Victorina Alonzo CMA  [facility]  Surgery Clinic [facility] [place]  Weight Management Clinic [address] [address]  Office: [phone]  Fax: [phone]

## 2021-06-18 NOTE — PROGRESS NOTES
Outpatient Bariatric Medicine Consultation   Indication: Medical Bariatric Consultation to [facility]  Primary Provider: Bienvenido Colvin MD  Requesting Physician: Dr. Fishman  Type of Bariatric Surgery: Revision. She had a VBG and vomited a lot. She had a HH repair for GERD and still requires a PPI. Intolerance for meats, especially pork. She has been dilated 3X.      Impression    Nydia Colindres is a 63 y.o. year old female with  has a past medical history of Asthma; Depression; Diabetes mellitus (H) (2002); Diabetic neuropathy (H); Disease of thyroid gland; Elevated liver enzymes; Fibromyalgia, primary; Foot pain; GERD (gastroesophageal reflux disease); Hypertension; Lumbar back pain; Metabolic syndrome; Morbid obesity (H); Rheumatoid arthritis (H); Sleep apnea; and Urgency incontinence.,   poor functional capacity and musculoskeletal disability due to morbid obesity which satisfies NIH criteria for bariatric surgery. Her  Body mass index is 35.85 kg/(m^2)..  Nydia Colindres hopes to achieve improvement of her DM2 and weight loss following surgery and significant weight loss. She has had multiple dilations and food intolerances. GERD despite HH repair. She thinks she had a small MI a year ago. Her A1c was 10.4 May 2018.    Bariatric Recommendations   Bariatric therapy is indicated for Nydia as a means of modifying her obesity related co-morbidities of DM2, MICHEAL, HTN, dyslipidemia, dyspnea on exertion,   She has GERD and food intolerances and persistent DM2 with her VBG.   Revisional bariatric therapy is most likely to induce significant weight loss, promote long-term weight maintenance and lead to clinical improvement and/or resolution of her weight related co-morbidities.     Bariatric Surgery Requirements   Medical Nutrition Therapy including comprehensive evaluation, guidance and clearance is required.  Bariatric Psychological Assessment and clearance is required.  Bariatric Laboratory studies are  "indicated and were ordered today. Await C-peptide. If >2 she may be able to come off of her DM2 medications.  Routine Healthcare Maintenance must be current prior to bariatric surgery. Her HCM is UTD.  Colonoscopy: UTD  Mammogram: UTD  Pap: UTD  Support Group Attendance one time is required prior to surgery, encouraged thereafter.  Lifelong vitamin supplementation is required.  Lifelong follow up is indicated.  Physical Activity Plan is indicated, was discussed and will be reinforced each visit.  Non-Smoking status must be achieved a minimum of 60 days prior to surgery and she should remain a non-smoker indefinitely following bariatric surgery. She quit at age 29    Perioperative Recommendations   CARDIAC: Cardiac consultation and clearance will be required due to history of \"little heart attack\" one year ago which she did not follow up on despite being advised to.  PULMONARY: Pre-operative therapy with CPAP/BIPAP is indicated for a minimum of one month for patients with sleep apnea.She quit smoking at age 29. She has MICHEAL and hasn't used her CPAP because it broke. She will arrange her sleep study near her home.  RENAL: Diuretics will be discontinued 2 weeks before surgery at the time of liquid diet if the patient is on them at that time.  ENDOCRINE: Optimizing perioperative glycemic control is indicated. Our goal is an AIC of 8 or less at the time of surgery for optimal healing. If her C-peptide is +, she would be willing to use GLP-1 RA in lieu of her januvia to help with sugars and weight loss  GASTROINTESTINAL: Evaluation of the esophagus and stomach by EGD will be ordered d/t her previous weight loss surgery, GERD and food intolerance.  GYNECOLOGIC: For patients on Estrogen- Estrogen will be discontinued 4 weeks prior to surgery and resumed 4 weeks after surgery unless otherwise advised. Reliable contraception is required post-operatively for 1 year for women of childbearing age. DEPOT PROVERA is contraindicated " due to its association with weight gain. Post-operative birth control plan is PM  MUSCULOSKELETAL/RHEUMATOLOGIC: NSAIDS are contraindicated following surgery and lifelong abstinence is indicated. When indicated for cardioprotection or otherwise, patients should use an enteric coated ASA and concomitant proton pump inhibitor.  HEMATOLOGIC: Risks of deep venous thromboembolism have been assessed. Patients with history of DVT/PE or current anti-coagulation will be placed on the High Risk DVT Prophylaxis Protocol. Objections (if any) to receiving blood products if necessary have been documented. She will be on the Standard Protocol  DENTAL: Reasonable and functional dentition is indicated in order to chew food to applesauce consistency post-operatively.  NUTRITIONAL: Pre and post-operative nutritional and lifestyle modification guidance is indicated. Pre-operative weight reduction can reduce liver volume, improving technical aspects of surgery.    History Surrounding Consultation   Struggles with weight started at age childhood  Her weight at age 17 was 145#  She has had several past supervised and unsupervised weight loss attempts, WW and all other possible  The most weight lost was: 126#  Unfortunately there was not durable weight maintenance.  History of bulimia, anorexia, or binge eating disorder? no  If Present has eating disorder been in remission at least 3 years? NA  Night time eating? no    Dietary History   Meals per day: 6  Snacks: 3  Typical Snack: apple with PB, cheese,   Who does the grocery shopping? She does  Who does the cooking? She does  A typical meal includes: B: egg with 1/2 english muffin L: sandwich on WW with veggies D: 2 tacos corn tortillas  Regular Pop: no  Juice: no  Caffeine: 4c/d  Amount of restaurant eating per week: 3  Eating a the table with the TV off? yes    Physical Activity Pattern   Current physical activity routine includes: walks around the block most days-[place] in  [place]    Limitations from being physically active on a regular basis includes: just started a few weeks ago    She describes her general health as: good    Past Medical History     Past Medical History:   Diagnosis Date     Asthma      Depression      Diabetes mellitus (H) 2002     Diabetic neuropathy (H)      Disease of thyroid gland     hypothyroid     Elevated liver enzymes     ?d/t anti rheumatic injectables     Fibromyalgia, primary      Foot pain      GERD (gastroesophageal reflux disease)      Hypertension      Lumbar back pain      Metabolic syndrome      Morbid obesity (H)      Rheumatoid arthritis (H)     age 39     Sleep apnea     not using CPaP     Urgency incontinence      Dyslipidemia: yes  Obesity Hypoventilation: no  DM2: yes DM1: no DX: 2002 Most recent AIC: 10.4 May 2018  Neuropathy: yes  Nephropathy: no  Retinopathy: no  Glaucoma:no  IFG or \"pre-DM\": yes  MI: no  CVA:no  CHF: no  Heart Valves: no  Previous cardiac testing includes: EKG stress test  Cancers: no  Kidney Disease: no  Colitis: no  Crohn's: no  PUD: no  HX UGI/EGD:yes with dilations X3  Asthma: yes controlled  Back Pain:no  DDD: no  Gout: no  Severe Headaches: no  Seizures: no If so, last seizure: NA  Pseudotumor: no  PCOS: no  Menstrual Irregularity: NA  Menorrhagia: NA  Infertility: PM  Thyroid problems: yes  Thyroid medications: yes  Glaucoma: no  HIV positive: no  MRSA/VRE history: no  History of Blood transfusion: no  Anemia: no    Medications     Current Outpatient Prescriptions   Medication Sig Dispense Refill     albuterol (PROAIR HFA;PROVENTIL HFA;VENTOLIN HFA) 90 mcg/actuation inhaler Inhale 2 puffs daily as needed.       aspirin 81 MG EC tablet Take 1 tablet by mouth daily.       empagliflozin 25 mg Tab Take 25 mg by mouth daily.       gabapentin (NEURONTIN) 300 MG capsule Take 300 mg by mouth 3 (three) times a day.       levothyroxine (SYNTHROID, LEVOTHROID) 150 MCG tablet Take 150 mcg by mouth daily.       lisinopril " (PRINIVIL,ZESTRIL) 10 MG tablet Take 10 mg by mouth daily.       metFORMIN (GLUCOPHAGE-XR) 500 MG 24 hr tablet Take 1,000 mg by mouth 2 (two) times a day.       MULTIVITAMIN WITH IRON ORAL Take 1 tablet by mouth daily.       omeprazole 20 mg TbEC Take 20 mg by mouth daily.       pramipexole (MIRAPEX) 0.5 MG tablet Take 0.5 mg by mouth at bedtime.       pravastatin (PRAVACHOL) 40 MG tablet Take 1 tablet by mouth daily.       sitaGLIPtin (JANUVIA) 100 MG tablet Take 100 mg by mouth daily.       tocilizumab (ACTEMRA) 162 mg/0.9 mL Syrg subcutaneous injection Inject 162 mg under the skin once a week.       No current facility-administered medications for this visit.        Allergies    Metrizamide and Sulfa (sulfonamide antibiotics)    Past Surgical History     Past Surgical History:   Procedure Laterality Date     APPENDECTOMY       CHOLECYSTECTOMY       GASTROPLASTY VERTICAL BANDED  2004    Dr. Fishman     HIATAL HERNIA REPAIR      Dr. Fishman     MYRINGOTOMY       TYMPANOSTOMY TUBE PLACEMENT       History of problems with anesthesia: no  History of Malignant Hyperthermia: NO    Gynecologic History     Menarche:   Regular:   Currently: none  Problems getting pregnant: no  MD Involvement: no If so, explanation/Diagnosis: no  : 2  Para:   C-S: 0  Vaginal deliveries: 2  SAB:0  EAB: 0  Gestational DM: no  Gestational HTN: no  Preeclampsia: no  Current Birth Control: PM    Family History     family history includes Atrial fibrillation in her mother; Heart disease in her brother and father.    Social History     Social History     Social History     Marital status:      Spouse name: N/A     Number of children: N/A     Years of education: N/A     Occupational History     Not on file.     Social History Main Topics     Smoking status: Former Smoker     Packs/day: 3.00     Years: 14.00     Smokeless tobacco: Never Used      Comment: age 15 to 29 up to 3ppd     Alcohol use Yes      Comment: 1 drink per  "week     Drug use: No     Sexual activity: Yes     Partners: Male     Birth control/ protection: Post-menopausal     Other Topics Concern     Not on file     Social History Narrative     2 daughters 1 granddaughter    Retired. Worked for [employer]. Working at [employer] in [email or web address] 2-3d/wk       Psychiatric History   Diagnoses: anxiety and depression  Treated by: medications  Psychiatric Hospitalizations: no  Suicide attempts: no  ECT: no  Panic attacks: no  History of Abuse: no    Palliative Medicine History   Involvement in a pain clinic: no    Dental History   Missing teeth: no  Pending Dental Work: no  Regular Dental Visits: yes  Dentures/Partials: no    Review of Systems   Snoring: MICHEAL  Witnessed Apneas MICHEAL  PND MICHEAL  [place] Score: MICHEAL  STOP BANG: MICHEAL  General  Fatigue: yes  Sleep Quality:4 hours  HEENT  Visual changes: no  Cardiovascular  Murmur: no  Elevated BP: no  Chest Pain with Exertion: no  Dyspnea with Exertion: can go up steps without being winded  Palpitations: no  Lower Extremity Edema: no  Syncope: no  Pulmonary  Shortness of breath at rest: no  Wheezing: rare  CPAP use: no  Gastrointestinal  Trouble swallowing:yes,   Heartburn: yes  Abdominal pain: no  Hematochezia: no  Urologic  Hesitancy: no  Urgency: yes  USI sometimes  Genitourinary  ED: NA  Menorrhagia: NA  Dysmenorrhea: NA  Neurologic  Severe headache:no  Paresthesias: yes  Psychiatric  Moods Stable: yes  Hallucinations: no  Rheumatologic  Myalgias: yes  Arthralgias: yes  Endocrine  Polydipsia: yes  Polyuria: yes  Galactorrhea: no  Heat intolerance: no  Hirsutism: no  Musculoskeletal  Joint pain:yes  Falls: no  Use of cane, crutch or motorized scooter: no  Hematologic  Abnormal Bleeding or Clotting: no  Dermatologic  Skin Tags: yes  Striae: yes  Furuncless: no  Acne: no  Intertrigo: no  Lower Leg ulcers: no    Physical Exam   Vitals: /67  Pulse 74  Resp 18  Ht 5' 2\" (1.575 m)  Wt 196 lb (88.9 kg)  SpO2 100%  Breastfeeding? " "No  BMI 35.85 kg/m2  Height:   Ht Readings from Last 1 Encounters:   06/14/18 5' 2\" (1.575 m)     Weight:   Wt Readings from Last 1 Encounters:   06/14/18 196 lb (88.9 kg)     Height: 5' 2\" (1.575 m) (6/14/2018  9:03 AM)  Initial Weight: 286 lbs (Day of surgery) (6/14/2018  9:03 AM)  Weight: 196 lb (88.9 kg) (6/14/2018  9:03 AM)  Weight loss from initial: 90 (6/14/2018  9:03 AM)  % Weight loss: 31.47 % (6/14/2018  9:03 AM)  BMI (Calculated): 35.8 (6/14/2018  9:03 AM)  SpO2: 100 % (6/14/2018  9:03 AM)  Body mass index is 35.85 kg/(m^2).    General Appearance  No acute distress. Obesity: central  Alert: yes  Sleepy: no  Ambulatory without a device: yes  HEISAIAS MANN Melampati Score: R>L pupil subtle  Neck  Stout: 17\" No carotid bruits  Cardiovascular  Rhythm regular Rate Regular  Murmur: no  Pulmonary  [place] Score: MICHEAL  Lungs clear to ascultation  Abdomen  Soft, NT/ND No rebound, no guarding  No rashes.   Post surgical Scars: vertical  Extremities:  Pitting edema: no  Palpable distal pulses: 2+  Varicose veins: no  Neurologic  Tremors: no  Psychiatric  Thought Content Organized  Mood appears stable  Endocrine  Moon Facies: NO  Dorsal Thoracic Prominence: NO  Skin tags: yes  Acanthosis nigricans: no  Purple Striae: no  Dermatologic  Intertrigo: yes, calm    Counseling/Education   We reviewed the important post op bariatric recommendations:  -eating 3 meals daily  -eating protein first, getting >60gm protein daily 80gm if duodenal switch  -eating slowly, chewing food well  -avoiding/limiting calorie containing beverages  -drinking water 15-30 minutes before or after meals  -choosing wheat, not white with breads, crackers, pastas, juan, bagels, tortillas, rice  -limiting restaurant or cafeteria eating to twice a week or less    We discussed the importance of restorative sleep and stress management in maintaining a healthy weight.  We discussed the National Weight Control Registry healthy weight maintenance " strategies and ways to optimize metabolism.  We discussed the importance of physical activity including cardiovascular and strength training in maintaining a healthier weight.  We discussed the importance of lifelong vitamin supplementation and lifelong follow-up.    Nydia Colindres was reminded that, postoperatively,  to avoid marginal ulcers she should avoid tobacco at all, alcohol in excess, caffeine in excess, and NSAIDS (unless indicated for cardioprotection or othewise and opposed by a PPI).     She was reminded that after bariatric surgery alcohol will affect her differently and she should not drive after consuming even one alcoholic beverage.  Thank you for the opportunity to participate in the care of your patient.  Alise Irby MD, FAAFP    No images are attached to the encounter.    60 minutes spent with patient. >50% in counseling.

## 2021-06-19 NOTE — PROGRESS NOTES
Attended support group. Workflow updated.    Victorina Alonzo [facility]  P: [phone]  F: [phone]

## 2021-06-19 NOTE — PROGRESS NOTES
Health and Behavior Assessment with Intervention for  Bariatric Surgery Candidates    Name: Nydia Colindres   YOB: 1955  Dates of Service: 7/3/2018, 2018  Psychological Testin2018  Report Date: 2018    Height: 5 feet 2 inches Reported Weight: 196 pounds  BMI: 35.85  Anticipated Weight: Between 120 and 140 pounds    Identifying Data: This is a 63 y.o. ,  mother of 2 children and (ages 43 and 39). She was referred by Perry Fishman MD at [facility] to determine readiness for bariatric surgery from a psychosocial perspective. She learned about the surgery by originally going through the VBG with Dr. Fishman in   but is currently struggling with negative side effects.  She is looking to have a revision to a Lexi-en-Y.  Her daughter had vertical sleeve gastrectomy.    Reason for Pursuing Surgery: She would like to follow through with bariatric surgery for health reasons.  She is struggling with her blood sugars.    Diagnostic Impressions:  Principal Diagnosis: F 53 psychological factors affecting medical condition  Secondary diagnoses: Morbid obesity, high cholesterol, high blood pressure, rash, urinary incontinence, sleep apnea on CPAP, diabetes mellitus, heartburn, GERD, gallbladder removed, degenerative arthritis, restless leg syndrome, fibromyalgia and pain in her back and feet    Conclusions    Recommendations: Based on the information gathered from this evaluation, this patient is now ready to follow through with bariatric surgery as soon as possible. The final decision to follow through with bariatric surgery should be done in collaboration with [facility] clinical staff.    Current Treatment Plan and Aftercare Plan: This patient agrees to continue to prepare for surgery and to participate in aftercare as directed by [facility] clinical staff. This includes following up with this  "clinician 3-6 months post-operatively, attending the [facility] support group meeting and continuing to make the lifestyle and eating changes such as documenting her eating, measuring her food, planning out her meals and increasing activity level.    Special Needs or Precautions: Monitor this patient's ability to avoid mindless eating.      Compliance, Motivation and Expectations (Change in Behavior): This patient has made significant changes in her eating and lifestyle. She is highly motivated to continue to make these changes post-operatively. She has realistic expectations about the surgery.     Self-Perception of Readiness for Surgery: This patient rated her readiness for surgery at a 9, where 10 means \"extremely well prepared.\"    The following history supports the above conclusions and treatment recommendations.    History of Presenting Illness: This patient has struggled with her weight for much of her life.  In high school she was 140 pounds.  She reached 200 pounds 40 years ago.  She reached 300 pounds in 2004.  Her highest weight was 315 pounds. She believes morbid obesity has affected her daily life through getting up from the floor and low endurance.    Previous Attempts at Disease Management: This patient tried weight watchers, Rebeccasd Garcia, medifast and at the [facility] the last of which she lost between 115 and 120 pounds.  She believes she gained weight over time because she enjoyed food.    Self-Care Behaviors  Day and Night Routine: This patient goes to sleep at 11 PM and wakes up between 430 and 6:30 PM.  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  Otherwise she gardens, mows the lawn, rides her motorcycle and takes care of her  because he has a lot of physical complaints.    Boundaries and Limit Setting: This patient is a self-described caretaker  but can say no to others.    Self-Care and Treatment Adherence: This patient does not do a good job of taking care of " "herself.  Her hygiene is good, she complies with medical advice given to her and gets regular physical, gynecological, mammogram and dental exams.    Stress Management and Coping Mechanisms: This patient nando with stress by internalizing it.  She walks, rides her motorcycle but has a history of boredom eating.    Other Impulsive and Compulsive Tendencies  Gambling: Denied   Compulsive Spending: she has a history of spending money on target sales.  Credit Card Debt: Denied  Bankruptcy: Denied     Legal History: denied    Physical and Leisure Activities: This patient walks for 25 minutes 5 days per week and does yard work.    Substance Use  OTC and Prescription Medications:    This patient denied a history of medication abuse and reportedly takes her medications as directed.  Nicotine:    This patient started smoking cigarettes at age 12, was up to 3 packs per day and quit in 1984.  Alcohol:    This patient started drinking alcohol at age 15, and now may have 1 drink per week.  She denied a history of alcohol-related problems and understands the increased risk of intoxication following a bariatric surgical procedure.  Illicit Substances:   Denied    Typical Eating Pattern and Fluid Intake  Eating Disorder-Related Behavior: This patient denied a history of misusing diuretics or laxatives, of making herself vomit to lose weight, of starving herself, of over-exercising, or of taking illegal substances.  She has a history of using nicotine for weight control purposes.  She has a history of overeating, grazing and boredom eating.  Historically she tended to eat her first meal at 8 AM consisting of eggs with cheese, \"diet\" bread or English muffin with butter.  Lunch was between 12 and 1 PM consisting of leftovers, ham and turkey sandwich, vegetables and chips.  Later she would have fruit.  Dinner was between 5 and 6 PM consisting of vegetables, ham, burger, hot dog, chicken, fish and bread with butter.  Later she would " "have ice cream \"drumsticks.\"  She was having 4 cups of coffee, between 64 and 96 ounces of water, 8 ounces of 2% milk and an occasional V8 on a daily basis.    Since starting the health and behavior assessment she was eating breakfast at 8 AM consisting of eggs with cheese, half English muffin with butter and fruit.  Lunch was between 12 and 1 PM consisting of 2 pieces of 45-calorie bread, mayonnaise and 4 ounces of ham and turkey as well as vegetables.  Later she would have fruit.  Dinner was between 5 and 6 PM consisting of chicken, fish, vegetables and a glass of 2% milk.  Later she would have some chips.  She was having a minimal amount of V8, 3 cups of coffee, 8 ounces of 2% milk in between 64 and 96 ounces of water on a daily basis.  She tended to lose control of her eating when she was bored or at buffets and her comfort food included casserole, fried chicken, mashed potatoes and eggs.    Psychiatric History  Traumatic Life Events and Abuse/Neglect History: This patient denied a history of trauma as well as physical, emotional or sexual abuse.  She stated that she wants to lose weight but has a long ways to go.  She denied being put down or teased because of her physical condition.  She has been distressed about her  and her mother's health but denied any history of depression.  She denied anxiety, panic or obsessive-compulsive symptoms.  She denied being in psychotherapy.  She was on medication years ago but is no longer taking it.  She had a job transfer in which she drove long distances and had some distress and possible brief anxiety.    Medical History (by patient report and without consulting with her primary care physician). This patient is followed medically by Bienvenido Colvin MD at [facility] but it is unclear if she has discussed surgery with him.      Allergies/Sensitivities:    Sulfa, metrizamide, contrast dye  Prenatal Complications, Birth Trauma, Unusual Birth Weight:    Denied " "  Head Trauma, Concussion, Extremely High Fevers, Seizures:       denied  Thyroid Function: Reportedly normal.  Hospitalizations and Surgeries: VBG, gallbladder, tonsillectomy, spider bite, infection, normal labor and delivery twice  Current Medications:   Current Outpatient Prescriptions:      albuterol (PROAIR HFA;PROVENTIL HFA;VENTOLIN HFA) 90 mcg/actuation inhaler, Inhale 2 puffs daily as needed., Disp: , Rfl:      aspirin 81 MG EC tablet, Take 1 tablet by mouth daily., Disp: , Rfl:      gabapentin (NEURONTIN) 300 MG capsule, Take 300 mg by mouth 3 (three) times a day., Disp: , Rfl:      levothyroxine (SYNTHROID, LEVOTHROID) 150 MCG tablet, Take 150 mcg by mouth daily., Disp: , Rfl:      liraglutide (VICTOZA) 0.6 mg/0.1 mL (18 mg/3 mL) PnIj injection, Inject 1.8 mg under the skin daily., Disp: 27 mL, Rfl: prn     lisinopril (PRINIVIL,ZESTRIL) 10 MG tablet, Take 10 mg by mouth daily., Disp: , Rfl:      metFORMIN (GLUCOPHAGE-XR) 500 MG 24 hr tablet, Take 1,000 mg by mouth 2 (two) times a day., Disp: , Rfl:      MULTIVITAMIN WITH IRON ORAL, Take 1 tablet by mouth daily., Disp: , Rfl:      omeprazole 20 mg TbEC, Take 20 mg by mouth daily., Disp: , Rfl:      pen needle, diabetic 32 gauge x 5/32\" Ndle, Use a new pen needle for each injection., Disp: 90 each, Rfl: prn     pramipexole (MIRAPEX) 0.5 MG tablet, Take 0.5 mg by mouth at bedtime., Disp: , Rfl:      pravastatin (PRAVACHOL) 40 MG tablet, Take 1 tablet by mouth daily., Disp: , Rfl:      sitaGLIPtin (JANUVIA) 100 MG tablet, Take 100 mg by mouth daily., Disp: , Rfl:      tocilizumab (ACTEMRA) 162 mg/0.9 mL Syrg subcutaneous injection, Inject 162 mg under the skin once a week., Disp: , Rfl:   Vitamins/Supplements:   Multivitamin and biotin  Activities of Daily Living (ADLs):   This patient denied any difficulty meeting her hygiene needs.  Attitudes, Fears, or Concerns Regarding this Surgery: This patient denied any concerns about the surgery and is excited about " "it.    Family History  This patient has a family history that is positive for obesity, heart disease, stroke, diabetes mellitus, sleep apnea, arthritis, cancer, depression and anxiety.    Social and Developmental History:  This patient was born and raised in [place], [place] and [place], [place].  Her mother (89) is still living and her father  in  at age 70.  She has a younger brother with whom she does not have a good relationship.  She stated during her upbringing \"I had the best childhood I could have had.\"  She stated that her brother was mean to her.    Educational History: This patient graduated from [employer] in .  She received a certification to be a .    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  Current Living Situation, Partner, and Children: This patient was  in  and is happy in the relationship \"but he has been grouchy.\"  She has 2 daughters (43 and 39) who are supportive of her.  Her support includes her coworkers, friend,  and children.  Jain Orientation/Spiritual Support: [employer]ian   History: Denied  Two Year Goals: This patient would like to have her blood sugars normal.    Psychological Testing: The Alcohol Use Disorders Identification Test (AUDIT) is a measure to determine how alcohol affects overall functioning and treatment. Her score was 2 which is at a subclinical level suggesting that alcohol likely will not affect her functioning in the least.      The [place] Multiphasic Personality Mjfojxmei-0-Ocmafzzpofmc Form (MMPI-2-RF) was responded to in an open and honest manner and the profile is valid and interpretable.  Individuals with profiles similar to hers tend to have somatic based complaints and worry about their health.  They may be cynical and somewhat distrustful of others.  They may be impulsive " and somewhat verbally aggressive towards others.  They tend to be opinionated.    The [facility] Behavioral Medicine Diagnostic (MBMD) was responded to in an open and honest manner and the profile is valid and interpretable.  Individuals with profiles similar to hers tend to be cooperative with clinical staff.  They likely will not abuse medications, they are spiritual and likely medically compliant.    The Quality of Life Inventory (QOLI) is a measure to determine overall satisfaction with life. Her overall Quality of Life T-Score was in the average all within normal limits range.  In fact she was not dissatisfied with any aspect of her life.    The Eating Inventory is a measure to determine ability to follow through a treatment program as well as restrict and control eating even during times of stress and emotion. Her Cognitive Restraint of Eating, Disinhibition and Hunger scores are all within normal limits suggesting that she has adequate knowledge of nutrition information and would not lose control of her eating when under stress.    Mental Status: This patient came to the evaluation setting dressed casually, although appropriately. She was generally cooperative and responded appropriately to this clinician's questions. Her affect was generally bright and Her mood was consist with her affect. There is no evidence of obsessions, compulsions, suicidal ideation or homicidal ideation. There is no evidence of hallucinations, delusions, paranoid ideation, grossly inappropriate affect or other cyndi manifestation of psychotic disorder. She was oriented to person, place and time, and there is no evidence of any problems with impulse control.

## 2021-06-19 NOTE — LETTER
Letter by Kasia Goldberg RN at      Author: Kasia Goldberg RN Service: -- Author Type: --    Filed:  Encounter Date: 7/22/2019 Status: (Other)         July 22, 2019     Patient: Nydia Colindres   YOB: 1955   Date of Visit: 7/22/2019       To Whom It May Concern:    It is my medical opinion that Nydia Colindres may return to full duty immediately with no restrictions.    If you have any questions or concerns, please don't hesitate to call.  She was cleared to return to work per her surgeon Dr. Perry Fishman.    Sincerely,         Kasia Goldberg RN

## 2021-06-19 NOTE — PROGRESS NOTES
Workflow updated with Endocrinology visit.     Nicki Núñez RN, N  [facility]  P [phone]  F [phone]

## 2021-06-19 NOTE — PROGRESS NOTES
Health and Behavior Assessment with Intervention, Initial (60 minutes): Met with patient 1:1 to obtain demographics and background information, reasons for surgery, typical eating pattern/fluid intake as well as psychiatric history. Nydia Caal is a 63-year-old ,  female who would like to follow through with Lexi-en-Y for health reasons.  She currently has a VBG but she feels that something happened to it and will need to have it removed.  She has not always been compliant with diabetes medications.  She is an independent thinker questions treatments at times.  For example she explains that she does not wear a seatbelt because it is constraining and does not wear helmets because sweat gets in her eyes when she rides her motorcycle.  She has good knowledge of the surgery and good support.  She has a history of boredom eating.  She will follow-up and complete psychological testing.  Diagnoses: F 53; E 66.01

## 2021-06-19 NOTE — PROGRESS NOTES
HPI: Nydia Caal is a 63-year-old woman who is referred in by .  She is status post an open vertical banded gastroplasty in 2004.  At that time she weighed 286 pounds.  She had type 2 diabetes.  She was able to lose a large amount of weight.  In 2006 I repaired an incisional hernia using Kugel mesh.  She did quite well but her diabetes returned around 2009 and she was started on metformin.  Now her diabetes is becoming increasingly difficult to control.  She is now on Januvia and Victoza as well as the Metformin.  Her A1c has been running in the 10 range.  She has a C-peptide of 3.3.  Dr. Irby has been managing her diabetes which seems to be under better control at this point.  Patient states she actually feels better with her blood sugars a little bit in the higher range as opposed to the normal range.  She also has significant rheumatoid arthritis for which she receives an injection of tocilizumab once a week.  She has gastroesophageal reflux disease for which she uses omeprazole.  She recently underwent a sleep study which confirmed again that she has obstructive sleep apnea so she is using her CPAP.  She is interested in having her VBG converted to Lexi-en-Y gastric bypass.  Her weight is actually been stable and she has been fairly comfortable at her current weight of 186 pounds with a BMI of 35.  She would be interested in losing some more weight but her main concern at this point is her poorly controlled diabetes mellitus and she would like to have metabolic surgery to get this under better control.    CURRENT MEDS:  Current Outpatient Prescriptions   Medication Sig Dispense Refill     aspirin 81 MG EC tablet Take 1 tablet by mouth daily.       empagliflozin 25 mg Tab Take 25 mg by mouth daily.       gabapentin (NEURONTIN) 300 MG capsule Take 300 mg by mouth 3 (three) times a day.       levothyroxine (SYNTHROID, LEVOTHROID) 150 MCG tablet Take 150 mcg by mouth daily.       liraglutide (VICTOZA) 0.6 mg/0.1  "mL (18 mg/3 mL) PnIj injection Inject 1.8 mg under the skin daily. 27 mL prn     lisinopril (PRINIVIL,ZESTRIL) 10 MG tablet Take 10 mg by mouth daily.       melatonin 5 mg Subl Place 1 tablet under the tongue at bedtime.       metFORMIN (GLUCOPHAGE-XR) 500 MG 24 hr tablet Take 1,000 mg by mouth 2 (two) times a day.       MULTIVITAMIN WITH IRON ORAL Take 1 tablet by mouth daily.       omeprazole 20 mg TbEC Take 20 mg by mouth daily.       pen needle, diabetic 32 gauge x 5/32\" Ndle Use a new pen needle for each injection. 90 each prn     pravastatin (PRAVACHOL) 40 MG tablet Take 1 tablet by mouth daily.       sitaGLIPtin (JANUVIA) 100 MG tablet Take 100 mg by mouth daily.       tocilizumab (ACTEMRA) 162 mg/0.9 mL Syrg subcutaneous injection Inject 162 mg under the skin once a week.       albuterol (PROAIR HFA;PROVENTIL HFA;VENTOLIN HFA) 90 mcg/actuation inhaler Inhale 2 puffs daily as needed.       No current facility-administered medications for this visit.          /74  Pulse 91  Temp 97.8  F (36.6  C)  Resp 16  Ht 5' 2\" (1.575 m)  Wt 193 lb (87.5 kg)  BMI 35.3 kg/m2  Wt Readings from Last 1 Encounters:   07/31/18 193 lb (87.5 kg)     Body mass index is 35.3 kg/(m^2).    EXAM:  GENERAL: Appears well  ABDOMEN: She has a well-healed midline incision and a right subcostal incision.  No hernias are appreciated though you can palpate the mesh a little bit.      Assessment/Plan: Patient with previous VBG and hernia repair with mesh.  She now has recurrence of her diabetes which is becoming recently difficult to control.  She is interested in having a revision of her VBG to Lexi-en-Y gastric bypass.  I feel she is a great candidate for this and highly recommend converting her from her VBG to Lexi-en-Y gastric bypass.  I discussed this with her and that revisional surgery is always more difficult than the first time around.  I discussed the potential scar tissue as a result of her hernia repair and some of the " increased difficulties with converting her to a Lexi-en-Y gastric bypass just because the previous surgeries.  I would like to get an upper GI  to better evaluate her anatomy.  She still needs to complete some of her prerequisites for the program around the dietary portion.  I did go over converting her VBG to a Lexi-en-Y gastric bypass and described this to both her and her  as well as with diagrams.  We will plan on proceeding with prior authorization.  I will need to see her back prior to surgery to go over the details of the operation and some of the risk factors involved.  I spent 40 minutes with the patient outside of the exam and counseling.  Counseling was a majority of this visit.     Perry Fishman MD  [facility] Department of Surgery

## 2021-06-19 NOTE — PROGRESS NOTES
Initial Structured Weight Loss Supervised Diet Evaluation     Assessment:  Pt. is a 63 y.o. female is being seen today for initial RD nutritional evaluation. Pt. has been unsuccessful with non-surgical weight loss methods and is interested in bariatric surgery. Today we reviewed current eating habits and level of physical activity, and instructed on the changes that are required for successful bariatric outcomes.    Pt desires having bariatric revision surgery from VBG (s/p 2004) to RNY. Pt currently struggles with not feeling full and also is resistant to use insulin for DM. Pt would like to be 150 lb and lower blood sugar. Pt's , present at visit, is also pursuing bariatric surgery. Pt works part time at a [employer] store and likes to go motorcycling with .     Workflow review: Has not attended support group or info seminar, psych f/u needed + testing, labs complete.   Weight goal: at or below initial      Pt Active Problem List Diagnosis:     Patient Active Problem List   Diagnosis     Uncontrolled type 2 diabetes mellitus without complication, without long-term current use of insulin (H)     Dyslipidemia     Fibromyalgia     History of gastric bypass     Hypothyroidism, unspecified type     Insomnia     Olecranon bursitis of left elbow     Poorly controlled type 2 diabetes mellitus (H)     Depression     Fibromyalgia, primary     GERD (gastroesophageal reflux disease)     Diabetes mellitus (H)     Rheumatoid arthritis (H)     Elevated liver enzymes     Sleep apnea     Disease of thyroid gland     Urgency incontinence     Asthma     Hypertension     Metabolic syndrome     Morbid obesity (H)     Lumbar back pain     Foot pain     Diabetic neuropathy (H)       Pt's Initial Weight: 286 lbs  Weight: 197 lb 8 oz (89.6 kg)  Weight loss from initial: 88.5  % Weight loss: 30.94 %  BMI: Body mass index is 36.12 kg/(m^2).    Estimated RMR (Bedford-St Jeor equation): 1401 calories    Food allergies,  intolerances, and [place]ism customs: allergy to tree nuts.     Diabetes  Testing Blood Sugars: yes   If so, how often? Periodically   Last A1C, (per pt. report): 10%  DM Meds currently taking: yes     Vitamins   Educated on post-op vitamin regimen: Multi Vit + iron 2x/day, calcium citrate 500-600 mg 2x/day, 8334-5768 mcg of Sublingual B-12 daily, and 5000 IU Vitamin D3 daily.    Biggest struggle with weight loss: large portions     Diet/Weight History  Method or Diet: weight watchers   # loss(-) or gain(+):118 lb lowest weight     Who does the grocery shopping for your household? Pt   Who prepares your meals at home? Pt     Diet Recall/Time: Pt wakes up at 6-7am   Breakfast: 8am- 1 egg, 1/2 english muffin with butter, 1 glass 2% milk (15g)   Am Snack: fruit   Lunch: lunch meat and cheese sandwich, vegetable, fruit (28g)   Pm snack: fruit   Dinner: 8 oz steak, 2 cups broccoli, pasta salad, cheesecake (60 oz)   HS Snack: cookie or ice cream cone     Per Diet recall estimated protein:  grams    Fats used at home: butter, olive oil.     Fried Foods: 1 times per week    Meals per week away from home: 1x/week   Sit down: 2 breakfast a week   Fast Food: periodically    Take Out: fried chicken  Delivery: none   Buffet: none   Cafeteria: none   Specialty Coffee: none   Ice Cream/FrozenYogurt/Bakery: none   Comments: none     Recommended limiting eating out to no more than 2x/week.  Patient and I reviewed the importance of eating three consistent meals per day; as well as meal timing to be spaced 4-5 hours apart.  Snack choices: 100-150 calories (1-2x/day if physically hungry), incorporating a fruit/vegetable w/ protein source.    Portion Sizes problematic? Yes per patient/diet recall  Encouraged slowing meal times down, 20-30 minutes, chewing to applesauce consistency.   To aid in proper portion control and slow meal time down discussed consuming meals off smaller plates, use toddler/children utensils and set utensils  down after each bite.    Protein, vegetables/fruits, carbohydrates:   Reviewed lean protein sources today. Recommended consuming 15-20gm protein at 3 meals daily    Beverages (Type/Oz. per day)  Water: 60-70 oz   Coffee: 1 cup   Tea: none   Milk: 1-2 glasses 2%   Regular soda: none   Diet soda: none   Juice: none   Raul-Aid/lemonade/etc: none   Alcohol: 1 drink/week    Discussed the importance of adequate hydration after surgery and the goal of 64+ oz. of fluid daily.   The patient understands the importance of avoiding all carbonated, caffeinated and sweetened drinks; instead choosing 64 oz. plain water.    Fluid-meal separation:  The patient and I reviewed the anatomy of the bypass and why  fluids from a meal is so important.    Exercise  Pt goes walking daily. Pt on her feet for work.     Pt. s understands that 30-60 minutes of daily activity is an important part of bariatric surgery success.   Encouraged pt. to incorporate upper body strength training exercise, even if its lifting soup cans while watching TV at night, doing pushups/sit-ups, and abdominal work.    PES statement:   1. (NI-1.3)Excessive energy intake related to Food and nutrition related knowledge deficit concerning excessive energy/oral intake as evidenced by Intake of high caloric density foods at meals and/or snacks; large portions; frequent grazing; Estimated intake that exceeds estimated daily energy intake; BMI 36.        Intervention  Discussion:    1. Educated pt on the [place] to Bariatric Success handout.  2. Reviewed lean protein sources today. Recommended consuming 15-20gm protein at 3 meals daily  3. Discussed protein supplements to have post bariatric surgery   4. Gave food journal homework, to be completed for f/u appointment.  5. Bariatric Plate: The patient and I discussed the importance of including lean/low  fat protein at each meal and limiting carbohydrate intake to less  than 25% of plate volume.    Instructions/Goals:      1. Include 15-20gm lean protein at each meal.  2. Increase vegetable/fruit intake, by having a vegetable or fruit with each meal daily. Recommended pt to increase vegetable/fruit intake to 4-5 servings daily.  3. Increase fluid intake to 64oz daily: choose plain or calorie/carbonation-free beverages.  4. Incorporate daily structured activity, 30-60 minutes most days of the week  5. Fill out food journal and bring to next month's visit.  6. Practice plate method: 1/2 plate lean/low fat protein source, vegetable/fruit, <25% of plate complex carbohydrates.  7. Read food labels more consistently: keeping total fat grams <10, total sugar grams <10, fiber >3gm per serving.  8. Separate fluids 30 minutes before/after meal times.  9. Practice eating off of smaller plates/bowls, chewing to applesauce consistency, taking 20-30 minutes to eat in a calm/relaxed environment without distractions of tv/email/cell phone.    Handouts Provided:  Pt. [facility]ariat[facility] Patient Handbook  Bariatric Plate  Food journal  Protein supplement list        Monitor/Evaluation:  Pt. s target weight: no gain from initial visit, pt. verbalized understanding.     Has realistic expectations for weight loss: Yes  Verbalizes understanding of dietary changes post procedure: No  Verbalizes understanding of supplement needs: No  Verbalizes willingness to participate in physical activity: Yes  Motivation for change: average  Client s predicted compliance on a scale of 1 (low) to 10 (high): 7  RD s prediction for client success and compliance on a scale of 1 (low) to 10 (high):  7    Plan for next visit:   Review Bariatric plate and food journal homework.  Educate on dumping syndrome and reading food labels.  (Final Supervised Diet visit with RD) pre/post-op  diet progression, give review of surgery process.  Review [place] to Bariatric Success  Check Understanding Quiz    Time In: 9:00am  Time Out: 9:55am    ABN signed: Yes

## 2021-06-20 NOTE — PROGRESS NOTES
I have submitted a prior authorization request on behalf of this patient to have a conversion of VBG to LRNY with Dr. Perry Fishman.  Copy to [place] to scan to patients chart

## 2021-06-20 NOTE — PROGRESS NOTES
Follow Up Surgical Weight Loss Supervised Diet Evaluation  Assessment:  Pt presents for follow up supervised diet visit with CHRISSIE.    This patient is a 63 y.o.  She is being seen today for follow-up nutritional evaluation. Nydia Colindres has been unsuccessful with non-surgical weight loss methods and is interested in bariatric surgery. Today we reviewed the patients current eating habits and level of physical activity, and instructed on the changes that are required for successful bariatric outcomes.    Pt has history of bariatric surgery.  Seeking conversion from VBG s/p 2004 to RNY.     Workflow review: labs complete, psych complete, attended support group.    [place]- pt reports being in physical pain and having headaches from medication.   Weight goal: at or below initial      Pt Active Problem List Diagnosis:     Patient Active Problem List   Diagnosis     Uncontrolled type 2 diabetes mellitus without complication, without long-term current use of insulin (H)     Dyslipidemia     Fibromyalgia     History of gastric bypass     Hypothyroidism, unspecified type     Insomnia     Olecranon bursitis of left elbow     Poorly controlled type 2 diabetes mellitus (H)     Depression     Fibromyalgia, primary     GERD (gastroesophageal reflux disease)     Diabetes mellitus (H)     Rheumatoid arthritis (H)     Elevated liver enzymes     Sleep apnea     Disease of thyroid gland     Urgency incontinence     Asthma     Hypertension     Metabolic syndrome     Morbid obesity (H)     Lumbar back pain     Foot pain     Diabetic neuropathy (H)     Pt's Initial Weight: 286 lbs  Weight: 194 lb (88 kg)  Weight loss from initial: 92  % Weight loss: 32.17 %  Body mass index is 35.48 kg/(m^2).    Calculated RMR (Patrick-St Jeor equation): 1401 calories    Progress made since last visit: Pt reports cutting down on sweets and feels her diet has been otherwise consistent. Pt brought at completed food journal.       Diabetes  Testing Blood  Sugars: Yes   Medications: Taking     Diet Recall/Time:   Breakfast: 1 egg, cheese, vegetables, 1/2 english muffin, peach (15g)   Am Snack: none   Lunch: chicken breast sandwich (20g)   Pm snack:none   Dinner: 3 oz meat, vegetables, 1 cup rice (20g)   HS Snack: cookie, milk (8g)     Protein: 60-70 grams    Meals per week away from home: none      Recommended limiting eating out to no more than 2x/week.  Patient and I reviewed the importance of eating three consistent meals per day; as well as meal timing to be spaced 4-5 hours apart.  Snack choices: 100-150 calories (1-2x/day if physically hungry), incorporating a fruit/vegetable w/ protein source.    Meal Duration:30 minutes  Encouraged slowing meal times down, 20-30 minutes, chewing to applesauce consistency.     Portion Sizes problematic? No Per patient/diet recall   To aid in proper portion control and slow meal time down discussed consuming meals off smaller plates, use toddler/children utensils and set utensils down after each bite.    Protein, vegetables/fruits, carbohydrates:   The patient and I discussed the importance of including lean/low fat protein at each meal and limiting carbohydrate intake to less than 25% of plate volume.     Vitamins   Post-op vitamin regimen: Multi Vit + iron 2x/day, calcium citrate 500-600 mg 2x/day, 3653-0612 mcg of Sublingual B-12 daily, and 5000 IU Vitamin D3 daily.    Beverages (Type/Oz. per day)  Water: 64 oz   Coffee: 3 cups a day   Tea: none   Milk: 1 glass   Regular soda: none   Diet soda: none   Juice: none   Raul-Aid/lemonade/etc: none   Alcohol: none     Discussed the importance of adequate hydration after surgery and the goal of 64+ oz of fluid daily.   The patient understands the importance of avoiding all carbonated, caffeinated and sweetened drinks; and instead choose 64 oz plain water.    Fluid-meal separation:   Pt is working on  fluids 30min before and 30 minutes after meals.  Fluids are   30min before and 30 minutes after meals.    Exercise  Walking.     Pt's understands that 30-60 minutes of daily activity is an important part of bariatric surgery success.   Encouraged pt to incorporate upper body strength training exercise, even if its lifting soup cans while watching TV at night, doing pushups/sit-ups, and abdominal work.    PES statement:   1.  (NI-1.3)Excessive energy intake related to Food and nutrition related knowledge deficit concerning excessive energy/oral intake as evidenced by Intake of high caloric density foods at meals and/or snacks; large portion;Estimated intake that exceeds estimated daily energy intake;  and BMI 35.       Intervention:  Discussion:   1. Reviewed the [place] to Bariatric Success handout.  2. Reviewed lean protein sources and recommended to consume 15-20gm protein at 3 meals daily.  3. Gave pt Check Your Understanding Quiz, and assessed readiness for change.  4. Discussed protein supplements to use  5. Reviewed how to read a food label   6. Educated on pre/post-op diet progression, post-op vitamin regimen, gave review of surgery process.  7. Dumping syndrome: choosing foods with less than 5-10gm fat/sugar per serving to avoid dumping syndrome for RNY pts.  8. Reviewed Bariatric plate and food journal homework.    Instructions/Goals:   1. Include 15-20gm protein at each meal.  2. Increase vegetable/fruit intake, by having a vegetable or fruit with each meal daily. Recommended pt to increase vegetable/fruit intake to 4-5 servings daily.  3. Increase fluid intake to 64oz daily: choose plain or calorie/carbonation-free beverages.  4. Incorporate daily structured activity, 30-60 minutes most days of the week  5. Practice plate method: 1/2 plate lean/low fat protein source, vegetable/fruit, <25% of plate complex carbohydrates.  6. Read food labels more consistently: keeping total fat grams <10, total sugar grams <10, fiber >3gm per serving.  7. Separate fluids 30 minutes  before/after meal times.  8. Practice eating off of smaller plates/bowls, chewing to applesauce consistency, taking 20-30 minutes to eat in a calm/relaxed environment without distractions of tv/email/cell phone.    Handouts provided:  Pt. [facility] Bariatric Care Patient Handbook    Monitor/Evaluation:     Pt understands the importance of not gaining any weight from initial recorded weight.      Has realistic expectations for weight loss: Yes  Verbalizes understanding of dietary changes post procedure: Yes  Verbalizes understanding of supplement needs: Yes  Verbalizes willingness to participate in physical activity: Yes  Motivation for change: average  Client s predicted compliance on a scale of 1 (low) to 10 (high): 7  RD s prediction for client success and compliance on a scale of 1 (low) to 10 (high):  7    Pt has made the necessary changes, and is knowledgeable and well-informed of the dietary and physical activity requirements that are necessary for successful bariatric outcomes. This Pt is an appropriate candidate for surgery from a nutrition standpoint at this time. The patient understands that surgery is a tool, and not a cure, and our aftercare program must be followed.    Time In: 8:30am  Time Out: 9:00am      ABN signed: Yes

## 2021-06-21 NOTE — PROGRESS NOTES
"HPI: Ms. Colindres is here for follow-up of consideration of converting her previous VBG to a Lexi-en-Y gastric bypass.  She remains about the same.  She describes episodes of emesis and some abdominal discomfort.  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  There seems to be a fairly significant stenosis at the band area.  She states that she is interested in proceeding with revisional surgery.    CURRENT MEDS:  Current Outpatient Prescriptions   Medication Sig Dispense Refill     albuterol (PROAIR HFA;PROVENTIL HFA;VENTOLIN HFA) 90 mcg/actuation inhaler Inhale 2 puffs daily as needed.       aspirin 81 MG EC tablet Take 1 tablet by mouth daily.       empagliflozin 25 mg Tab Take 25 mg by mouth daily.       gabapentin (NEURONTIN) 300 MG capsule Take 300 mg by mouth 3 (three) times a day.       levothyroxine (SYNTHROID, LEVOTHROID) 150 MCG tablet Take 150 mcg by mouth daily.       liraglutide (VICTOZA) 0.6 mg/0.1 mL (18 mg/3 mL) PnIj injection Inject 1.8 mg under the skin daily. 27 mL prn     lisinopril (PRINIVIL,ZESTRIL) 10 MG tablet Take 10 mg by mouth daily.       melatonin 5 mg Subl Place 1 tablet under the tongue at bedtime.       metFORMIN (GLUCOPHAGE-XR) 500 MG 24 hr tablet Take 1,000 mg by mouth 2 (two) times a day.       MULTIVITAMIN WITH IRON ORAL Take 1 tablet by mouth daily.       omeprazole 20 mg TbEC Take 20 mg by mouth daily.       pen needle, diabetic 32 gauge x 5/32\" Ndle Use a new pen needle for each injection. 90 each prn     pilocarpine (SALAGEN) 5 MG tablet Take 5 mg by mouth daily.       pravastatin (PRAVACHOL) 40 MG tablet Take 1 tablet by mouth daily.       sitaGLIPtin (JANUVIA) 100 MG tablet Take 100 mg by mouth daily.       tocilizumab (ACTEMRA) 162 mg/0.9 mL Syrg subcutaneous injection Inject 162 mg under the skin once a week.       No current facility-administered medications for this " "visit.          /72  Pulse 84  Resp 18  Ht 5' 2\" (1.575 m)  Wt 190 lb (86.2 kg)  SpO2 100%  Breastfeeding? No  BMI 34.75 kg/m2  Wt Readings from Last 1 Encounters:   11/06/18 190 lb (86.2 kg)     Body mass index is 34.75 kg/(m^2).    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  She is also having difficulty with dysphasia.  I have recommended converting her previous vertical banded gastroplasty to a Lexi-en-Y gastric bypass.  She is also had placement of mesh for ventral hernia repair which could complicate the surgery itself.  I went over the surgery with her and discussed in detail that this is revisional surgery and that this carries higher risk particularly around bleeding and staple line leak.  We reviewed her options including the potential of converting this to either a sleeve gastrectomy or Lexi-en-Y gastric bypass but settled on the Lexi-en-Y gastric bypass.  We also discussed not doing surgery but she is not interested in that.  I went over the surgery in detail with her.  I discussed the fact that given her previous open surgery and previous ventral hernia repair that I may be dealing with quite a bit of scar tissue and an increase of the risk of having to convert to an open operation.  I also went over the potential complications of the operation include but not limited to DVT, pulmonary embolism, pneumonia, bleeding, wound infection, staple line leak with potential intra-abdominal sepsis.  Patient understands and wishes to proceed with surgery.  I spent 30 minutes with the patient outside of the exam and counseling.  Counseling was majority of this visit.     Perry Fishman MD  [facility] Department of Surgery  "

## 2021-06-21 NOTE — PROGRESS NOTES
I have submitted a prior authorization request on behalf of this patient to Erna SCHAEFER to be approved for a conversion of her VBG to a LRNY with Dr. Perry Fishman
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
## 2021-06-26 NOTE — ANESTHESIA CARE TRANSFER NOTE
Last vitals:   Vitals:    06/09/21 1300   BP: (P) 122/58   Pulse: (P) 78   Resp: (P) 18   Temp: (P) 36.3  C (97.3  F)   SpO2: (P) 100%     Patient's level of consciousness is drowsy  Spontaneous respirations: yes  Maintains airway independently: yes  Dentition unchanged: yes  Oropharynx: oropharynx clear of all foreign objects    QCDR Measures:  ASA# 20 - Surgical Safety Checklist: WHO surgical safety checklist completed prior to induction    PQRS# 430 - Adult PONV Prevention: 4558F - Pt received => 2 anti-emetic agents (different classes) preop & intraop  ASA# 8 - Peds PONV Prevention: NA - Not pediatric patient, not GA or 2 or more risk factors NOT present  PQRS# 424 - Lynne-op Temp Management: 4559F - At least one body temp DOCUMENTED => 35.5C or 95.9F within required timeframe  PQRS# 426 - PACU Transfer Protocol: - Transfer of care checklist used  ASA# 14 - Acute Post-op Pain: ASA14B - Patient did NOT experience pain >= 7 out of 10

## 2021-06-26 NOTE — PROGRESS NOTES
"Nydia Colindres is a 66 y.o. female who is being evaluated via a billable telephone visit.      The patient has been notified of following:     \"This telephone visit will be conducted via a call between you and your physician/provider. We have found that certain health care needs can be provided without the need for a physical exam.  This service lets us provide the care you need with a short phone conversation.  If a prescription is necessary we can send it directly to your pharmacy.  If lab work is needed we can place an order for that and you can then stop by our lab to have the test done at a later time.    Telephone visits are billed at different rates depending on your insurance coverage. During this emergency period, for some insurers they may be billed the same as an in-person visit.  Please reach out to your insurance provider with any questions.    If during the course of the call the physician/provider feels a telephone visit is not appropriate, you will not be charged for this service.\"    Patient has given verbal consent to a Telephone visit? Yes    What phone number would you like to be contacted at? [phone]    Patient would like to receive their AVS by AVS Preference: Mail a copy.      Phone call duration: 13 minutes    Gabriela Tejeda RD          Post-op Surgical Weight Loss Diet Evaluation     Assessment:  Pt presents for 2 years post-op RD visit, s/p RYGB on 6-5-19 with Dr. Fishman. Today we reviewed current eating habits and level of physical activity, and instructed on the changes that are required for successful bariatric outcomes.    Patient Progress: had a EGD yesterday- will start taking omperazole twice a day  Was vomiting and had trouble eating.     +states she isn't sure she needs this appointment today as she now knows what is going on with her, we quickly chatted about diet for ulcers and ways to boost protein    Pt's Initial Weight: 286 lbs  Weight: 160 lb (72.6 kg)  Weight loss from " "initial: 126  % Weight loss: 44.06 %  Weight (Patient Reported): 160 lb (72.6 kg)  Height (Patient Reported): 5' 2\" (1.575 m)  BMI (Based on Pt Reported Ht/Wt): 29.26      There is no height or weight on file to calculate BMI.    Patient Active Problem List   Diagnosis     Uncontrolled type 2 diabetes mellitus without complication, without long-term current use of insulin     Dyslipidemia     Fibromyalgia     History of gastric bypass     Hypothyroidism, unspecified type     Insomnia     Olecranon bursitis of left elbow     Poorly controlled type 2 diabetes mellitus (H)     Depression     Fibromyalgia, primary     GERD (gastroesophageal reflux disease)     Diabetes mellitus (H)     Rheumatoid arthritis (H)     Elevated liver enzymes     Sleep apnea     Disease of thyroid gland     Urgency incontinence     Asthma     Hypertension     Metabolic syndrome     Morbid obesity (H)     Lumbar back pain     Foot pain     Diabetic neuropathy (H)     Screening-pulmonary TB     Encounter for long-term (current) use of high-risk medication     S/P gastric bypass     Dehydration     Nausea     Diarrhea     Luetscher's syndrome     Type 2 diabetes mellitus without complication, without long-term current use of insulin (H)     Intractable vomiting with nausea, unspecified vomiting type     Other dysphagia     Sicca (H)     Vomiting     History of bariatric surgery       Diabetes: yes    Vitamins   Multi Vit with Iron: yes  Calcium Citrate: yes  B12: yes  D3: yes    Nausea: yes  Vomiting: yes    +has been focusing on protein sources such as yogurt      PES statement:      (NC-1.4) Altered GI Function related to Alteration in gastrointestinal tract structure and/or function/ Decreased functional length of the GI tract as evidenced by Gastric bypass surgery  (NI-5.7.1) Inadequate protein intake related to Gastric bypass causing increased nutrient needs due to malabsorption/ Decreased ability to consume sufficient protein as evidenced " "by Edema, poor musculature, dull skin, thin and fragile hair; and Estimated intake of protein insufficient to meet requirements    Intervention    Discussion  1. Recommended to consume 15-20gm protein at 3 meals daily, along with protein supplement/\"planned protein containing snack\" of 15-30gm protein, to reach goal of 60-80 gm protein daily.  2. Reviewed lean protein sources and protein drink choices- encouraged her to try clear protein drinks like Protein 2-0 or premier protein clear      Gabriela Tejeda RD    "

## 2021-06-26 NOTE — ANESTHESIA PREPROCEDURE EVALUATION
Anesthesia Evaluation        Airway   Mallampati: II  Neck ROM: full   Pulmonary - normal exam   (+) asthma  sleep apnea, a smoker                         Cardiovascular - normal exam  (+) hypertension, ,      Neuro/Psych    (+) neuromuscular disease,      Endo/Other    (+) diabetes mellitus, hypothyroidism, arthritis, obesity,      GI/Hepatic/Renal    (+) GERD,             Dental - normal exam                        Anesthesia Plan  Planned anesthetic: MAC    ASA 3   Induction: intravenous   Anesthetic plan and risks discussed with: patient    Post-op plan: routine recovery

## 2021-06-26 NOTE — PROGRESS NOTES
I am scheduling Kenna for a EGD with Dr. Zapata on 06/09/21 @ [facility].  She is a former revision patient of Dr. Fishman's with vomiting and nausea.  She understands that she needs a  and be NPO for 8 hours prior to her check in one hour before the procedure.  She is being scheduled for 2:30 pm.  I have let her know that someone will call her the day before to get her checked in and give her the time to be there

## 2021-06-26 NOTE — ANESTHESIA POSTPROCEDURE EVALUATION
Patient: Nydia Colindres  Procedure(s):  ESOPHAGOGASTRODUODENOSCOPY (EGD)  Anesthesia type: MAC    Patient location: PACU  Last vitals:   Vitals Value Taken Time   /58 06/09/21 1301   Temp 36.3  C (97.3  F) 06/09/21 1300   Pulse 87 06/09/21 1306   Resp 18 06/09/21 1300   SpO2 100 % 06/09/21 1306   Vitals shown include unvalidated device data.  Post vital signs: stable  Level of consciousness: awake and responds to simple questions  Post-anesthesia pain: pain controlled  Post-anesthesia nausea and vomiting: no  Pulmonary: unassisted, return to baseline  Cardiovascular: stable and blood pressure at baseline  Hydration: adequate  Anesthetic events: no    QCDR Measures:  ASA# 11 - Lynne-op Cardiac Arrest: ASA11B - Patient did NOT experience unanticipated cardiac arrest  ASA# 12 - Lynne-op Mortality Rate: ASA12B - Patient did NOT die  ASA# 13 - PACU Re-Intubation Rate: ASA13B - Patient did NOT require a new airway mgmt  ASA# 10 - Composite Anes Safety: ASA10A - No serious adverse event    Additional Notes:

## 2021-07-02 DIAGNOSIS — E03.9 HYPOTHYROIDISM, UNSPECIFIED TYPE: ICD-10-CM

## 2021-07-02 RX ORDER — LEVOTHYROXINE SODIUM 175 UG/1
175 TABLET ORAL DAILY
Qty: 90 TABLET | Refills: 0 | Status: SHIPPED | OUTPATIENT
Start: 2021-07-02 | End: 2021-10-18

## 2021-07-03 NOTE — ADDENDUM NOTE
Addendum Note by Chantale Chauhan MD at 6/5/2019  1:31 PM     Author: Chantale Chauhan MD Service: -- Author Type: Physician    Filed: 6/5/2019  1:31 PM Date of Service: 6/5/2019  1:31 PM Status: Signed    : Chantale Chauhan MD (Physician)       Addendum  created 06/05/19 1331 by Chantale Chauhan MD    Order list changed, Order sets accessed

## 2021-07-03 NOTE — ADDENDUM NOTE
Addendum Note by Polina Branham RN at 6/11/2019  2:04 PM     Author: Polina Branham RN Service: -- Author Type: Registered Nurse    Filed: 6/11/2019  2:04 PM Encounter Date: 6/11/2019 Status: Signed    : Polina Branham RN (Registered Nurse)    Addended by: POLINA BRANHAM on: 6/11/2019 02:04 PM        Modules accepted: Orders

## 2021-07-03 NOTE — ADDENDUM NOTE
Addendum Note by Nicki Tom, RN at 5/20/2019  1:00 PM     Author: Nicki Tom RN Service: -- Author Type: Registered Nurse    Filed: 5/29/2019  9:31 AM Encounter Date: 5/20/2019 Status: Signed    : Nicki Tom RN (Registered Nurse)    Addended by: NICKI TOM on: 5/29/2019 09:31 AM        Modules accepted: Orders

## 2021-07-06 VITALS — WEIGHT: 160 LBS | BODY MASS INDEX: 29.26 KG/M2 | HEIGHT: 62 IN | BODY MASS INDEX: 29.26 KG/M2

## 2021-07-21 DIAGNOSIS — I10 ESSENTIAL HYPERTENSION WITH GOAL BLOOD PRESSURE LESS THAN 140/90: ICD-10-CM

## 2021-07-21 NOTE — LETTER
July 28, 2021      Kenna Colindres  [address] [place] [id number]            Your provider has sent a 30 day leslie refill of lisinopril (ZESTRIL) 10 MG tablet. You are due for an appointment for further refills. Please contact the clinic to schedule an appointment for further refills.      Sincerely,       [facility] / ELOINA

## 2021-07-22 NOTE — TELEPHONE ENCOUNTER
Routing refill request to provider for review/approval because:  BP    BP Readings from Last 3 Encounters:   09/16/19 104/70   06/12/19 123/76   05/24/19 126/74              Pending Prescriptions:                       Disp   Refills    lisinopril (ZESTRIL) 10 MG tablet [Pharmac*90 tab*0        Sig: TAKE 1 TABLET BY MOUTH ONCE DAILY        Rolando Galvan RN

## 2021-07-23 RX ORDER — LISINOPRIL 10 MG/1
TABLET ORAL
Qty: 30 TABLET | Refills: 0 | Status: SHIPPED | OUTPATIENT
Start: 2021-07-23 | End: 2021-09-10

## 2021-07-26 NOTE — TELEPHONE ENCOUNTER
Left a message for Kenna to call the clinic to schedule an appointment. Please help the patient schedule for Medication an appointment.  Jeannine Mcdonald-  Talisha

## 2021-08-03 ENCOUNTER — VIRTUAL VISIT (OUTPATIENT)
Dept: FAMILY MEDICINE | Facility: CLINIC | Age: 66
End: 2021-08-03
Payer: COMMERCIAL

## 2021-08-03 DIAGNOSIS — I10 HTN, GOAL BELOW 140/90: Primary | ICD-10-CM

## 2021-08-03 DIAGNOSIS — E03.9 ACQUIRED HYPOTHYROIDISM: ICD-10-CM

## 2021-08-03 DIAGNOSIS — E11.65 TYPE 2 DIABETES MELLITUS WITH HYPERGLYCEMIA, WITHOUT LONG-TERM CURRENT USE OF INSULIN (H): ICD-10-CM

## 2021-08-03 PROCEDURE — 99213 OFFICE O/P EST LOW 20 MIN: CPT | Mod: 95 | Performed by: FAMILY MEDICINE

## 2021-08-03 RX ORDER — TIZANIDINE 2 MG/1
TABLET ORAL
COMMUNITY
Start: 2020-12-10

## 2021-08-03 RX ORDER — PRAMIPEXOLE DIHYDROCHLORIDE 0.12 MG/1
TABLET ORAL
COMMUNITY
Start: 2021-05-18

## 2021-08-03 NOTE — PROGRESS NOTES
Kenna is a 66 year old who is being evaluated via a billable telephone visit.    What phone number would you like to be contacted at? [phone]  How would you like to obtain your AVS? Mail a copy    Assessment & Plan       Red jak 65 yo F with dm2, hypothyroidism, htn, arthritis, hpld, fibromyalgia, gerd, sleep apnea, asthma, RA, calling to ask about her medications.    DOES NOT WANT TO TALK ABOUT DIABETES    HTN, goal below 140/90  Blood pressure been well controlled on lisinopril, but states she feels dizzy on the medicine.  Recommended holding the medication for 1 month and recheck blood pressure if normal then will discontinue the medication.    Acquired hypothyroidism  Did discuss at length hypothyroidism and fact that we need to check blood levels for TSH to know whether is well controlled.  She did you see a rheumatologist and blood was taken then but TSH was not checked and I did explain to her that if she was not being seen for the same issue there is no reason to check it at.  She has enough and she plans to continue taking the medication.    - TSH with free T4 reflex; Future   :[id number]}     Return in about 3 weeks (around 8/24/2021) for Routine Visit.    Perry Kumar MD  [facility] ANTHONY Pierson is a 66 year old who presents for the following health issues.    HPI     Wondering if she really needs the medications that were prescribed; Lisinopril and Levothyroxine    Diabetes Follow-up\    She says does not want to hear about diabetes anymore otherwise will hang phone     How often are you checking your blood sugar? Not at all    What concerns do you have today about your diabetes? None     Do you have any of these symptoms? (Select all that apply)  No numbness or tingling in feet.  No redness, sores or blisters on feet.  No complaints of excessive thirst.  No reports of blurry vision.  No significant changes to weight.    Have you had a diabetic eye exam in  the last 12 months? Yes- Date of last eye exam: 5/2021,  Location: [facility]   BP Readings from Last 2 Encounters:   09/16/19 104/70   06/12/19 123/76     Hemoglobin A1C (%)   Date Value   06/02/2021 9.1 (H)   06/18/2020 8.7 (H)   05/09/2019 6.9 (H)   12/07/2018 6.9 (H)     LDL Cholesterol Calculated (mg/dL)   Date Value   06/18/2020 168 (H)   05/09/2019 102 (H)   10/29/2018 96     Hypertension Follow-up    Do you check your blood pressure regularly outside of the clinic? No     Are you following a low salt diet? No no added salt     Are your blood pressures ever more than 140 on the top number (systolic) OR more   than 90 on the bottom number (diastolic), for example 140/90? No    Hypothyroidism Follow-up    Since last visit, patient describes the following symptoms: dry skin, constipation and loose stools      How many servings of fruits and vegetables do you eat daily?  0-1    On average, how many sweetened beverages do you drink each day (Examples: soda, juice, sweet tea, etc.  Do NOT count diet or artificially sweetened beverages)?   1    How many days per week do you exercise enough to make your heart beat faster? 7    How many minutes a day do you exercise enough to make your heart beat faster? 20 - 29    How many days per week do you miss taking your medication? 0      Objective       There were no vitals taken for this visit.      Vitals:  No vitals were obtained today due to virtual visit.    Phone call duration: 22 minutes

## 2021-08-17 DIAGNOSIS — K28.9 ANASTOMOTIC ULCER: ICD-10-CM

## 2021-08-17 DIAGNOSIS — R11.2 NAUSEA AND VOMITING, INTRACTABILITY OF VOMITING NOT SPECIFIED, UNSPECIFIED VOMITING TYPE: Primary | ICD-10-CM

## 2021-08-17 RX ORDER — SUCRALFATE 1 G/1
1 TABLET ORAL 4 TIMES DAILY
Qty: 360 TABLET | Refills: 0 | Status: SHIPPED | OUTPATIENT
Start: 2021-08-17 | End: 2021-11-15

## 2021-08-17 RX ORDER — ONDANSETRON 4 MG/1
4 TABLET, ORALLY DISINTEGRATING ORAL EVERY 8 HOURS PRN
Qty: 60 TABLET | Refills: 1 | Status: SHIPPED | OUTPATIENT
Start: 2021-08-17 | End: 2021-09-29

## 2021-08-27 ENCOUNTER — ANCILLARY PROCEDURE (OUTPATIENT)
Dept: MAMMOGRAPHY | Facility: CLINIC | Age: 66
End: 2021-08-27
Payer: COMMERCIAL

## 2021-08-27 DIAGNOSIS — Z12.31 VISIT FOR SCREENING MAMMOGRAM: ICD-10-CM

## 2021-08-27 PROCEDURE — 77067 SCR MAMMO BI INCL CAD: CPT | Mod: TC | Performed by: RADIOLOGY

## 2021-08-31 ENCOUNTER — NURSE TRIAGE (OUTPATIENT)
Dept: FAMILY MEDICINE | Facility: CLINIC | Age: 66
End: 2021-08-31

## 2021-08-31 NOTE — TELEPHONE ENCOUNTER
Patient has been off of Levothyroxine about 6 weeks. Her B/P is running high at 148/74 for the last week. Should she restart it? She needs a prescription sent to [facility] for 90 days. Ok to leave a detailed message.

## 2021-09-01 NOTE — TELEPHONE ENCOUNTER
Reason for Disposition    Chest pain lasting longer than 5 minutes and ANY of the following:* Over 45 years old* Over 30 years old and at least one cardiac risk factor (e.g., diabetes, high blood pressure, high cholesterol, smoker, or strong family history of heart disease)* History of heart disease (i.e., angina, heart attack, heart failure, bypass surgery, takes nitroglycerin)* Pain is crushing, pressure-like, or heavy    Additional Information    Negative: Shock suspected (e.g., cold/pale/clammy skin, too weak to stand, low BP, rapid pulse)    Negative: Difficult to awaken or acting confused (e.g., disoriented, slurred speech)    Negative: Passed out (i.e., fainted, collapsed and was not responding)    Negative: Severe difficulty breathing (e.g., struggling for each breath, speaks in single words)    Protocols used: CHEST PAIN-A-OH

## 2021-09-01 NOTE — TELEPHONE ENCOUNTER
Left message on answering machine for patient to call back to the nurse at [phone].    Pt had TSH ordered on 8/3/21. Does pt plan to do lab only for this?    Levothyroxine was sent on 7/2/21 for a 90 day supply. Did she get this rx?  Why has she been off of med for 6 weeks?    Is pt holding her lisinopril?    Shanna Berry RN  [facility]

## 2021-09-01 NOTE — TELEPHONE ENCOUNTER
Spoke with pt. She has been taking her levothyroxine. The medication she has not been taking for 6 weeks is the Lisinopril. Has been having pain in the middle of her chest x 2 weeks. Comes and goes. Has had quite a few nights where the pain rating has been an 8/10. Never had this prior to going off the medication. Has shortness of breath with walking. Has a heavy heart and chest pain are the worst of the symptoms. Shortness of breath doesn't happen often, but does happen. With the dizziness this is once and awhile, not all of the time. Is mostly when getting up fast. Declined to go to ER. States she will not sit there in one place for 4-5 hours.    Pt is requesting script for Lisinopril and is requesting a 90 day supply. Call back to the home number as the mobile number is a track phone and has to pay for her minutes.     Shanna Berry RN  [facility]

## 2021-09-07 DIAGNOSIS — K21.9 GASTROESOPHAGEAL REFLUX DISEASE WITHOUT ESOPHAGITIS: Primary | ICD-10-CM

## 2021-09-07 NOTE — TELEPHONE ENCOUNTER
Patient requested to get back on her Lisinopril over a week ago. She needs a 90 day supply for insur. Purposes. Ok to leave a detailed message.

## 2021-09-07 NOTE — TELEPHONE ENCOUNTER
Pt calling wanting to know statues of medication request. Not taker informed pt that it is at provider desk to be signed off. Pt would like notification when medication is sent to pharmacy.  Jeannine Mcdonald-  Talisha

## 2021-09-10 DIAGNOSIS — I10 ESSENTIAL HYPERTENSION WITH GOAL BLOOD PRESSURE LESS THAN 140/90: ICD-10-CM

## 2021-09-10 RX ORDER — LISINOPRIL 10 MG/1
10 TABLET ORAL DAILY
Qty: 90 TABLET | Refills: 1 | Status: SHIPPED | OUTPATIENT
Start: 2021-09-10 | End: 2022-03-25

## 2021-09-10 NOTE — TELEPHONE ENCOUNTER
Spoke with pt. Advised that script for Lisinopril was sent to pharmacy.    Shanna Berry RN  [facility]

## 2021-09-28 ENCOUNTER — TELEPHONE (OUTPATIENT)
Dept: SURGERY | Facility: CLINIC | Age: 66
End: 2021-09-28

## 2021-09-28 NOTE — TELEPHONE ENCOUNTER
Sent:8/15/2021 5:40 PM CDT   To:Alise Irby MD   Subject:Updates about my health     This is Stalin Colindres contacting you about Nydia Caal. She still can not get food down and has a real hard time even getting her pills down. She has tried everything you told her to do and it does not help. If it is the allosaur that she has we have to do something about it. Can you please call her or maybe make a appointment so she dominick come in an see you, but something has to be done. I know it is about Nydia but it is also making it hard on me when she can not eat anything with out being sick and I would like very much to eat with her and be able to eat something that she can. Please reach out to her [phone]   thank you   Stalin Colindres     this is Kenna.  Well I have been eating those pills like crazy.  Do they help, yes sometimes.  I still have problems.  I am almost out of those pills.  There are times I am out and about and I feel like I am not going to make it home.  My chest and my arms and hands are in so much pain, I wish this would happen to you and then you would understand what I am going through.  I still have no code from you so I could have my own my chart since talking to someone is out of the question.  Where did customer service go,  completely out of the window. If I sound angry, I am.  Not going to go to the hospital and lie there for the standard 3 hours.  [email or web address]

## 2021-09-28 NOTE — TELEPHONE ENCOUNTER
"Called the patient and she is very frustrated with the pain she continues to have.  When clarifying what medication she has been taking \"like crazy\" it is likely the carafate.  She will continue to get pain with most foods, but she is able to tolerated fish, yogurt and \"crunchy stuff\".  She feels like she has not been listened to about the pain she is having and that everyone just keeps telling her it is an ulcer.  She described pain in her chest and going down her arm when she is doing activity like walking the dog and it makes her nervous to continue that activity.  She has also continued with her Omeprazole.  When she described the pain to her PCP in her chest, he said it sounds like she possibly had a heart attack or some blockage, but now she doesn't see him anymore.  Encouraged her to establish care with a new primary care and discuss the need for a possible cardiac workup to cover all the basis.  I also told her that ulcers can take a while to heal and the importance of avoiding any possible irritants (which she admits to avoiding) along with the importance of taking the Omeprazole and carafate consistently. I confirmed that epigastric pain could be related to the ulcer even though she didn't want to hear that.  Her biggest thing is that she feels like she isn't heard and asked to be seen by the surgeon instead of the bariatrician at this time.  Patient scheduled with Dr. Mann tomorrow.  Kasia Goldberg RN  "

## 2021-09-29 ENCOUNTER — OFFICE VISIT (OUTPATIENT)
Dept: SURGERY | Facility: CLINIC | Age: 66
End: 2021-09-29
Payer: COMMERCIAL

## 2021-09-29 VITALS — SYSTOLIC BLOOD PRESSURE: 118 MMHG | BODY MASS INDEX: 30.73 KG/M2 | WEIGHT: 168 LBS | DIASTOLIC BLOOD PRESSURE: 66 MMHG

## 2021-09-29 DIAGNOSIS — R07.9 ACUTE CHEST PAIN: Primary | ICD-10-CM

## 2021-09-29 PROCEDURE — 99214 OFFICE O/P EST MOD 30 MIN: CPT | Performed by: SURGERY

## 2021-09-29 RX ORDER — PEDIATRIC MULTIPLE VITAMINS W/ IRON CHEW TAB 18 MG 18 MG
1 CHEW TAB ORAL
COMMUNITY

## 2021-09-29 RX ORDER — GABAPENTIN 600 MG/1
TABLET ORAL
COMMUNITY
Start: 2021-09-29

## 2021-09-29 NOTE — PROGRESS NOTES
GENERAL SURGICAL CONSULTATION    I was requested by Bienvenido Colvin to consult on this pt to evaluate them for chest pain.    HPI:  This is a 66 year old female here today with follow up from a Lap Band in 2005 and then a Convertion to a RNY GBP in 2019 with .  She had struggled with vomiting after eating.  Often this vomiting goes on for hours.  She feels that it is a crap shoot if food is going to cause vomiting or not.      She is also complaining of chest pain that radiates down her Left arm.  She often gets this pain when she walks the dog.  The pain starts a pressure in the chest and then the pain radiates right down the Left arm. This is happening 5 times a week.    Allergies:Metrizamide, Nsaids, Sulfa drugs, Contrast dye, and Diatrizoate    Past Medical History:   Diagnosis Date     Arthritis      Asthma      Depression      Diabetes mellitus (H)      Diabetes mellitus (H) 2002     Diabetic neuropathy (H)      Disease of thyroid gland     hypothyroid     Elevated liver enzymes     ?d/t anti rheumatic injectables     Fibromyalgia      Fibromyalgia, primary      Foot pain      GERD (gastroesophageal reflux disease)      Hyperlipidemia      Hypertension      Hypertension      Lumbar back pain      Metabolic syndrome      Morbid obesity (H)      MICHEAL (obstructive sleep apnea)      Rheumatoid arthritis (H)     age 39     Seasonal allergies      Sleep apnea     CPAP     Thyroid disease      Urgency incontinence        Past Surgical History:   Procedure Laterality Date     APPENDECTOMY       CHOLECYSTECTOMY       CHOLECYSTECTOMY       DILATION AND CURETTAGE  11/21/13    PMB, cervical polyp     ENT SURGERY      tonsils X 2     GASTROPLASTY N/A 6/5/2019    Procedure: LAPAROSCOPIC REVISION OF VERTICAL BANDED GASTROPLASTY TO JOSH EN Y GASTRIC BYPASS;  Surgeon: Perry Fishman MD;  Location: [facility];  Service: General     GASTROPLASTY VERTICAL BANDED  11/22/2004    Dr. Fishman     GI SURGERY      lap  band     HERNIA REPAIR       HIATAL HERNIA REPAIR  2005    Dr. Fishman     MYRINGOTOMY       SC ESOPHAGOGASTRODUODENOSCOPY TRANSORAL DIAGNOSTIC N/A 8/27/2019    Procedure: UPPER ENDOSCOPY;  Surgeon: Wilber Mann MD;  Location: [facility];  Service: General     SC ESOPHAGOGASTRODUODENOSCOPY TRANSORAL DIAGNOSTIC N/A 6/9/2021    Procedure: ESOPHAGOGASTRODUODENOSCOPY (EGD);  Surgeon: Chi Zapata MD;  Location: [facility];  Service: General     SOFT TISSUE SURGERY      multiple infections     TYMPANOSTOMY TUBE PLACEMENT         CURRENT MEDS:  Current Outpatient Medications   Medication Sig Dispense Refill     gabapentin (NEURONTIN) 600 MG tablet 2 TABLETS ONE TO  TWO HOURS BEFORE BEDTIME.       levothyroxine (SYNTHROID/LEVOTHROID) 175 MCG tablet Take 1 tablet (175 mcg) by mouth daily +++NEED RECHECK+++ 90 tablet 0     lisinopril (ZESTRIL) 10 MG tablet Take 1 tablet (10 mg) by mouth daily 90 tablet 1     Multiple Vitamin (MULTIVITAMINS PO) Take by mouth daily       omeprazole (PRILOSEC OTC) 20 MG tablet Take 20 mg by mouth daily       omeprazole (PRILOSEC) 20 MG DR capsule TAKE ONE CAPSULE BY MOUTH TWICE DAILY BEFORE MEALS  60 capsule prn     Pediatric Multivitamins-Iron (ANIMAL SHAPES/IRON) 18 MG CHEW Take 1 tablet by mouth       pilocarpine (SALAGEN) 5 MG tablet Take 5 mg by mouth       pramipexole (MIRAPEX) 0.125 MG tablet 1-2 TABS 1-2 HOURS BEFORE HS.       sucralfate (CARAFATE) 1 GM tablet Take 1 tablet (1 g) by mouth 4 times daily 360 tablet 0     tiZANidine (ZANAFLEX) 2 MG tablet 1-2 TABS 1-2 HOURS BEFORE HS.       tofacitinib (XELJANZ) 5 MG tablet 1/d with supper for 2 weeks and then 1 bid with food.         Family History   Problem Relation Age of Onset     Pulmonary Embolism Mother         89     Heart Disease Father      Asthma Father      Asthma Daughter      Myocardial Infarction Brother         age 50     Atrial fibrillation Mother      Heart Disease Brother      Family history is not  pertinent to this patients Chief Complaint.     reports that she quit smoking about 37 years ago. She has a 42.00 pack-year smoking history. She has never used smokeless tobacco. She reports previous alcohol use. She reports that she does not use drugs.    Review of Systems -   10 point Review of systems is negative except for; as mentioned above in HPI and PMHx    /66 (BP Location: Right arm, Patient Position: Sitting)   Wt 76.2 kg (168 lb)   BMI 30.73 kg/m    Body mass index is 30.73 kg/m .    EXAM:  GENERAL: Well developed female  HEENT: EOMI, Anicteric Sclera, Moist Mucous Membranes,  In Mouth the pt does not have redness or bleeding gums  CARDIOVASCULAR: RRR w/out murmur   CHEST/LUNG: Clear to Auscultation  ABDOMEN:  Non tender to palpation, +BS  MUSCULOSKELETAL:  No deformities with good range of motion in all extremities  NEURO: She is ambulatory with good strength in both legs.  HEME/LYMPH: No Cervical Adenopathy or tenderness.     IMAGES:  No recent images.  She has an Upper GI from 2018    XR UPPER GI W KUB WO AIR CONTRAST W DIGITAL RECORDING  8/6/2018 9:59 AM     INDICATION: Previous vertical banded gastroplasty. Bleeding more than previously could. Considering revision to mervin y.  COMPARISON: None.     FINDINGS:      ESOPHAGUS: Normal flow of contrast through the esophagus. No mass or stricture. During the time of exam, no gastroesophageal reflux seen.     STOMACH and SMALL BOWEL: Postsurgical changes vertical banded gastroplasty. Gastric pouch present, mildly larger than expected to be seen, with blind-ending elongated inferior extension adjacent to the lesser curvature stomach. Contrast flows freely   through the pouch into the distal stomach and proximal small bowel. No evidence of stricture. No leak of contrast. Right upper quadrant cholecystectomy clips.    Assessment/Plan:  My biggest concern at this point I am most concerned about this chest pain that sounds very much like Angina.  I want  her Heart worked up ASAP.        Once we have some clarity about her heart then we can start working on her hyper-emesis issues.        Wilber Mann MD  [facility]  [phone]

## 2021-09-29 NOTE — LETTER
9/29/2021         RE: Kenna Colindres  [address] [place] [address]        Dear Colleague,    Thank you for referring your patient, Kenna Colindres, to the [facility] [place]. Please see a copy of my visit note below.    GENERAL SURGICAL CONSULTATION    I was requested by Bienvenido Colvin to consult on this pt to evaluate them for chest pain.    HPI:  This is a 66 year old female here today with follow up from a Lap Band in 2005 and then a Convertion to a RNY GBP in 2019 with .  She had struggled with vomiting after eating.  Often this vomiting goes on for hours.  She feels that it is a crap shoot if food is going to cause vomiting or not.      She is also complaining of chest pain that radiates down her Left arm.  She often gets this pain when she walks the dog.  The pain starts a pressure in the chest and then the pain radiates right down the Left arm. This is happening 5 times a week.    Allergies:Metrizamide, Nsaids, Sulfa drugs, Contrast dye, and Diatrizoate    Past Medical History:   Diagnosis Date     Arthritis      Asthma      Depression      Diabetes mellitus (H)      Diabetes mellitus (H) 2002     Diabetic neuropathy (H)      Disease of thyroid gland     hypothyroid     Elevated liver enzymes     ?d/t anti rheumatic injectables     Fibromyalgia      Fibromyalgia, primary      Foot pain      GERD (gastroesophageal reflux disease)      Hyperlipidemia      Hypertension      Hypertension      Lumbar back pain      Metabolic syndrome      Morbid obesity (H)      MICHEAL (obstructive sleep apnea)      Rheumatoid arthritis (H)     age 39     Seasonal allergies      Sleep apnea     CPAP     Thyroid disease      Urgency incontinence        Past Surgical History:   Procedure Laterality Date     APPENDECTOMY       CHOLECYSTECTOMY       CHOLECYSTECTOMY       DILATION AND CURETTAGE  11/21/13    PMB, cervical polyp     ENT SURGERY      tonsils X 2     GASTROPLASTY N/A  6/5/2019    Procedure: LAPAROSCOPIC REVISION OF VERTICAL BANDED GASTROPLASTY TO JOSH EN Y GASTRIC BYPASS;  Surgeon: Perry Fishman MD;  Location: [facility];  Service: General     GASTROPLASTY VERTICAL BANDED  11/22/2004    Dr. Fishman     GI SURGERY      lap band     HERNIA REPAIR       HIATAL HERNIA REPAIR  2005    Dr. Fishman     MYRINGOTOMY       AL ESOPHAGOGASTRODUODENOSCOPY TRANSORAL DIAGNOSTIC N/A 8/27/2019    Procedure: UPPER ENDOSCOPY;  Surgeon: Wilber Mann MD;  Location: [facility];  Service: General     AL ESOPHAGOGASTRODUODENOSCOPY TRANSORAL DIAGNOSTIC N/A 6/9/2021    Procedure: ESOPHAGOGASTRODUODENOSCOPY (EGD);  Surgeon: Chi Zapata MD;  Location: [facility];  Service: General     SOFT TISSUE SURGERY      multiple infections     TYMPANOSTOMY TUBE PLACEMENT         CURRENT MEDS:  Current Outpatient Medications   Medication Sig Dispense Refill     gabapentin (NEURONTIN) 600 MG tablet 2 TABLETS ONE TO  TWO HOURS BEFORE BEDTIME.       levothyroxine (SYNTHROID/LEVOTHROID) 175 MCG tablet Take 1 tablet (175 mcg) by mouth daily +++NEED RECHECK+++ 90 tablet 0     lisinopril (ZESTRIL) 10 MG tablet Take 1 tablet (10 mg) by mouth daily 90 tablet 1     Multiple Vitamin (MULTIVITAMINS PO) Take by mouth daily       omeprazole (PRILOSEC OTC) 20 MG tablet Take 20 mg by mouth daily       omeprazole (PRILOSEC) 20 MG DR capsule TAKE ONE CAPSULE BY MOUTH TWICE DAILY BEFORE MEALS  60 capsule prn     Pediatric Multivitamins-Iron (ANIMAL SHAPES/IRON) 18 MG CHEW Take 1 tablet by mouth       pilocarpine (SALAGEN) 5 MG tablet Take 5 mg by mouth       pramipexole (MIRAPEX) 0.125 MG tablet 1-2 TABS 1-2 HOURS BEFORE HS.       sucralfate (CARAFATE) 1 GM tablet Take 1 tablet (1 g) by mouth 4 times daily 360 tablet 0     tiZANidine (ZANAFLEX) 2 MG tablet 1-2 TABS 1-2 HOURS BEFORE HS.       tofacitinib (XELJANZ) 5 MG tablet 1/d with supper for 2 weeks and then 1 bid with food.         Family History    Problem Relation Age of Onset     Pulmonary Embolism Mother         89     Heart Disease Father      Asthma Father      Asthma Daughter      Myocardial Infarction Brother         age 50     Atrial fibrillation Mother      Heart Disease Brother      Family history is not pertinent to this patients Chief Complaint.     reports that she quit smoking about 37 years ago. She has a 42.00 pack-year smoking history. She has never used smokeless tobacco. She reports previous alcohol use. She reports that she does not use drugs.    Review of Systems -   10 point Review of systems is negative except for; as mentioned above in HPI and PMHx    /66 (BP Location: Right arm, Patient Position: Sitting)   Wt 76.2 kg (168 lb)   BMI 30.73 kg/m    Body mass index is 30.73 kg/m .    EXAM:  GENERAL: Well developed female  HEENT: EOMI, Anicteric Sclera, Moist Mucous Membranes,  In Mouth the pt does not have redness or bleeding gums  CARDIOVASCULAR: RRR w/out murmur   CHEST/LUNG: Clear to Auscultation  ABDOMEN:  Non tender to palpation, +BS  MUSCULOSKELETAL:  No deformities with good range of motion in all extremities  NEURO: She is ambulatory with good strength in both legs.  HEME/LYMPH: No Cervical Adenopathy or tenderness.     IMAGES:  No recent images.  She has an Upper GI from 2018    XR UPPER GI W KUB WO AIR CONTRAST W DIGITAL RECORDING  8/6/2018 9:59 AM     INDICATION: Previous vertical banded gastroplasty. Bleeding more than previously could. Considering revision to mervin y.  COMPARISON: None.     FINDINGS:      ESOPHAGUS: Normal flow of contrast through the esophagus. No mass or stricture. During the time of exam, no gastroesophageal reflux seen.     STOMACH and SMALL BOWEL: Postsurgical changes vertical banded gastroplasty. Gastric pouch present, mildly larger than expected to be seen, with blind-ending elongated inferior extension adjacent to the lesser curvature stomach. Contrast flows freely   through the pouch into  the distal stomach and proximal small bowel. No evidence of stricture. No leak of contrast. Right upper quadrant cholecystectomy clips.    Assessment/Plan:  My biggest concern at this point I am most concerned about this chest pain that sounds very much like Angina.  I want her Heart worked up ASAP.        Once we have some clarity about her heart then we can start working on her hyper-emesis issues.        Wilber Mann MD  [facility]  [phone]      Again, thank you for allowing me to participate in the care of your patient.        Sincerely,        Wilber Mann MD

## 2021-10-02 ENCOUNTER — HEALTH MAINTENANCE LETTER (OUTPATIENT)
Age: 66
End: 2021-10-02

## 2021-10-05 NOTE — PROGRESS NOTES
Assessment & Plan  benign exam today, chest pain is chronic, and atypical, but given age, risk factors and stable abnormal EKG,  will check stress echo.    Will recheck BP at close f/u and discuss starting statin.  Patient currently and historically resistant to taking meds.      Start jardiance for DM.  May have some carioprotective benefit as well.    Problem List Items Addressed This Visit     Essential hypertension with goal blood pressure less than 140/90 (Chronic)    Hypothyroidism, unspecified type (Chronic)    Relevant Orders    TSH with free T4 reflex    Type 2 diabetes mellitus with hyperglycemia, without long-term current use of insulin (H)    Relevant Medications    empagliflozin (JARDIANCE) 25 MG TABS tablet    Other Relevant Orders    Albumin Random Urine Quantitative with Creat Ratio    Lipid panel reflex to direct LDL Non-fasting    Hemoglobin A1c (Completed)      Other Visit Diagnoses     Chest pain, unspecified type    -  Primary    Relevant Orders    EKG 12-lead complete w/read - Clinics (Completed)    Echocardiogram Dobutamine Stress               31 minutes spent on the date of the encounter doing chart review, history and exam, documentation and further activities per the note  {   Return in about 2 weeks (around 10/21/2021) for PCP Follow-up.    TOMEKA Amador  [facility]    Katina Pierson is a 66 year old who presents for the following health issues   HPI       Having chronic  daily episodes of left sided chest pain radiating down left arm- happens randomly at any time, exertional or not.  Lasts up to 15 minutes.    Bariatrics throught this was 2nd to her hx ulcer.      Metformin didn't help in the past.      Hasn't been taking lisinopril     Review of Systems   Cardio chest pain as above      Objective    BP (!) 156/84   Pulse 75   Temp 97.9  F (36.6  C) (Tympanic)   There is no height or weight on file to calculate BMI.  Physical Exam    GENERAL: healthy, alert and no distress  RESP: lungs clear to auscultation - no rales, rhonchi or wheezes  CV: regular rate and rhythm, normal S1 S2, no S3 or S4, no murmur, click or rub, no peripheral edema and peripheral pulses strong  Diabetic foot exam: normal DP   pulses, no trophic changes or ulcerative lesions and normal sensory exam    Results for orders placed or performed in visit on 10/07/21 (from the past 24 hour(s))   Hemoglobin A1c   Result Value Ref Range    Hemoglobin A1C 9.5 (H) 0.0 - 5.6 %   EKG 12-lead complete w/read - Clinics    Impression    Sinus  Rhythm   -Left atrial enlargement.    -Old inferior infarct.   No significant changes from 2019  Virgil Berry PA-C

## 2021-10-05 NOTE — PATIENT INSTRUCTIONS
Call [facility]:  [phone] to schedule stress test        At [facility], we strive to deliver an exceptional experience to you, every time we see you. If you receive a survey, please complete it as we do value your feedback.  If you have MyChart, you can expect to receive results automatically within 24 hours of their completion.  Your provider will send a note interpreting your results as well.   If you do not have MyChart, you should receive your results in about a week by mail.    Your care team:                            Family Medicine Internal Medicine   MD Chay Disla, MD Pam Crane, MD Waylon Robbins, MD Maryjane Barbosa, PAVARGAS Guzman, APRN CNP    Jersey Gentile MD Pediatrics   Virgil Berry, PAVARGAS Edouard, MD Haleigh Ch APRN CNP   MD Soo Graves MD Deborah Mielke, MD Bev Self, APRN [facility]      Clinic hours: Monday - Thursday 7 am-6 pm; Fridays 7 am-5 pm.   Urgent care: Monday - Friday 10 am- 8 pm; Saturday and Sunday 9 am-5 pm.    Clinic: [phone]       [facility]: Monday - Thursday 8 am - 7 pm; Friday 8 am - 6 pm  [facility]: [phone]     Use www.[email or web address].org for 24/7 diagnosis and treatment of dozens of conditions.

## 2021-10-07 ENCOUNTER — OFFICE VISIT (OUTPATIENT)
Dept: FAMILY MEDICINE | Facility: CLINIC | Age: 66
End: 2021-10-07
Payer: COMMERCIAL

## 2021-10-07 VITALS — SYSTOLIC BLOOD PRESSURE: 156 MMHG | HEART RATE: 75 BPM | DIASTOLIC BLOOD PRESSURE: 84 MMHG | TEMPERATURE: 97.9 F

## 2021-10-07 DIAGNOSIS — E11.65 TYPE 2 DIABETES MELLITUS WITH HYPERGLYCEMIA, WITHOUT LONG-TERM CURRENT USE OF INSULIN (H): ICD-10-CM

## 2021-10-07 DIAGNOSIS — I10 ESSENTIAL HYPERTENSION WITH GOAL BLOOD PRESSURE LESS THAN 140/90: ICD-10-CM

## 2021-10-07 DIAGNOSIS — E03.9 HYPOTHYROIDISM, UNSPECIFIED TYPE: ICD-10-CM

## 2021-10-07 DIAGNOSIS — R07.9 CHEST PAIN, UNSPECIFIED TYPE: Primary | ICD-10-CM

## 2021-10-07 LAB — HBA1C MFR BLD: 9.5 % (ref 0–5.6)

## 2021-10-07 PROCEDURE — 80061 LIPID PANEL: CPT | Performed by: PHYSICIAN ASSISTANT

## 2021-10-07 PROCEDURE — 99214 OFFICE O/P EST MOD 30 MIN: CPT | Performed by: PHYSICIAN ASSISTANT

## 2021-10-07 PROCEDURE — 36415 COLL VENOUS BLD VENIPUNCTURE: CPT | Performed by: PHYSICIAN ASSISTANT

## 2021-10-07 PROCEDURE — 93000 ELECTROCARDIOGRAM COMPLETE: CPT | Performed by: PHYSICIAN ASSISTANT

## 2021-10-07 PROCEDURE — 84443 ASSAY THYROID STIM HORMONE: CPT | Performed by: PHYSICIAN ASSISTANT

## 2021-10-07 PROCEDURE — 83036 HEMOGLOBIN GLYCOSYLATED A1C: CPT | Performed by: PHYSICIAN ASSISTANT

## 2021-10-07 ASSESSMENT — PAIN SCALES - GENERAL: PAINLEVEL: MODERATE PAIN (4)

## 2021-10-08 ENCOUNTER — TELEPHONE (OUTPATIENT)
Dept: FAMILY MEDICINE | Facility: CLINIC | Age: 66
End: 2021-10-08

## 2021-10-08 DIAGNOSIS — E11.65 TYPE 2 DIABETES MELLITUS WITH HYPERGLYCEMIA, WITHOUT LONG-TERM CURRENT USE OF INSULIN (H): Primary | ICD-10-CM

## 2021-10-08 LAB
CHOLEST SERPL-MCNC: 235 MG/DL
FASTING STATUS PATIENT QL REPORTED: YES
HDLC SERPL-MCNC: 43 MG/DL
LDLC SERPL CALC-MCNC: 152 MG/DL
NONHDLC SERPL-MCNC: 192 MG/DL
TRIGL SERPL-MCNC: 201 MG/DL
TSH SERPL DL<=0.005 MIU/L-ACNC: 1.63 MU/L (ref 0.4–4)

## 2021-10-08 NOTE — TELEPHONE ENCOUNTER
Pharmacy calling about Jardiance prescribed yesterday for pt.  It is too expensive $ 267.  Pt did not  medication due to cost.  Do you want to send alternate med?  Randi COELLON, RN

## 2021-10-11 RX ORDER — METFORMIN HYDROCHLORIDE 750 MG/1
750 TABLET, EXTENDED RELEASE ORAL
Status: CANCELLED | OUTPATIENT
Start: 2021-10-11

## 2021-10-11 NOTE — TELEPHONE ENCOUNTER
Please request prior authorization for jardiance per Virgil in pharmacy:   Rx ARTURO: [id number]  PCN: [id number]  ID: [id number]  Person code: 01  BCMODE BYRD

## 2021-10-11 NOTE — TELEPHONE ENCOUNTER
Everything else is the same tier or higher per our system.  Please contact pharmacy.  Would some sort of Prior auth help?  She is allergic to sulfonyureas and failed metformin previously so only the more expensive meds are an option.  Insulins also have similar coverage for her.     Virgil Berry PA-C

## 2021-10-13 NOTE — TELEPHONE ENCOUNTER
Central Prior Authorization Team   Phone: [phone]    PA Initiation    Medication: Jardiance 25MG tablets  Insurance Company: [facility] - Phone [phone] Fax [phone]  Pharmacy Filling the Rx: [id number] - [place] - [address] [address]  Filling Pharmacy Phone: [phone]  Filling Pharmacy Fax:    Start Date: 10/13/2021

## 2021-10-13 NOTE — TELEPHONE ENCOUNTER
Per insurance PA is not needed.   Medication is covered at a tier 3 copayment. Medication is not eligible to be moved to a lower tier per medicare guidelines      A brand name medication can not be moved to a generic tier.  Tier 3 is the lowest tier for brand name medications.

## 2021-10-15 ENCOUNTER — TELEPHONE (OUTPATIENT)
Dept: FAMILY MEDICINE | Facility: CLINIC | Age: 66
End: 2021-10-15

## 2021-10-15 DIAGNOSIS — E11.65 TYPE 2 DIABETES MELLITUS WITH HYPERGLYCEMIA, WITHOUT LONG-TERM CURRENT USE OF INSULIN (H): Primary | ICD-10-CM

## 2021-10-15 DIAGNOSIS — E03.9 HYPOTHYROIDISM, UNSPECIFIED TYPE: ICD-10-CM

## 2021-10-15 NOTE — LETTER
October 18, 2021      Kenna Colindres  [facility] [address]        Dear Kenna,     Your provider has sent a 30 day leslie refill of levothyroxine (SYNTHROID/LEVOTHROID) 175 MCG tablet. You are due for an appointment for further refills. Please contact the clinic to schedule an appointment for further refills.        Sincerely,        Perry Kumar MD

## 2021-10-18 RX ORDER — LEVOTHYROXINE SODIUM 175 UG/1
TABLET ORAL
Qty: 90 TABLET | Refills: 0 | Status: SHIPPED | OUTPATIENT
Start: 2021-10-18

## 2021-10-18 NOTE — TELEPHONE ENCOUNTER
Patient returned call.  Informed her of the provider's recommendations.  Patient states that she pays per minute and is spending a lot of minutes on hold.  Offered to schedule patient per provider request.  Patient states that she had multiple appointment with providers and decline to schedule at this time.  She is requesting provider to send medication to her pharmacy.  She states that the Metformin is free but is not effective.

## 2021-10-18 NOTE — TELEPHONE ENCOUNTER
This writer attempted to contact Kenna on 10/18/21      Reason for call Providers message below and left message.      If patient calls back:   Relay message from provider, (read verbatim), document that pt called and close encounter        Angelita Metzger RN

## 2021-10-18 NOTE — TELEPHONE ENCOUNTER
Patient should schedule a follow up with me so we can discuss alternative med choices that are more affordable. Can be a virtual appt.  .  I know it was not very effective previously, but we could also start metofmrin again in the interim and that would at least be something.        Virgil Berry PA-C

## 2021-10-19 RX ORDER — ATORVASTATIN CALCIUM 20 MG/1
20 TABLET, FILM COATED ORAL DAILY
Qty: 90 TABLET | Refills: 3 | Status: SHIPPED | OUTPATIENT
Start: 2021-10-19

## 2021-10-19 RX ORDER — METFORMIN HYDROCHLORIDE 750 MG/1
750 TABLET, EXTENDED RELEASE ORAL
Qty: 30 TABLET | Refills: 0 | Status: SHIPPED | OUTPATIENT
Start: 2021-10-19 | End: 2021-10-25

## 2021-10-19 NOTE — TELEPHONE ENCOUNTER
This writer attempted to contact Kenna on 10/19/21      Reason for call providers message below and left message to give a return phone call to the nurses at [phone]      If patient calls back:   Relay message below, (read verbatim), document that pt called and close encounter        Angelita Metzger RN

## 2021-10-19 NOTE — TELEPHONE ENCOUNTER
Patient notified of provider's message as written below. Patient verbalized good understanding, had no further questions and needed no further support.Catherine Cummings R.N.

## 2021-10-19 NOTE — TELEPHONE ENCOUNTER
Patient does not want to come in to discuss results. Patient knows that her A1C is elevated and she is starting metformin. RN advised patient that her cholesterol was also high. Patient says she has not been on a cholesterol lowering medication in many years and would be fine with starting one again if that is what is recommended. Please leave detailed voicemail because patient does not want to call us and be on hold.  Randi Ogden RN on 10/19/2021 at 4:34 PM

## 2021-10-19 NOTE — TELEPHONE ENCOUNTER
Well then lets just start the metformin and hope for the best. I know its a lot, but  She should try to follow up with me, even virtually (telephone), at some point in the next month.      Virgil Berry PA-C

## 2021-10-20 NOTE — TELEPHONE ENCOUNTER
This writer attempted to contact patient on 10/20/21      Reason for call relay provider message bellow and left message.      If patient calls back:   Registered Nurse called. Direct call to [facility] RN Team.    Sue Baldwin RN

## 2021-10-21 ENCOUNTER — TELEPHONE (OUTPATIENT)
Dept: FAMILY MEDICINE | Facility: CLINIC | Age: 66
End: 2021-10-21

## 2021-10-21 LAB
CREAT UR-MCNC: 50 MG/DL
MICROALBUMIN UR-MCNC: 7 MG/L
MICROALBUMIN/CREAT UR: 14 MG/G CR (ref 0–25)

## 2021-10-21 PROCEDURE — 82043 UR ALBUMIN QUANTITATIVE: CPT | Performed by: PHYSICIAN ASSISTANT

## 2021-10-21 NOTE — TELEPHONE ENCOUNTER
This writer attempted to contact patient on 10/21/2021.     Reason for call was to relay provider message below and left message.     If Patient calls back   Registered nurse called. Direct call to [facility] RN team.     Shell Abrams RN, BSN

## 2021-10-25 ENCOUNTER — ANCILLARY PROCEDURE (OUTPATIENT)
Dept: CARDIOLOGY | Facility: CLINIC | Age: 66
End: 2021-10-25
Attending: PHYSICIAN ASSISTANT
Payer: COMMERCIAL

## 2021-10-25 ENCOUNTER — VIRTUAL VISIT (OUTPATIENT)
Dept: FAMILY MEDICINE | Facility: CLINIC | Age: 66
End: 2021-10-25
Payer: COMMERCIAL

## 2021-10-25 VITALS — BODY MASS INDEX: 29.26 KG/M2 | WEIGHT: 160 LBS

## 2021-10-25 DIAGNOSIS — E11.65 TYPE 2 DIABETES MELLITUS WITH HYPERGLYCEMIA, WITHOUT LONG-TERM CURRENT USE OF INSULIN (H): Primary | ICD-10-CM

## 2021-10-25 DIAGNOSIS — E78.5 DYSLIPIDEMIA: ICD-10-CM

## 2021-10-25 DIAGNOSIS — R07.9 CHEST PAIN, UNSPECIFIED TYPE: ICD-10-CM

## 2021-10-25 PROCEDURE — 93017 CV STRESS TEST TRACING ONLY: CPT | Performed by: INTERNAL MEDICINE

## 2021-10-25 PROCEDURE — 93325 DOPPLER ECHO COLOR FLOW MAPG: CPT | Performed by: INTERNAL MEDICINE

## 2021-10-25 PROCEDURE — 99214 OFFICE O/P EST MOD 30 MIN: CPT | Mod: 95 | Performed by: PHYSICIAN ASSISTANT

## 2021-10-25 PROCEDURE — C8928 TTE W OR W/O FOL W/CON,STRES: HCPCS | Performed by: INTERNAL MEDICINE

## 2021-10-25 PROCEDURE — 93352 ADMIN ECG CONTRAST AGENT: CPT | Performed by: INTERNAL MEDICINE

## 2021-10-25 PROCEDURE — 93018 CV STRESS TEST I&R ONLY: CPT | Performed by: INTERNAL MEDICINE

## 2021-10-25 PROCEDURE — 93321 DOPPLER ECHO F-UP/LMTD STD: CPT | Performed by: INTERNAL MEDICINE

## 2021-10-25 PROCEDURE — 93016 CV STRESS TEST SUPVJ ONLY: CPT | Performed by: INTERNAL MEDICINE

## 2021-10-25 RX ORDER — DOBUTAMINE HYDROCHLORIDE 200 MG/100ML
2.5-2 INJECTION INTRAVENOUS CONTINUOUS
Status: ACTIVE | OUTPATIENT
Start: 2021-10-25

## 2021-10-25 RX ORDER — ATROPINE SULFATE 0.4 MG/ML
0.4 AMPUL (ML) INJECTION ONCE
Status: COMPLETED | OUTPATIENT
Start: 2021-10-25 | End: 2021-10-25

## 2021-10-25 RX ORDER — METFORMIN HYDROCHLORIDE 750 MG/1
750 TABLET, EXTENDED RELEASE ORAL
Qty: 90 TABLET | Refills: 2 | Status: SHIPPED | OUTPATIENT
Start: 2021-10-25

## 2021-10-25 RX ORDER — METOPROLOL TARTRATE 1 MG/ML
5 INJECTION, SOLUTION INTRAVENOUS EVERY 5 MIN PRN
Status: ACTIVE | OUTPATIENT
Start: 2021-10-25

## 2021-10-25 RX ADMIN — Medication 5 ML: at 08:55

## 2021-10-25 RX ADMIN — DOBUTAMINE HYDROCHLORIDE 10 MCG/KG/MIN: 200 INJECTION INTRAVENOUS at 09:18

## 2021-10-25 RX ADMIN — Medication 0.4 MG: at 09:39

## 2021-10-25 RX ADMIN — METOPROLOL TARTRATE 3 MG: 1 INJECTION, SOLUTION INTRAVENOUS at 09:40

## 2021-10-25 NOTE — PROGRESS NOTES
Patient is being evaluated via a billable telephone visit.      What phone number would you like to be contacted at?    [phone]    How would you like to obtain your AVS? Lynnhart     Assessment & Plan  doing well, I did find some other currently covered meds ( see chart post it), though patient tolerating metformin well so will continue that until next recheck  Problem List Items Addressed This Visit     Dyslipidemia    Type 2 diabetes mellitus with hyperglycemia, without long-term current use of insulin (H) - Primary    Relevant Medications    metFORMIN (GLUCOPHAGE-XR) 750 MG 24 hr tablet           19 minutes spent on the date of the encounter doing chart review, history and exam, documentation and further activities per the note           Return in about 2 months (around 12/25/2021) for Lab Work.    TOMEKA Amador  [facility]    Subjective     Patient present to clinic today for the following health issues     HPI   discuss metformin and medication refills .  Started 3 days ago.  Tolerating ok.    She reports she called her insurance and they will ave better coverage on januvia, cangliflozen, liraglutide also tier 1.  diamond be going to [place] soon until may- she will find provider down there for A1C testing .  Family memeber will  refills and mail.            Review of Systems   ENDO DM hx as above      Objective       Vitals:  No vitals were obtained today due to virtual visit.    Physical Exam   healthy, alert and no distress  PSYCH: Alert and oriented times 3; coherent speech, normal   rate and volume, able to articulate logical thoughts, able   to abstract reason, no tangential thoughts, no hallucinations   or delusions  Her affect is normal  RESP: No cough, no audible wheezing, able to talk in full sentences  Remainder of exam unable to be completed due to telephone visits         Phone call duration: 16 minutes

## 2022-01-12 VITALS — WEIGHT: 160 LBS | BODY MASS INDEX: 29.44 KG/M2 | HEIGHT: 62 IN

## 2022-01-18 VITALS — HEIGHT: 62 IN | WEIGHT: 160 LBS | BODY MASS INDEX: 29.44 KG/M2

## 2022-01-18 VITALS — HEIGHT: 62 IN | WEIGHT: 165 LBS | BODY MASS INDEX: 30.36 KG/M2

## 2022-01-18 VITALS — WEIGHT: 165 LBS | HEIGHT: 62 IN | BODY MASS INDEX: 30.36 KG/M2

## 2022-01-18 VITALS — WEIGHT: 165 LBS | BODY MASS INDEX: 30.36 KG/M2 | HEIGHT: 62 IN

## 2022-01-18 VITALS — HEIGHT: 62 IN | BODY MASS INDEX: 30.36 KG/M2 | WEIGHT: 165 LBS

## 2022-01-28 LAB
ALBUMIN (URINE) MG/SPEC: 12.8 MG/L
ALBUMIN/CREATININE RATIO: 18.6 MG/G CREAT
CHOLESTEROL (EXTERNAL): 143 MG/DL (ref 100–199)
CREATININE (URINE): 0.69 G/L
HDLC SERPL-MCNC: 37 MG/DL
LDL CHOLESTEROL (EXTERNAL): 85 MG/DL
NON HDL CHOLESTEROL (EXTERNAL): 106 MG/DL
TRIGLYCERIDES (EXTERNAL): 106 MG/DL
TSH SERPL-ACNC: 0.62 UIU/ML (ref 0.35–4.94)

## 2022-03-21 DIAGNOSIS — I10 ESSENTIAL HYPERTENSION WITH GOAL BLOOD PRESSURE LESS THAN 140/90: ICD-10-CM

## 2022-03-21 NOTE — LETTER
March 29, 2022    Kenna Colindres  [address] [place] [id number]      Dear Kenna Colindres,       Your provider has sent a 1 time leslie refill of lisinopril. You are due for an appointment for further refills. We are currently only able to see select in person visits. We ask that you schedule a telephone visit, video visit or evisit (through PHRQL) with your provider.  Please contact the clinic to schedule an appointment for further refills.     Sincerely,     Your Health Care Team

## 2022-03-23 NOTE — TELEPHONE ENCOUNTER
"Routing refill request to provider for review/approval because:  Blood Pressure out of range    Requested Prescriptions   Pending Prescriptions Disp Refills    lisinopril (ZESTRIL) 10 MG tablet [Pharmacy Med Name: Lisinopril Oral Tablet 10 MG] 90 tablet 0     Sig: TAKE 1 TABLET BY MOUTH ONCE DAILY        ACE Inhibitors (Including Combos) Protocol Failed - 3/21/2022  6:38 PM        Failed - Blood pressure under 140/90 in past 12 months       BP Readings from Last 3 Encounters:   10/07/21 (!) 156/84   09/29/21 118/66   09/16/19 104/70                 Passed - Recent (12 mo) or future (30 days) visit within the authorizing provider's specialty     Patient has had an office visit with the authorizing provider or a provider within the authorizing providers department within the previous 12 mos or has a future within next 30 days. See \"Patient Info\" tab in inbasket, or \"Choose Columns\" in Meds & Orders section of the refill encounter.              Passed - Medication is active on med list        Passed - Patient is age 18 or older        Passed - No active pregnancy on record        Passed - Normal serum creatinine on file in past 12 months     Recent Labs   Lab Test 06/02/21  0818   CR 0.75       Ok to refill medication if creatinine is low          Passed - Normal serum potassium on file in past 12 months     Recent Labs   Lab Test 06/02/21  0818   POTASSIUM 4.0               Passed - No positive pregnancy test within past 12 months              Dottie Levine RN   [facility]   "

## 2022-03-25 DIAGNOSIS — I10 ESSENTIAL HYPERTENSION WITH GOAL BLOOD PRESSURE LESS THAN 140/90: ICD-10-CM

## 2022-03-25 RX ORDER — LISINOPRIL 10 MG/1
TABLET ORAL
Qty: 90 TABLET | Refills: 0 | Status: SHIPPED | OUTPATIENT
Start: 2022-03-25 | End: 2022-07-06

## 2022-03-28 RX ORDER — LISINOPRIL 10 MG/1
TABLET ORAL
Qty: 90 TABLET | Refills: 0 | OUTPATIENT
Start: 2022-03-28

## 2022-04-10 DIAGNOSIS — E11.65 TYPE 2 DIABETES MELLITUS WITH HYPERGLYCEMIA, WITHOUT LONG-TERM CURRENT USE OF INSULIN (H): Primary | ICD-10-CM

## 2022-04-11 NOTE — TELEPHONE ENCOUNTER
Routing refill request to provider for review/approval because:  Drug not active on patient's medication list    Shell Abrams RN, BSN  [facility]

## 2022-04-12 RX ORDER — EMPAGLIFLOZIN 25 MG/1
TABLET, FILM COATED ORAL
Qty: 30 TABLET | Refills: 0 | Status: SHIPPED | OUTPATIENT
Start: 2022-04-12 | End: 2022-05-27

## 2022-05-14 ENCOUNTER — HEALTH MAINTENANCE LETTER (OUTPATIENT)
Age: 67
End: 2022-05-14

## 2022-06-28 LAB
ALT SERPL-CCNC: 22 IU/L (ref 8–45)
AST SERPL-CCNC: 17 IU/L (ref 2–40)

## 2022-07-02 DIAGNOSIS — I10 ESSENTIAL HYPERTENSION WITH GOAL BLOOD PRESSURE LESS THAN 140/90: ICD-10-CM

## 2022-07-06 RX ORDER — LISINOPRIL 10 MG/1
TABLET ORAL
Qty: 30 TABLET | Refills: 0 | Status: SHIPPED | OUTPATIENT
Start: 2022-07-06 | End: 2023-03-31

## 2022-07-06 NOTE — TELEPHONE ENCOUNTER
Routing refill request to provider for review/approval because:  Tarsha given x1 and patient did not follow up, please advise  BP Readings from Last 3 Encounters:   10/07/21 (!) 156/84   09/29/21 118/66   09/16/19 104/70     Creatinine   Date Value Ref Range Status   06/02/2021 0.75 0.60 - 1.10 mg/dL Final   05/09/2019 0.78 0.52 - 1.04 mg/dL Final

## 2022-07-07 NOTE — TELEPHONE ENCOUNTER
Spoke to patient, patient states she didn't order this medication and patient have not being coming to [facility] clinic since a year. Patient goes to a different clinic.    Call pharmacy in [place] and let them know that patient didn't order medication    See [place] MA

## 2022-08-01 DIAGNOSIS — I10 ESSENTIAL HYPERTENSION WITH GOAL BLOOD PRESSURE LESS THAN 140/90: ICD-10-CM

## 2022-08-02 RX ORDER — LISINOPRIL 10 MG/1
TABLET ORAL
Qty: 30 TABLET | Refills: 0 | OUTPATIENT
Start: 2022-08-02

## 2022-08-02 NOTE — TELEPHONE ENCOUNTER
Per refill encounters from 5/25/22 and 7/2/22 patient is being seen at another healthcare system.     Dottie Levine RN   [facility]

## 2022-08-05 LAB — HBA1C MFR BLD: 6.3 %

## 2022-08-09 LAB
CREATININE (EXTERNAL): 0.79 MG/DL (ref 0.57–1.11)
GLUCOSE (EXTERNAL): 147 MG/DL (ref 65–100)
POTASSIUM (EXTERNAL): 4.4 MMOL/L (ref 3.5–5)

## 2022-08-25 DIAGNOSIS — E11.65 TYPE 2 DIABETES MELLITUS WITH HYPERGLYCEMIA, WITHOUT LONG-TERM CURRENT USE OF INSULIN (H): ICD-10-CM

## 2022-08-26 RX ORDER — EMPAGLIFLOZIN 25 MG/1
TABLET, FILM COATED ORAL
Qty: 30 TABLET | Refills: 0 | OUTPATIENT
Start: 2022-08-26

## 2022-08-27 NOTE — TELEPHONE ENCOUNTER
Noted. No further intervention as patient was advised on options.   Nicki Wooten PA-C     
Routing FZ team    Patient extremely upset she received a letter stating needing to make an appointment for further refills on lethyroxine..Refusing to make an appointment.    Will not  leslie refill and will stop taking.    [place]  [place].    Patient can be called or send My Chart message      Provider message to patient 10-12    Hi,   Received your labs from urgent care, please schedule appointment to discuss the results.     Perry Kumar MD      RN received call from patient.    Patient upset regarding letter she received requesting her to make appointment.    Patient stated  she will not make an appointment as she sees no reason for one.  Patient recently had labs drawn.    Feels it is a waste of money and time for an appointment.  Patient is on a limited income.    RN attempted to explain to patient reasons for follow up appointments.    Patient remained upset and continue to refuse making an appointment.    Stiven Kruse, RN, BSN, PHN  [facility]  
Yes

## 2022-08-30 ENCOUNTER — TELEPHONE (OUTPATIENT)
Dept: FAMILY MEDICINE | Facility: CLINIC | Age: 67
End: 2022-08-30

## 2022-08-30 NOTE — TELEPHONE ENCOUNTER
Patient Quality Outreach    Patient is due for the following:   Diabetes -  A1C, Eye Exam and Diabetic Follow-Up Visit    Next Steps:   Schedule a office visit for diabetic follow up.    Type of outreach:    Phone, left message for patient/parent to call back.      Questions for provider review:    None     Crissy Alvarado

## 2022-09-03 ENCOUNTER — HEALTH MAINTENANCE LETTER (OUTPATIENT)
Age: 67
End: 2022-09-03

## 2022-09-16 ENCOUNTER — TRANSFERRED RECORDS (OUTPATIENT)
Dept: MULTI SPECIALTY CLINIC | Facility: CLINIC | Age: 67
End: 2022-09-16

## 2022-10-28 DIAGNOSIS — E11.65 TYPE 2 DIABETES MELLITUS WITH HYPERGLYCEMIA, WITHOUT LONG-TERM CURRENT USE OF INSULIN (H): ICD-10-CM

## 2022-11-01 RX ORDER — ATORVASTATIN CALCIUM 20 MG/1
TABLET, FILM COATED ORAL
Qty: 90 TABLET | Refills: 2 | OUTPATIENT
Start: 2022-11-01

## 2022-12-20 DIAGNOSIS — E11.65 TYPE 2 DIABETES MELLITUS WITH HYPERGLYCEMIA, WITHOUT LONG-TERM CURRENT USE OF INSULIN (H): ICD-10-CM

## 2022-12-20 RX ORDER — METFORMIN HYDROCHLORIDE 750 MG/1
TABLET, EXTENDED RELEASE ORAL
Qty: 90 TABLET | Refills: 1 | OUTPATIENT
Start: 2022-12-20

## 2022-12-20 RX ORDER — ATORVASTATIN CALCIUM 20 MG/1
TABLET, FILM COATED ORAL
Qty: 90 TABLET | Refills: 2 | OUTPATIENT
Start: 2022-12-20

## 2023-01-15 ENCOUNTER — HEALTH MAINTENANCE LETTER (OUTPATIENT)
Age: 68
End: 2023-01-15

## 2023-03-14 ENCOUNTER — TELEPHONE (OUTPATIENT)
Dept: FAMILY MEDICINE | Facility: CLINIC | Age: 68
End: 2023-03-14
Payer: COMMERCIAL

## 2023-03-14 NOTE — TELEPHONE ENCOUNTER
Patient Quality Outreach    Patient is due for the following:   Hypertension -  BP check    Next Steps:   Schedule a nurse only visit for blood pressure check    Type of outreach:    Sent [employer] message.      Questions for provider review:    None     Diane L. Schoenherr, RN

## 2023-03-31 DIAGNOSIS — I10 ESSENTIAL HYPERTENSION WITH GOAL BLOOD PRESSURE LESS THAN 140/90: ICD-10-CM

## 2023-03-31 NOTE — TELEPHONE ENCOUNTER
Pending Prescriptions:                       Disp   Refills    lisinopril (ZESTRIL) 10 MG tablet         30 tab*0            Sig: Take 1 tablet (10 mg) by mouth daily    [employer] plan asking for 100 day supply

## 2023-04-03 RX ORDER — LISINOPRIL 10 MG/1
10 TABLET ORAL DAILY
Qty: 30 TABLET | Refills: 0 | Status: SHIPPED | OUTPATIENT
Start: 2023-04-03

## 2023-04-29 ENCOUNTER — HEALTH MAINTENANCE LETTER (OUTPATIENT)
Age: 68
End: 2023-04-29

## 2023-07-22 ENCOUNTER — HEALTH MAINTENANCE LETTER (OUTPATIENT)
Age: 68
End: 2023-07-22

## 2023-09-30 ENCOUNTER — HEALTH MAINTENANCE LETTER (OUTPATIENT)
Age: 68
End: 2023-09-30

## 2023-12-09 ENCOUNTER — HEALTH MAINTENANCE LETTER (OUTPATIENT)
Age: 68
End: 2023-12-09

## 2024-02-17 ENCOUNTER — HEALTH MAINTENANCE LETTER (OUTPATIENT)
Age: 69
End: 2024-02-17

## 2024-02-22 NOTE — ADDENDUM NOTE
Addendum Note by Polina Branham RN at 6/11/2019  4:07 PM     Author: Polina Branham RN Service: -- Author Type: Registered Nurse    Filed: 6/11/2019  4:07 PM Encounter Date: 6/11/2019 Status: Signed    : Polina Branham RN (Registered Nurse)    Addended by: POLINA BRANHAM on: 6/11/2019 04:07 PM        Modules accepted: Orders         E-rx not working. Please call into [employer] and call or mychart patient when done:    predniSONE 10 mg tablet [[id number]]    Order Details  Dose, Route, Frequency: As Directed   Dispense Quantity: 30 tablet Refills: 0          Sig: Take 4 tablets (40 mg total) by mouth daily for 3 days, THEN 3 tablets (30 mg total) daily for 3 days, THEN 2 tablets (20 mg total) daily for 3 days, THEN 1 tablet (10 mg total) daily for 3 days.

## 2024-07-06 ENCOUNTER — HEALTH MAINTENANCE LETTER (OUTPATIENT)
Age: 69
End: 2024-07-06

## 2024-09-14 ENCOUNTER — HEALTH MAINTENANCE LETTER (OUTPATIENT)
Age: 69
End: 2024-09-14